# Patient Record
Sex: MALE | Race: WHITE | NOT HISPANIC OR LATINO | Employment: FULL TIME | URBAN - METROPOLITAN AREA
[De-identification: names, ages, dates, MRNs, and addresses within clinical notes are randomized per-mention and may not be internally consistent; named-entity substitution may affect disease eponyms.]

---

## 2017-01-06 ENCOUNTER — ONCOLOGY VISIT (OUTPATIENT)
Dept: ONCOLOGY | Facility: CLINIC | Age: 47
End: 2017-01-06
Attending: PHYSICIAN ASSISTANT
Payer: COMMERCIAL

## 2017-01-06 VITALS
OXYGEN SATURATION: 96 % | BODY MASS INDEX: 30.04 KG/M2 | RESPIRATION RATE: 16 BRPM | WEIGHT: 234.1 LBS | SYSTOLIC BLOOD PRESSURE: 136 MMHG | HEIGHT: 74 IN | DIASTOLIC BLOOD PRESSURE: 82 MMHG | HEART RATE: 95 BPM | TEMPERATURE: 98.4 F

## 2017-01-06 DIAGNOSIS — D47.02 MAST CELL DISEASE, SYSTEMIC: Primary | ICD-10-CM

## 2017-01-06 PROCEDURE — 99213 OFFICE O/P EST LOW 20 MIN: CPT | Mod: ZP | Performed by: PHYSICIAN ASSISTANT

## 2017-01-06 PROCEDURE — 99212 OFFICE O/P EST SF 10 MIN: CPT | Mod: ZF

## 2017-01-06 ASSESSMENT — PAIN SCALES - GENERAL: PAINLEVEL: NO PAIN (0)

## 2017-01-06 NOTE — PROGRESS NOTES
"[facility] CANCER CLINIC  FOLLOW-UP VISIT NOTE  Date of visit: 1-5-17          REASON FOR VISIT: MCAS follow up    HPI: Ricky is a 45 year old male who has been diagnosed by Dr Horta with MCAS due to his burning scalp syndrome and presence of mast cells in his scalp biopsy.  Please see his initial consult note on 4/15/15.  The following was cut and past from his last progress note defining the diagnosis:     \"Current Diagnosis: Mast cell activation syndrome (MCAS) would seem to be the diagnosis here as he meets all current diagnostic criteria for this (per Floyd et al., J Hematol Oncol 2011) and the increased mast cells found in his scalp biopsy do not meet criteria for cutaneous mastocytosis. See my 4/15/15 note for the full history of how his illness has developed.\"    Ricky's current therapy includes the following:   -oral Xyzal 5 mg QID  -oral Allegra 45 mg QID (breaks 1 tab into 4)  -2 tabs of oral ketotifen 4-5 x daily  -plaquenil once daily  -oral cromolyn QID (usually 200 mg in AM and then 100 mg subsequently)  -monthly steroid injections into his scalp  -ketotifen eye drops 2-3 times a day  -cayenne pepper caps  -theron ground in food    INTERVAL HISTORY: Ricky's last changes to his regimen have been the oral cayenne tabs and ground theron. With this addition and adjustments in the H1 blockers he reports his pain in his scalp is a 2-3/10.  He tells me that when he was initially diagnosed, he was having severe 7-8/10 scalp pain, so he is very grateful for this substantial improvement.  He even has a some notable hair re-growth that he shows me today.  He seems to be tolerating all his meds with no adverse effects.  He runs daily at the gym and works full time. He feels his energy and focus is better.    No other cutaneous symptoms (flushing/hives/itching), no GI or neuro symptoms.       EXAM:  /82 mmHg  Pulse 95  Temp(Src) 98.4  F (36.9  C) (Oral)  Resp 16  Ht 1.87 m " "(6' 1.62\")  Wt 106.187 kg (234 lb 1.6 oz)  BMI 30.37 kg/m2  SpO2 96%  Wt Readings from Last 4 Encounters:   01/06/17 106.187 kg (234 lb 1.6 oz)   08/24/16 104.327 kg (230 lb)   06/06/16 101.243 kg (223 lb 3.2 oz)   04/25/16 100.517 kg (221 lb 9.6 oz)     46 year old male appears younger than his stated age.  Has a full head of hair, with no discernable redness/rash.   Very talkative. No other hives/erythema/flushing on skin.    ASSESSMENT/PLAN: 46 year old man with history of burning scalp syndrome which biopsy showed increased amount of mast cells.  He has no other systemic symptoms or body systems that seem to have components of MCAS except his scalp.  Overall he has had a huge improvement in his symptoms from 8/10 -->2/10 but he really wants to get it to zero.    Ricky currently has had tremendous relief with spacing out his H1 blockers.  He thinks the addition of oral cromolyn and ketotifen has helped tremendously.  He has felt like high dose Vit C made it worse and that singulair didn't help (he has tried ALA, NAC, quercetin, vit c, vit d). The celebrex didn't help. He no longer takes Tylenol either, although I am not sure I got an answer why. He didn't care for any of the topical options I gave him at our last visit and the Zileuton didn't help either.  I feel he has pretty good control and he seems very happy right now. I would like to avoid further scripts with him and encouraged continuing with more natural oral anti inflammatory supplements (curcumin, tumeric, and a re-trial of ALA) since the theron and cayenne are working so well.           Jacquie Almanza PA-C    "

## 2017-01-06 NOTE — NURSING NOTE
"Ricky Epstein is a 46 year old male who presents for:  Chief Complaint   Patient presents with     Oncology Clinic Visit     Dermatitis, Dysethesia        Initial Vitals:  /82 mmHg  Pulse 95  Temp(Src) 98.4  F (36.9  C) (Oral)  Resp 16  Ht 1.87 m (6' 1.62\")  Wt 106.187 kg (234 lb 1.6 oz)  BMI 30.37 kg/m2  SpO2 96% Estimated body mass index is 30.37 kg/(m^2) as calculated from the following:    Height as of this encounter: 1.87 m (6' 1.62\").    Weight as of this encounter: 106.187 kg (234 lb 1.6 oz).. Body surface area is 2.35 meters squared. BP completed using cuff size: regular  No Pain (0) No LMP for male patient. Allergies and medications reviewed.     Medications: Medication refills not needed today.  Pharmacy name entered into EPIC:    [facility]  [facility] [address] [place] [address] [id number] [facility]    Comments:     7 minutes for nursing intake (face to face time)   Miriam Garcia LPN          "

## 2017-01-06 NOTE — MR AVS SNAPSHOT
After Visit Summary   1/6/2017    Ricky Epstein    MRN: [id number]           Patient Information     Date Of Birth          1970        Visit Information        Provider Department      1/6/2017 1:20 PM Liz Almanza PA-C [facility]         Follow-ups after your visit        Your next 10 appointments already scheduled     Apr 14, 2017 11:15 AM   (Arrive by 11:00 AM)   RETURN HAIRLOSS with Alicia Gan MD   [facility])    [address] [place] [address]  3rd [facility] [id number]   [phone]            Apr 14, 2017  1:30 PM   (Arrive by 1:15 PM)   Return Visit with Juma Horta MD   [facility] ([facility])    [address] [place] [id number] [phone]            Jul 24, 2017  4:50 PM   (Arrive by 4:35 PM)   Return Visit with Liz Almanza PA-C   [facility] ([facility])    [address] [place] [id number] [phone]              Who to contact     If you have questions or need follow up information about today's clinic visit or your schedule please contact [facility] directly at [phone].  Normal or non-critical lab and imaging results will be communicated to you by MyChart, letter or phone within 4 business days after the clinic has received the results. If you do not hear from us within 7 days, please contact the clinic through MyChart or phone. If you have a critical or abnormal lab result, we will notify you by phone as soon as possible.  Submit refill requests through [employer] or call your pharmacy and they will forward the refill request to us. Please allow 3 business days for your refill to be completed.          Additional Information About Your Visit        DJZhart Information     [employer] gives you secure access to your  "electronic health record. If you see a primary care provider, you can also send messages to your care team and make appointments. If you have questions, please call your primary care clinic.  If you do not have a primary care provider, please call [phone] and they will assist you.        Care EveryWhere ID     This is your Care EveryWhere ID. This could be used by other organizations to access your [place] medical records  [id number]        Your Vitals Were     Pulse Temperature Respirations Height BMI (Body Mass Index) Pulse Oximetry    95 98.4  F (36.9  C) (Oral) 16 1.87 m (6' 1.62\") 30.37 kg/m2 96%       Blood Pressure from Last 3 Encounters:   01/06/17 136/82   11/07/16 128/88   08/24/16 133/85    Weight from Last 3 Encounters:   01/06/17 106.187 kg (234 lb 1.6 oz)   08/24/16 104.327 kg (230 lb)   06/06/16 101.243 kg (223 lb 3.2 oz)              Today, you had the following     No orders found for display         Today's Medication Changes          These changes are accurate as of: 1/6/17  2:06 PM.  If you have any questions, ask your nurse or doctor.               These medicines have changed or have updated prescriptions.        Dose/Directions    * UNABLE TO FIND   This may have changed:  Another medication with the same name was removed. Continue taking this medication, and follow the directions you see here.   Changed by:  Juma Horta MD        Dose:  1 teaspoonful   1 teaspoonful daily MEDICATION NAME: Diatomaceous Earth   Refills:  0       * UNABLE TO FIND   This may have changed:  Another medication with the same name was removed. Continue taking this medication, and follow the directions you see here.   Changed by:  Liz Almanza PA-C        Dose:  1 capsule   1 capsule 8 times daily Cyan Pepper   Refills:  0       * UNABLE TO FIND   This may have changed:  Another medication with the same name was removed. Continue taking this medication, and follow the directions you see here. "   Changed by:  Liz Almanza PA-C        Dose:  1 teaspoonful   1 teaspoonful 6 times daily Organic Ground Shawanda   Refills:  0       * Notice:  This list has 3 medication(s) that are the same as other medications prescribed for you. Read the directions carefully, and ask your doctor or other care provider to review them with you.      Stop taking these medicines if you haven't already. Please contact your care team if you have questions.     ALOE CAPE Misc   Stopped by:  Liz Almanza PA-C           clobetasol propionate 0.05 % Sham   Stopped by:  Liz Almanza PA-C           cromolyn 100 MG/5ML (HIGH CONC) solution   Commonly known as:  GASTROCROM   Stopped by:  Liz Almanza PA-C           cromolyn sodium 5.2 MG/ACT Aers Inhaler   Stopped by:  Liz Almanza PA-C           econazole nitrate 1 % cream   Stopped by:  Liz Almanza PA-C           Efinaconazole 10 % Soln   Stopped by:  Liz Almanza PA-C           ketoconazole 2 % shampoo   Commonly known as:  NIZORAL   Stopped by:  Liz Almanza PA-C           NAPROXEN SODIUM PO   Stopped by:  Liz Almanza PA-C           nystatin [id number] UNIT/ML suspension   Commonly known as:  MYCOSTATIN   Stopped by:  Liz Almanza PA-C           promethazine 25 MG tablet   Commonly known as:  PHENERGAN   Stopped by:  Liz Almanza PA-C           QUERCETIN PO   Stopped by:  Liz Almanza PA-C           SODIUM BICARBONATE PO   Stopped by:  Liz Almanza PA-C           terbinafine 250 MG tablet   Commonly known as:  lamISIL   Stopped by:  Liz Almanza PA-C           ZANTAC PO   Stopped by:  Liz Almanza PA-C           zileuton 600 MG Tabs tablet   Commonly known as:  ZYFLO   Stopped by:  Liz Almanza PA-C                    Primary Care Provider Office Phone # Fax #    Chad Penaloza [phone]       [address] [address]         Thank you!     Thank you for choosing [facility] CANCER CLINIC  for your care. Our goal is always to provide you with excellent care. Hearing back from our patients is one way we can continue to improve our services. Please take a few minutes to complete the written survey that you may receive in the mail after your visit with us. Thank you!             Your Updated Medication List - Protect others around you: Learn how to safely use, store and throw away your medicines at www.disposemymeds.org.          This list is accurate as of: 1/6/17  2:06 PM.  Always use your most recent med list.                   Brand Name Dispense Instructions for use    cromolyn 4 % ophthalmic solution    OPTICROM    3 Bottle    Place 1 drop into both eyes 8 times daily       DHS ZINC 2 % Sham   Generic drug:  Pyrithione Zinc      Externally apply topically daily       fexofenadine 60 MG tablet    ALLEGRA    120 tablet    Take 3 tabs in am (180 mg) and 60 mg in mid afternoon       finasteride 5 MG tablet    PROSCAR    30 tablet    Take 1 tablet (5 mg) by mouth daily       hydroxychloroquine 200 MG tablet    PLAQUENIL    30 tablet    Take 1 tablet (200 mg) by mouth daily       iron 325 (65 FE) MG tablet      Take 1 tablet by mouth daily       levocetirizine 5 MG tablet    XYZAL    120 tablet    Take 1 tablet (5 mg) by mouth 2 times daily May increase to 10 mg bid if feeling that 5 mg bid is helping better than other H1 blockers.       NEW MED     240 capsule    Compounded oral ketotifen 1 mg caps, start 1 mg bid, escalate weekly as tolerated to maximum 4 mg bid-tid       * UNABLE TO FIND      1 teaspoonful daily MEDICATION NAME: Diatomaceous Earth       * UNABLE TO FIND      1 capsule 8 times daily Cyan Pepper       * UNABLE TO FIND      1 teaspoonful 6 times daily Organic Ground Shawanda       vitamin  B-12 250 MCG Tabs      Take by mouth daily       * Notice:  This list has 3 medication(s) that are the same as other medications  prescribed for you. Read the directions carefully, and ask your doctor or other care provider to review them with you.

## 2017-01-06 NOTE — Clinical Note
1/6/2017       RE: Ricky Epstein  [address] [place] [address]     Dear Colleague,    Thank you for referring your patient, Ricky Epstein, to the [facility]. Please see a copy of my visit note below.    No notes on file    Again, thank you for allowing me to participate in the care of your patient.      Sincerely,    Liz Almanza PA-C

## 2017-01-06 NOTE — Clinical Note
"1/6/2017      RE: Ricky Epstein  [address] [place] [address]       [facility] CANCER CLINIC  FOLLOW-UP VISIT NOTE  Date of visit: 1-5-17          REASON FOR VISIT: MCAS follow up    HPI: Ricky is a 45 year old male who has been diagnosed by Dr Horta with MCAS due to his burning scalp syndrome and presence of mast cells in his scalp biopsy.  Please see his initial consult note on 4/15/15.  The following was cut and past from his last progress note defining the diagnosis:     \"Current Diagnosis: Mast cell activation syndrome (MCAS) would seem to be the diagnosis here as he meets all current diagnostic criteria for this (per Floyd et al., J Hematol Oncol 2011) and the increased mast cells found in his scalp biopsy do not meet criteria for cutaneous mastocytosis. See my 4/15/15 note for the full history of how his illness has developed.\"    Ricky's current therapy includes the following:   -oral Xyzal 5 mg QID  -oral Allegra 45 mg QID (breaks 1 tab into 4)  -2 tabs of oral ketotifen 4-5 x daily  -plaquenil once daily  -oral cromolyn QID (usually 200 mg in AM and then 100 mg subsequently)  -monthly steroid injections into his scalp  -ketotifen eye drops 2-3 times a day  -cayenne pepper caps  -theron ground in food    INTERVAL HISTORY: Ricky's last changes to his regimen have been the oral cayenne tabs and ground theron. With this addition and adjustments in the H1 blockers he reports his pain in his scalp is a 2-3/10.  He tells me that when he was initially diagnosed, he was having severe 7-8/10 scalp pain, so he is very grateful for this substantial improvement.  He even has a some notable hair re-growth that he shows me today.  He seems to be tolerating all his meds with no adverse effects.  He runs daily at the gym and works full time. He feels his energy and focus is better.    No other cutaneous symptoms (flushing/hives/itching), no GI or neuro symptoms.       EXAM:  /82 " "mmHg  Pulse 95  Temp(Src) 98.4  F (36.9  C) (Oral)  Resp 16  Ht 1.87 m (6' 1.62\")  Wt 106.187 kg (234 lb 1.6 oz)  BMI 30.37 kg/m2  SpO2 96%  Wt Readings from Last 4 Encounters:   01/06/17 106.187 kg (234 lb 1.6 oz)   08/24/16 104.327 kg (230 lb)   06/06/16 101.243 kg (223 lb 3.2 oz)   04/25/16 100.517 kg (221 lb 9.6 oz)     46 year old male appears younger than his stated age.  Has a full head of hair, with no discernable redness/rash.   Very talkative. No other hives/erythema/flushing on skin.    ASSESSMENT/PLAN: 46 year old man with history of burning scalp syndrome which biopsy showed increased amount of mast cells.  He has no other systemic symptoms or body systems that seem to have components of MCAS except his scalp.  Overall he has had a huge improvement in his symptoms from 8/10 -->2/10 but he really wants to get it to zero.    Ricky currently has had tremendous relief with spacing out his H1 blockers.  He thinks the addition of oral cromolyn and ketotifen has helped tremendously.  He has felt like high dose Vit C made it worse and that singulair didn't help (he has tried ALA, NAC, quercetin, vit c, vit d). The celebrex didn't help. He no longer takes Tylenol either, although I am not sure I got an answer why. He didn't care for any of the topical options I gave him at our last visit and the Zileuton didn't help either.  I feel he has pretty good control and he seems very happy right now. I would like to avoid further scripts with him and encouraged continuing with more natural oral anti inflammatory supplements (curcumin, tumeric, and a re-trial of ALA) since the theron and cayenne are working so well.           Jacquie Almanza PA-C          "

## 2017-02-13 DIAGNOSIS — D47.02 MAST CELL DISEASE, SYSTEMIC: Chronic | ICD-10-CM

## 2017-02-13 RX ORDER — CROMOLYN SODIUM 40 MG/ML
1 SOLUTION/ DROPS OPHTHALMIC
Qty: 3 BOTTLE | Refills: 11 | Status: SHIPPED | OUTPATIENT
Start: 2017-02-13 | End: 2018-10-15

## 2017-03-30 DIAGNOSIS — D47.02 MAST CELL DISEASE, SYSTEMIC: ICD-10-CM

## 2017-03-30 NOTE — TELEPHONE ENCOUNTER
Medication Requested and Quantity:  Compounded KETOTIFEN 1mg  Last date written and prescriber:  3/2016  Last refill per fax:  7/15/16 for #[phone]  Last office visit: 1/6/17    Next office visit:  4/14/17  NOTES: Spoke with patient whom states he takes up to 10-12 mg per day.  He generally takes only 1-2mg at a time.  Currently paying $199 for 420 caps  Processing:  Fax Rx to [facility]  [phone]  APPROVED

## 2017-04-14 ENCOUNTER — ONCOLOGY VISIT (OUTPATIENT)
Dept: ONCOLOGY | Facility: CLINIC | Age: 47
End: 2017-04-14
Attending: INTERNAL MEDICINE
Payer: COMMERCIAL

## 2017-04-14 VITALS
WEIGHT: 242.3 LBS | HEIGHT: 74 IN | OXYGEN SATURATION: 97 % | BODY MASS INDEX: 31.1 KG/M2 | TEMPERATURE: 98.3 F | DIASTOLIC BLOOD PRESSURE: 94 MMHG | RESPIRATION RATE: 18 BRPM | SYSTOLIC BLOOD PRESSURE: 159 MMHG | HEART RATE: 89 BPM

## 2017-04-14 DIAGNOSIS — D89.42 IDIOPATHIC MAST CELL ACTIVATION SYNDROME (H): Primary | Chronic | ICD-10-CM

## 2017-04-14 PROCEDURE — 99214 OFFICE O/P EST MOD 30 MIN: CPT | Mod: ZP | Performed by: INTERNAL MEDICINE

## 2017-04-14 PROCEDURE — 99212 OFFICE O/P EST SF 10 MIN: CPT | Mod: ZF

## 2017-04-14 ASSESSMENT — PAIN SCALES - GENERAL: PAINLEVEL: NO PAIN (0)

## 2017-04-14 NOTE — MR AVS SNAPSHOT
After Visit Summary   4/14/2017    Ricky Epstein    MRN: [id number]           Patient Information     Date Of Birth          1970        Visit Information        Provider Department      4/14/2017 9:00 AM Juma Horta MD [facility]        Today's Diagnoses     Idiopathic mast cell activation syndrome    -  1       Follow-ups after your visit        Follow-up notes from your care team     Return in about 6 months (around 10/14/2017).      Your next 10 appointments already scheduled     May 05, 2017 10:00 AM CDT   (Arrive by 9:45 AM)   RETURN HAIRLOSS with Alicia Gan MD   [facility] ([facility])    [address] [place] [address]  3rd Floor  [facility] [id number] [phone]            Jul 24, 2017  4:50 PM CDT   (Arrive by 4:35 PM)   Return Visit with Liz Almanza PA-C   [facility] ([facility])    [address] [place] [address]  2nd [facility] [id number] [phone]            Nov 07, 2017  4:30 PM CST   (Arrive by 4:15 PM)   Return Visit with Juma Horta MD   [facility] ([facility])    [address] [place] [address]  2nd [facility] [id number] [phone]              Who to contact     If you have questions or need follow up information about today's clinic visit or your schedule please contact [facility] directly at [phone].  Normal or non-critical lab and imaging results will be communicated to you by MyChart, letter or phone within 4 business days after the clinic has received the results. If you do not hear from us within 7 days, please contact the clinic through Intelli[employer] or phone. If you have a critical or abnormal lab result, we will notify you by phone as soon as possible.  Submit refill requests through [employer]oah or call your pharmacy and they will forward the refill request to us. Please  "allow 3 business days for your refill to be completed.          Additional Information About Your Visit        JoyTuneshart Information     [employer] gives you secure access to your electronic health record. If you see a primary care provider, you can also send messages to your care team and make appointments. If you have questions, please call your primary care clinic.  If you do not have a primary care provider, please call [phone] and they will assist you.        Care EveryWhere ID     This is your Care EveryWhere ID. This could be used by other organizations to access your [place] medical records  [id number]        Your Vitals Were     Pulse Temperature Respirations Height Pulse Oximetry BMI (Body Mass Index)    89 98.3  F (36.8  C) (Oral) 18 1.87 m (6' 1.62\") 97% 31.43 kg/m2       Blood Pressure from Last 3 Encounters:   04/14/17 (!) 159/94   01/06/17 136/82   11/07/16 128/88    Weight from Last 3 Encounters:   04/14/17 109.9 kg (242 lb 4.8 oz)   01/06/17 106.2 kg (234 lb 1.6 oz)   08/24/16 104.3 kg (230 lb)              Today, you had the following     No orders found for display         Today's Medication Changes          These changes are accurate as of: 4/14/17 11:53 PM.  If you have any questions, ask your nurse or doctor.               These medicines have changed or have updated prescriptions.        Dose/Directions    * UNABLE TO FIND   This may have changed:  Another medication with the same name was changed. Make sure you understand how and when to take each.   Changed by:  Juma Horta MD        Dose:  1 teaspoonful   1 teaspoonful daily MEDICATION NAME: Diatomaceous Earth   Refills:  0       * UNABLE TO FIND   This may have changed:  Another medication with the same name was changed. Make sure you understand how and when to take each.   Changed by:  Liz Almanza PA-C        Dose:  1 teaspoonful   1 teaspoonful 6 times daily Organic Ground Shawanda   Refills:  0       * UNABLE TO FIND "   This may have changed:    - how to take this  - additional instructions   Changed by:  Juma Horta MD        Dose:  1 capsule   Take 1 capsule by mouth 8 times daily Cayenne Pepper   Refills:  0       * Notice:  This list has 3 medication(s) that are the same as other medications prescribed for you. Read the directions carefully, and ask your doctor or other care provider to review them with you.             Primary Care Provider Office Phone # Fax #    Chad Penaloza [phone]       [facility] [address] [facility] [address]        Thank you!     Thank you for choosing [facility]  for your care. Our goal is always to provide you with excellent care. Hearing back from our patients is one way we can continue to improve our services. Please take a few minutes to complete the written survey that you may receive in the mail after your visit with us. Thank you!             Your Updated Medication List - Protect others around you: Learn how to safely use, store and throw away your medicines at www.disposemymeds.org.          This list is accurate as of: 4/14/17 11:53 PM.  Always use your most recent med list.                   Brand Name Dispense Instructions for use    COMPOUND - PHARMACY TO MIX COMPOUNDED MEDICATION    CMPD RX    420 capsule    KETOTIFEN Compounded 1mg capsules:  Take 1-2mg as needed orally.  MAX dosing 12mg/day       cromolyn 4 % ophthalmic solution    OPTICROM    3 Bottle    Place 1 drop into both eyes 8 times daily       DHS ZINC 2 % Sham   Generic drug:  Pyrithione Zinc      Externally apply topically daily       fexofenadine 60 MG tablet    ALLEGRA    120 tablet    Take 3 tabs in am (180 mg) and 60 mg in mid afternoon       finasteride 5 MG tablet    PROSCAR    30 tablet    Take 1 tablet (5 mg) by mouth daily       hydroxychloroquine 200 MG tablet    PLAQUENIL    30 tablet    Take 1 tablet (200 mg) by mouth daily       iron 325  (65 FE) MG tablet      Take 1 tablet by mouth daily       levocetirizine 5 MG tablet    XYZAL    120 tablet    Take 1 tablet (5 mg) by mouth 2 times daily May increase to 10 mg bid if feeling that 5 mg bid is helping better than other H1 blockers.       * UNABLE TO FIND      1 teaspoonful daily MEDICATION NAME: Diatomaceous Earth       * UNABLE TO FIND      1 teaspoonful 6 times daily Organic Ground Shawanda       * UNABLE TO FIND      Take 1 capsule by mouth 8 times daily Cayenne Pepper       vitamin  B-12 250 MCG Tabs      Take by mouth daily       * Notice:  This list has 3 medication(s) that are the same as other medications prescribed for you. Read the directions carefully, and ask your doctor or other care provider to review them with you.

## 2017-04-14 NOTE — PROGRESS NOTES
"Patient: Ricky Epstein   (MRN [id number])   Encounter Date: Apr 14, 2017  Referring: Alicia Gan M.D. ([facility] Department of Dermatology), Chad Penaloza M.D. ([facility], [address] [place] [address], [phone], fax [phone])  Attending: Juma Horta M.D.     Current Diagnosis: Mast cell activation syndrome (MCAS) would seem to be the diagnosis here as he meets all current diagnostic criteria for this (per Floyd et al., J Hematol Oncol 2011) and the increased mast cells found in his scalp biopsy do not meet criteria for cutaneous mastocytosis.  See my 4/15/15 note for the full history of how his illness has developed.    Current Therapy: He's on a rather complex regimen, taking ophthalmic cromolyn (1 drop to each eye), compounded oral ketotifen 1 mg, and ketotifen eyedrops in rotation such that he's taking each of these four drugs about eight times daily.  Additionally, he takes levocetirizine 10 mg bid, Plaquenil 200 mg qd, fexofenadine 180 mg qam and 60 mg q mid-afternoon, finasteride 5 mg qd, B12 daily, iron sulfate 325 mg qd, cayenne pepper 40,000 unit (?) capsules 1-2 capsules q3h while awake, theron in some quantity several times daily, diatomaceous earth 1 tsp qd, B12 250 mcg po qd, and pyrithione zinc 2% shampoo qd.  He lists penicillin (rash) as his only known drug allergy, but he also reports in 4/17 having learned that ingestion of silica in any form is a trigger, promptly setting his scalp \"on fire.\"    Therapeutic History: We recall nasal cromolyn initially helped his sinonasal symptoms, but then efficacy was lost and he stopped it.  Cromolyn cream, Benadryl cream, Benadryl liquid added to his shampoo, capsaicin cream, and topical aloe vera applied to the scalp did not help the scalp pain and he stopped these trials.  He uses H1 blockers, but he has found no H2 blocker to be helpful.  He found a trial of zileuton intolerable.  Celecoxib was " "unhelpful and was stopped.  He no longer uses H2 blockers of any sort since he has never found any of them helpful.  He has not found oral cromolyn helpful, but ophthalmic cromolyn and ketotifen (and oral ketotifen) have been helpful, including the intriguing observation that ophthalmic cromolyn (applied ophthalmically) has been one of his most useful drugs for controlling his chief presenting complaint of \"screaming\" 7-8/10 scalp pain.    Interval History: This 47 year old single white male  and  returns in scheduled follow-up reporting \"everything's pretty good\" on his oral H1 blockers and compounded ketotifen, ophthalmic cromolyn and ketotifen (intriguingly, he feels ophthalmic cromolyn, applied ophthalmically, is one of his best drugs for controlling his scalp pain), cayenne pepper, theron, hydroxychloroquine, and pyrithione zinc shampoo.  Oral ketotifen is easier for him to tolerate when taken with milk rather than water.  Celecoxib was unhelpful and was stopped.  He remains concerned about progressing weight gain in spite of a good diet and, as always, an aggressive exercise regimen.  The weight gain and his ongoing residual scalp pain, a \"tolerable\" 2-3/10 each day compared to the \"screaming\" 7-8/10 with which he initially presented to me, are really his only complaints.  He remains interested in trying additional interventions in an effort to reduce his residual 2/10 pain to 0/10 (and hopefully to address his weight gain, too).  No other fundamentally new problems.  Complete ROS o/w neg.    Exam finds an unchanged, outwardly completely healthy appearing, middle-aged man (again with what appears to me to be a robust head of hair) in no acute distress, pleasant, cooperative, fully alert and oriented, easily independently ambulatory, presenting by himrself.  He is dressed to attend the  later today of an uncle who recently unexpectedly passed at 71.  Vitals per chart, notable " for BP worsened a bit at 159/94, pulse down from 99 to 89 (obviously still borderline tachycardic in this  at rest), and weight is up significantly yet again (another 12 pounds since 8/16) to another new high of 242 pounds.  Key findings are his stable (but for rising weight), healthy/comfortable general appearance, HEENT benign, still a fairly thick head of hair (I see in 1/17 he observed to AUTUMN Michael that it's actually been growing thicker as his scalp pain has come under better control), still no other plethora/pallor/diaphoresis/jaundice/rash/bleeding/bruising, neck supple, no JVD or thyromegaly or carotid bruits, no palpable adenopathy or tenderness at any of the usual node-bearing sites, clear lungs, regular heart with no adventitious sounds, abdomen benign (except for, again, no longer as scaphoid as I originally saw), a few unchanged (i.e., stable for many years) light brown nevi scattered about the trunk, no peripheral edema, neuro grossly intact.  Previously (at the initial visit here in 4/15), on a light scratch test on the upper back, moderately bright dermatographism (erythroderma only, no hives) immediately emerged and was fully sustained when last checked 10 minutes later, and I saw this again when I re-checked it in 11/15, but we did not re-check it today.    No new labs today.  Last labs in 11/16 showed a 100% normal CBCD improved from a very minimal/borderline anemia in 4/16, possibly due to iron supplementation or possibly due to other interventions better controlling his MCAS), CMP, and iron studies.    A/P: Underlying/unifying diagnosis (MCAS) as detailed above and previously, continues to have a nice response to the previously initiated mast-cell-targeted medications as detailed above.  Although oral cromolyn and a cromolyn cream to the scalp (obviously a messy affair in which one has to wonder how completely the medication could be topically applied to a head full of  "hair) were previously of no help, I am intrigued that ophthalmic cromolyn is one of his best drugs for controlling his \"burning scalp syndrome.\"  As such, I asked him to re-try topical cromolyn again, this time as a cromolyn shampoo, mixing (initially just a partial, later a full) bottle of Nasalcrom or Opticrom to his shampoo, trying to keep the shampoo in place for at least 5-10 minutes, at least once per day (though preferably twice a day).  This obviously will be a relatively low-intensity exposure, and it's not at all clear to me whether it will help, but it certainly ought to be safe and inexpensive.  Although it's likely that his MCAS is driving his weight gain, it's hard to say whether it's the dysfunctional mast cells in his scalp and/or dysfunctional mast cells in some other field which are primarily driving this, and I observed to him that if the scalp mast cells are the primary drivers of both the scalp pain and the weight gain, it likely will take considerably longer to see effects of the topical cromolyn on weight gain than on scalp pain, and it's even possible we might see effects on weight gain without necessarily seeing effects on scalp pain.  As such, this will need to be a relatively long term trial of the cromolyn shampoo, lasting at least 3 months and potentially even 6+ months.    He'll return to see Ms. Almanza in about 3 months and then return to see me in about 6-8 months from now.      Typed, reviewed, and electronically signed by: Juma Horta M.D.     DT: 04/14/2017 11:50 PM    cc: 1. Alicia Gan M.D. ([facility] Department of Dermatology)  2. Chad Penaloza M.D. ([facility], [facility], [place] [address], [phone], fax [phone])  "

## 2017-04-14 NOTE — LETTER
"4/14/2017       RE: Ricky Epstein  [address] [place] [address] [facility] [address]     Dear Colleague,    Thank you for referring your patient, Ricky Epstein, to the [facility]. Please see a copy of my visit note below.    Patient: Ricky Epstein   (MRN [id number])   Encounter Date: Apr 14, 2017  Referring: Alicia Gan M.D. ([facility] Department of Dermatology), Chad Penaloza M.D. ([address] [address], [phone], fax [phone])  Attending: Juma Horta M.D.     Current Diagnosis: Mast cell activation syndrome (MCAS) would seem to be the diagnosis here as he meets all current diagnostic criteria for this (per Floyd et al., J Hematol Oncol 2011) and the increased mast cells found in his scalp biopsy do not meet criteria for cutaneous mastocytosis.  See my 4/15/15 note for the full history of how his illness has developed.    Current Therapy: He's on a rather complex regimen, taking ophthalmic cromolyn (1 drop to each eye), compounded oral ketotifen 1 mg, and ketotifen eyedrops in rotation such that he's taking each of these four drugs about eight times daily.  Additionally, he takes levocetirizine 10 mg bid, Plaquenil 200 mg qd, fexofenadine 180 mg qam and 60 mg q mid-afternoon, finasteride 5 mg qd, B12 daily, iron sulfate 325 mg qd, cayenne pepper 40,000 unit (?) capsules 1-2 capsules q3h while awake, theron in some quantity several times daily, diatomaceous earth 1 tsp qd, B12 250 mcg po qd, and pyrithione zinc 2% shampoo qd.  He lists penicillin (rash) as his only known drug allergy, but he also reports in 4/17 having learned that ingestion of silica in any form is a trigger, promptly setting his scalp \"on fire.\"    Therapeutic History: We recall nasal cromolyn initially helped his sinonasal symptoms, but then efficacy was lost and he stopped it.  Cromolyn cream, Benadryl cream, Benadryl liquid added to his shampoo, capsaicin " "cream, and topical aloe vera applied to the scalp did not help the scalp pain and he stopped these trials.  He uses H1 blockers, but he has found no H2 blocker to be helpful.  He found a trial of zileuton intolerable.  Celecoxib was unhelpful and was stopped.  He no longer uses H2 blockers of any sort since he has never found any of them helpful.  He has not found oral cromolyn helpful, but ophthalmic cromolyn and ketotifen (and oral ketotifen) have been helpful, including the intriguing observation that ophthalmic cromolyn (applied ophthalmically) has been one of his most useful drugs for controlling his chief presenting complaint of \"screaming\" 7-8/10 scalp pain.    Interval History: This 47 year old single white male  and  returns in scheduled follow-up reporting \"everything's pretty good\" on his oral H1 blockers and compounded ketotifen, ophthalmic cromolyn and ketotifen (intriguingly, he feels ophthalmic cromolyn, applied ophthalmically, is one of his best drugs for controlling his scalp pain), cayenne pepper, theron, hydroxychloroquine, and pyrithione zinc shampoo.  Oral ketotifen is easier for him to tolerate when taken with milk rather than water.  Celecoxib was unhelpful and was stopped.  He remains concerned about progressing weight gain in spite of a good diet and, as always, an aggressive exercise regimen.  The weight gain and his ongoing residual scalp pain, a \"tolerable\" 2-3/10 each day compared to the \"screaming\" 7-8/10 with which he initially presented to me, are really his only complaints.  He remains interested in trying additional interventions in an effort to reduce his residual 2/10 pain to 0/10 (and hopefully to address his weight gain, too).  No other fundamentally new problems.  Complete ROS o/w neg.    Exam finds an unchanged, outwardly completely healthy appearing, middle-aged man (again with what appears to me to be a robust head of hair) in no acute distress, " pleasant, cooperative, fully alert and oriented, easily independently ambulatory, presenting by himrself.  He is dressed to attend the  later today of an uncle who recently unexpectedly passed at 71.  Vitals per chart, notable for BP worsened a bit at 159/94, pulse down from 99 to 89 (obviously still borderline tachycardic in this  at rest), and weight is up significantly yet again (another 12 pounds since ) to another new high of 242 pounds.  Key findings are his stable (but for rising weight), healthy/comfortable general appearance, HEENT benign, still a fairly thick head of hair (I see in  he observed to AUTUMN Michael that it's actually been growing thicker as his scalp pain has come under better control), still no other plethora/pallor/diaphoresis/jaundice/rash/bleeding/bruising, neck supple, no JVD or thyromegaly or carotid bruits, no palpable adenopathy or tenderness at any of the usual node-bearing sites, clear lungs, regular heart with no adventitious sounds, abdomen benign (except for, again, no longer as scaphoid as I originally saw), a few unchanged (i.e., stable for many years) light brown nevi scattered about the trunk, no peripheral edema, neuro grossly intact.  Previously (at the initial visit here in 4/15), on a light scratch test on the upper back, moderately bright dermatographism (erythroderma only, no hives) immediately emerged and was fully sustained when last checked 10 minutes later, and I saw this again when I re-checked it in 11/15, but we did not re-check it today.    No new labs today.  Last labs in  showed a 100% normal CBCD improved from a very minimal/borderline anemia in , possibly due to iron supplementation or possibly due to other interventions better controlling his MCAS), CMP, and iron studies.    A/P: Underlying/unifying diagnosis (MCAS) as detailed above and previously, continues to have a nice response to the previously initiated  "mast-cell-targeted medications as detailed above.  Although oral cromolyn and a cromolyn cream to the scalp (obviously a messy affair in which one has to wonder how completely the medication could be topically applied to a head full of hair) were previously of no help, I am intrigued that ophthalmic cromolyn is one of his best drugs for controlling his \"burning scalp syndrome.\"  As such, I asked him to re-try topical cromolyn again, this time as a cromolyn shampoo, mixing (initially just a partial, later a full) bottle of Nasalcrom or Opticrom to his shampoo, trying to keep the shampoo in place for at least 5-10 minutes, at least once per day (though preferably twice a day).  This obviously will be a relatively low-intensity exposure, and it's not at all clear to me whether it will help, but it certainly ought to be safe and inexpensive.  Although it's likely that his MCAS is driving his weight gain, it's hard to say whether it's the dysfunctional mast cells in his scalp and/or dysfunctional mast cells in some other field which are primarily driving this, and I observed to him that if the scalp mast cells are the primary drivers of both the scalp pain and the weight gain, it likely will take considerably longer to see effects of the topical cromolyn on weight gain than on scalp pain, and it's even possible we might see effects on weight gain without necessarily seeing effects on scalp pain.  As such, this will need to be a relatively long term trial of the cromolyn shampoo, lasting at least 3 months and potentially even 6+ months.    He'll return to see Ms. Almanza in about 3 months and then return to see me in about 6-8 months from now.      Typed, reviewed, and electronically signed by: Jmua Horta M.D.     DT: 04/14/2017 11:50 PM    cc: 1. Alicia Gan M.D. ([facility] Department of Dermatology)  2. Chad Penaloza M.D. ([facility], [address] [place] [address], " [phone], fax [phone])    Again, thank you for allowing me to participate in the care of your patient.      Sincerely,    Juma Horta MD

## 2017-04-14 NOTE — Clinical Note
Patient waiting.  Keep July 2017 appointment with AUTUMN Michael (no labs).  RTC to see me ~6-8 months (no labs).

## 2017-04-14 NOTE — NURSING NOTE
"Ricky Epstein is a 47 year old male who presents for:  Chief Complaint   Patient presents with     Oncology Clinic Visit     Mast cell disease, systemic-Dermatitis        Initial Vitals:  BP (!) 159/94 (BP Location: Left arm, Patient Position: Chair, Cuff Size: Adult Regular)  Pulse 89  Temp 98.3  F (36.8  C) (Oral)  Resp 18  Ht 1.87 m (6' 1.62\")  Wt 109.9 kg (242 lb 4.8 oz)  SpO2 97%  BMI 31.43 kg/m2 Estimated body mass index is 31.43 kg/(m^2) as calculated from the following:    Height as of this encounter: 1.87 m (6' 1.62\").    Weight as of this encounter: 109.9 kg (242 lb 4.8 oz).. Body surface area is 2.39 meters squared. BP completed using cuff size: regular  No Pain (0) No LMP for male patient. Allergies and medications reviewed.     Medications: Medication refills not needed today.  Pharmacy name entered into EPIC:    [facility]  [facility] [address] [place] [address] [address] [place] [address]    Comments:     7 minutes for nursing intake (face to face time)   Evon Simon MA        "

## 2017-04-20 DIAGNOSIS — D47.02 MAST CELL DISEASE, SYSTEMIC: ICD-10-CM

## 2017-04-24 NOTE — TELEPHONE ENCOUNTER
Rx not on current med list, discontinued 1/6/17 by mistake?    Cromolyn  100 mg/5 ml    Last Written Prescription Date: 11/4/2015  Last Fill Quantity: 1200ml,  # refills: 5   Last Office Visit with G, P or [facility] prescribing provider: 1/6/17 with Jacquie ALMEIDA

## 2017-04-25 ENCOUNTER — MYC MEDICAL ADVICE (OUTPATIENT)
Dept: ONCOLOGY | Facility: CLINIC | Age: 47
End: 2017-04-25

## 2017-04-25 RX ORDER — CROMOLYN SODIUM 100 MG/5ML
SOLUTION, CONCENTRATE ORAL
Qty: 1200 ML | Refills: 0 | Status: SHIPPED | OUTPATIENT
Start: 2017-04-25 | End: 2017-05-23

## 2017-05-05 ENCOUNTER — OFFICE VISIT (OUTPATIENT)
Dept: DERMATOLOGY | Facility: CLINIC | Age: 47
End: 2017-05-05

## 2017-05-05 VITALS — DIASTOLIC BLOOD PRESSURE: 89 MMHG | SYSTOLIC BLOOD PRESSURE: 133 MMHG

## 2017-05-05 DIAGNOSIS — Z79.899 ENCOUNTER FOR LONG-TERM (CURRENT) USE OF HIGH-RISK MEDICATION: Primary | ICD-10-CM

## 2017-05-05 DIAGNOSIS — L66.10 LICHEN PLANO-PILARIS: ICD-10-CM

## 2017-05-05 DIAGNOSIS — Z79.899 ENCOUNTER FOR LONG-TERM (CURRENT) USE OF HIGH-RISK MEDICATION: ICD-10-CM

## 2017-05-05 DIAGNOSIS — L66.10 LICHEN PLANOPILARIS: ICD-10-CM

## 2017-05-05 LAB
ALBUMIN SERPL-MCNC: 4.1 G/DL (ref 3.4–5)
ALP SERPL-CCNC: 114 U/L (ref 40–150)
ALT SERPL W P-5'-P-CCNC: 58 U/L (ref 0–70)
ANION GAP SERPL CALCULATED.3IONS-SCNC: 7 MMOL/L (ref 3–14)
AST SERPL W P-5'-P-CCNC: 37 U/L (ref 0–45)
BASOPHILS # BLD AUTO: 0.1 10E9/L (ref 0–0.2)
BASOPHILS NFR BLD AUTO: 1.1 %
BILIRUB SERPL-MCNC: 0.5 MG/DL (ref 0.2–1.3)
BUN SERPL-MCNC: 21 MG/DL (ref 7–30)
CALCIUM SERPL-MCNC: 9.1 MG/DL (ref 8.5–10.1)
CHLORIDE SERPL-SCNC: 106 MMOL/L (ref 94–109)
CO2 SERPL-SCNC: 26 MMOL/L (ref 20–32)
CREAT SERPL-MCNC: 1.14 MG/DL (ref 0.66–1.25)
DIFFERENTIAL METHOD BLD: NORMAL
EOSINOPHIL # BLD AUTO: 0 10E9/L (ref 0–0.7)
EOSINOPHIL NFR BLD AUTO: 0.5 %
ERYTHROCYTE [DISTWIDTH] IN BLOOD BY AUTOMATED COUNT: 12.5 % (ref 10–15)
GFR SERPL CREATININE-BSD FRML MDRD: 68 ML/MIN/1.7M2
GLUCOSE SERPL-MCNC: 94 MG/DL (ref 70–99)
HCT VFR BLD AUTO: 48.3 % (ref 40–53)
HGB BLD-MCNC: 16.2 G/DL (ref 13.3–17.7)
IMM GRANULOCYTES # BLD: 0 10E9/L (ref 0–0.4)
IMM GRANULOCYTES NFR BLD: 0.4 %
LYMPHOCYTES # BLD AUTO: 1.3 10E9/L (ref 0.8–5.3)
LYMPHOCYTES NFR BLD AUTO: 22.6 %
MCH RBC QN AUTO: 30.9 PG (ref 26.5–33)
MCHC RBC AUTO-ENTMCNC: 33.5 G/DL (ref 31.5–36.5)
MCV RBC AUTO: 92 FL (ref 78–100)
MONOCYTES # BLD AUTO: 0.4 10E9/L (ref 0–1.3)
MONOCYTES NFR BLD AUTO: 7.2 %
NEUTROPHILS # BLD AUTO: 3.9 10E9/L (ref 1.6–8.3)
NEUTROPHILS NFR BLD AUTO: 68.2 %
NRBC # BLD AUTO: 0 10*3/UL
NRBC BLD AUTO-RTO: 0 /100
PLATELET # BLD AUTO: 249 10E9/L (ref 150–450)
POTASSIUM SERPL-SCNC: 4.4 MMOL/L (ref 3.4–5.3)
PROT SERPL-MCNC: 7.4 G/DL (ref 6.8–8.8)
RBC # BLD AUTO: 5.25 10E12/L (ref 4.4–5.9)
SODIUM SERPL-SCNC: 138 MMOL/L (ref 133–144)
WBC # BLD AUTO: 5.7 10E9/L (ref 4–11)

## 2017-05-05 RX ORDER — HYDROXYCHLOROQUINE SULFATE 200 MG/1
200 TABLET, FILM COATED ORAL DAILY
Qty: 30 TABLET | Refills: 5 | Status: SHIPPED | OUTPATIENT
Start: 2017-05-05 | End: 2017-11-09

## 2017-05-05 ASSESSMENT — PAIN SCALES - GENERAL: PAINLEVEL: NO PAIN (0)

## 2017-05-05 NOTE — NURSING NOTE
"Dermatology Rooming Note    Ricky Epstein's goals for this visit include:   Chief Complaint   Patient presents with     Derm Problem     Ricky comes to clinic today for hairloss. States \"I think it's better.\"     Lashonda Cox, [facility]    "

## 2017-05-05 NOTE — MR AVS SNAPSHOT
After Visit Summary   5/5/2017    Ricky Epstein    MRN: [id number]           Patient Information     Date Of Birth          1970        Visit Information        Provider Department      5/5/2017 10:00 AM Alicia Gan MD [facility] Dermatology        Today's Diagnoses     Encounter for long-term (current) use of high-risk medication    -  1    Lichen plano-pilaris        Lichen planopilaris           Follow-ups after your visit        Your next 10 appointments already scheduled     Jul 24, 2017  4:50 PM CDT   (Arrive by 4:35 PM)   Return Visit with Liz Almanza PA-C   [facility] ([facility])    [address] [place] [id number] [phone]            Nov 07, 2017  4:30 PM CST   (Arrive by 4:15 PM)   Return Visit with Juma Horta MD   [facility] ([facility])    [address] [place] [address] [phone]              Who to contact     Please call your clinic at [phone] to:    Ask questions about your health    Make or cancel appointments    Discuss your medicines    Learn about your test results    Speak to your doctor   If you have compliments or concerns about an experience at your clinic, or if you wish to file a complaint, please contact [facility] Patient Relations at [phone] or email us at [email or web address]         Additional Information About Your Visit        AppCasthart Information     [employer] gives you secure access to your electronic health record. If you see a primary care provider, you can also send messages to your care team and make appointments. If you have questions, please call your primary care clinic.  If you do not have a primary care provider, please call [phone] and they will assist you.      A[employer] is an electronic gateway that provides easy, online  access to your medical records. With iHandle, you can request a clinic appointment, read your test results, renew a prescription or communicate with your care team.     To access your existing account, please contact your [facility] or call [phone] for assistance.        Care EveryWhere ID     This is your Care EveryWhere ID. This could be used by other organizations to access your [facility] medical records  [id number]         Blood Pressure from Last 3 Encounters:   05/05/17 133/89   04/14/17 (!) 159/94   01/06/17 136/82    Weight from Last 3 Encounters:   04/14/17 109.9 kg (242 lb 4.8 oz)   01/06/17 106.2 kg (234 lb 1.6 oz)   08/24/16 104.3 kg (230 lb)                 Where to get your medicines      These medications were sent to [facility] [address] [place] [id number]     Phone:  [phone]     hydroxychloroquine 200 MG tablet          Primary Care Provider Office Phone # Fax #    Chad Penaloza [phone]       [address] [address]        Thank you!     Thank you for choosing [facility] DERMATOLOG[facility]  for your care. Our goal is always to provide you with excellent care. Hearing back from our patients is one way we can continue to improve our services. Please take a few minutes to complete the written survey that you may receive in the mail after your visit with us. Thank you!             Your Updated Medication List - Protect others around you: Learn how to safely use, store and throw away your medicines at www.disposemymeds.org.          This list is accurate as of: 5/5/17 11:59 PM.  Always use your most recent med list.                   Brand Name Dispense Instructions for use    COMPOUND - PHARMACY TO MIX COMPOUNDED MEDICATION    CMPD RX    420 capsule    KETOTIFEN Compounded 1mg capsules:  Take 1-2mg as needed orally.  MAX dosing 12mg/day       cromolyn 4  % ophthalmic solution    OPTICROM    3 Bottle    Place 1 drop into both eyes 8 times daily       DHS ZINC 2 % Sham   Generic drug:  Pyrithione Zinc      Externally apply topically daily       fexofenadine 60 MG tablet    ALLEGRA    120 tablet    Take 3 tabs in am (180 mg) and 60 mg in mid afternoon       finasteride 5 MG tablet    PROSCAR    30 tablet    Take 1 tablet (5 mg) by mouth daily       hydroxychloroquine 200 MG tablet    PLAQUENIL    30 tablet    Take 1 tablet (200 mg) by mouth daily       iron 325 (65 FE) MG tablet      Take 1 tablet by mouth daily       levocetirizine 5 MG tablet    XYZAL    120 tablet    Take 1 tablet (5 mg) by mouth 2 times daily May increase to 10 mg bid if feeling that 5 mg bid is helping better than other H1 blockers.       * UNABLE TO FIND      1 teaspoonful daily MEDICATION NAME: Diatomaceous Earth       * UNABLE TO FIND      1 teaspoonful 6 times daily Organic Ground Shawanda       * UNABLE TO FIND      Take 1 capsule by mouth 8 times daily Cayenne Pepper       vitamin  B-12 250 MCG Tabs      Take by mouth daily       * Notice:  This list has 3 medication(s) that are the same as other medications prescribed for you. Read the directions carefully, and ask your doctor or other care provider to review them with you.

## 2017-05-05 NOTE — LETTER
"5/5/2017       RE: Ricky Epstein  [address] [place] [address] [address] [place] [address]     Dear Colleague,    Thank you for referring your patient, Ricky Epstein, to the [facility] DERMATOLOGY at [facility]. Please see a copy of my visit note below.    [facility] Dermatology Note      Dermatology Problem List:  1. 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  - Currently treated by ILK 10 injections PRN , hydroxychloroquine 200mg daily, finasteride 5mg daily, alternating ketoconazole 2% shampoo with Head and Shoulders with Zinc shampoo daily for his seborrheic dermatitis  - Last saw ophthalmology in Aug 2015, now on yearly examinations.  - Plaquenil monitoring labs due June/July 2016..     2. Mast cell activation syndrome, follows with Dr. Horta. Last seen 4/15/16.   - Negative for c-kit mutation by genetics.  - Currently on cromolyn (oral and ophthalmic), ketotifen (oral and ophthalmic), fexofenadine, levocetirizine, vitamin B12, and iron sulfate.    Encounter Date: May 5, 2017    CC:   Chief Complaint   Patient presents with     Derm Problem     Ricky comes to clinic today for hairloss. States \"I think it's better.\"         History of Present Illness:  This 47 year old male here for a recheck of his scalp. He was last seen Nov 2016 and asked to continue ketoconazole shampoo and hydroxychloroquine 200 mg daily.    He returns reporting stable hair density, less scalp redness, and ongoing scalp burning, from which he experiences 3-4 hours of near-complete relief with oral capsaicin/theron/milk. For the past three weeks, he has been using topical cromolyn ophthalmic solution, which he feels is contributing to his recent intended weight loss (-9lbs).  He regimen also includes finasteride 5mg daily, alternating ketoconazole 2% shampoo with Head and Shoulders with Zinc shampoo daily.   The patient is otherwise feeling well and has no additional concerns at this " time with regards to his skin.    Past Medical History:   - Dermatitis  - Onycholysis of toenail  - Dysesthesia  - Lichen plano pilaris  - Pruritus of scalp  - Mast cell disease, systemic (H)     Medications:  Current Outpatient Prescriptions   Medication Sig Dispense Refill     cromolyn (GASTROCROM) 100 MG/5ML (HIGH CONC) solution TAKE 10ML (200MG) BY MOUTH 4 TIMES DAILY, BEFORE MEALS AND NIGHTLY (Patient taking differently: topical) 1200 mL 0     UNABLE TO FIND Take 1 capsule by mouth 8 times daily Cayenne Pepper       COMPOUND (CMPD RX) - PHARMACY TO MIX COMPOUNDED MEDICATION KETOTIFEN Compounded 1mg capsules:  Take 1-2mg as needed orally.  MAX dosing 12mg/day 420 capsule 11     cromolyn (OPTICROM) 4 % ophthalmic solution Place 1 drop into both eyes 8 times daily 3 Bottle 11     UNABLE TO FIND 1 teaspoonful 6 times daily Organic Ground Ginger       hydroxychloroquine (PLAQUENIL) 200 MG tablet Take 1 tablet (200 mg) by mouth daily 30 tablet 5     levocetirizine (XYZAL) 5 MG tablet Take 1 tablet (5 mg) by mouth 2 times daily May increase to 10 mg bid if feeling that 5 mg bid is helping better than other H1 blockers. 120 tablet 11     finasteride (PROSCAR) 5 MG tablet Take 1 tablet (5 mg) by mouth daily 30 tablet 3     UNABLE TO FIND 1 teaspoonful daily MEDICATION NAME: Diatomaceous Earth       fexofenadine (ALLEGRA) 60 MG tablet Take 3 tabs in am (180 mg) and 60 mg in mid afternoon 120 tablet 5     Pyrithione Zinc (DHS ZINC) 2 % SHAM Externally apply topically daily        Cyanocobalamin (VITAMIN  B-12) 250 MCG TABS Take by mouth daily       Ferrous Sulfate (IRON) 325 (65 FE) MG tablet Take 1 tablet by mouth daily          Allergies   Allergen Reactions     Penicillins Rash     Other reaction(s): Edema         Review of Systems:  -Constitutional: The patient denies fatigue, fevers, chills, unintended weight loss, and night sweats.    Physical exam:  GEN: This is a well developed, well-nourished male in no acute  distress, in a pleasant mood.     SKIN: Focused examination of the scalp, face, hands and oral mucosa was performed.  -Generalized light pink macular erythema of the fronto/frontotemporal scalp. The mid parietal and occipital scalp are within normal lmits.  -Mild follicular accentuation, perifollicular scale on the parietal scalp  -Nails normal  -No other lesions of concern on areas examined.  -LPPAI score of 1.0 - (1.0 for erythema, scale, and burning)  -No other lesions of concern on areas examined.     Impression/Plan:  1. Lichenplanopilaris with component of androgenetic alopecia in setting of mast cell activation syndrome. Clinically patient's scalp disease is improved and stable, with milder light pink erythema overall. We will:    Draw CBC, CMP    Continue hydroxychloroquine 200 mg daily. Last ophtho exam 8/2015 (discussed repeating, but per patient optho was OK with 2-3 year screening).    Continue finasteride 5 mg daily    continue ketoconazole 2% shampoo and try alternating with the OTC ketoconazole 1% shampoo.       2.     Mast cell activation syndrome, follows with Dr. Horta. Last seen 4/14/17, seeing some benefit after 3 weeks of topical cromolyn.      Negative for c-kit mutation by genetics      Currently on cromolyn (PO and ophthalmic), ketotifen (PO and ophthalmic), fexofenadine, levocetirizine, Vit B12, and iron sulfate        CC Dr. Horta on close of this encounter.    Follow-up in 4 months for recheck.       Discussed and examined with [facility] dermatology chair Dr. Alicia Gan.    Aung Shaw MD, DENI  PGY-4, Dermatology    Patient was seen and examined with the dermatology resident. I agree with the history, review of systems, physical examination, assessments and plan.  Alicia Gan MD  Professor and  Chair  Department of Dermatology  [facility]

## 2017-05-05 NOTE — PROGRESS NOTES
"[facility] Dermatology Note      Dermatology Problem List:  1. 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  - Currently treated by ILK 10 injections PRN , hydroxychloroquine 200mg daily, finasteride 5mg daily, alternating ketoconazole 2% shampoo with Head and Shoulders with Zinc shampoo daily for his seborrheic dermatitis  - Last saw ophthalmology in Aug 2015, now on yearly examinations.  - Plaquenil monitoring labs due June/July 2016..     2. Mast cell activation syndrome, follows with Dr. Horta. Last seen 4/15/16.   - Negative for c-kit mutation by genetics.  - Currently on cromolyn (oral and ophthalmic), ketotifen (oral and ophthalmic), fexofenadine, levocetirizine, vitamin B12, and iron sulfate.    Encounter Date: May 5, 2017    CC:   Chief Complaint   Patient presents with     Derm Problem     Ricky comes to clinic today for hairloss. States \"I think it's better.\"         History of Present Illness:  This 47 year old male here for a recheck of his scalp. He was last seen Nov 2016 and asked to continue ketoconazole shampoo and hydroxychloroquine 200 mg daily.    He returns reporting stable hair density, less scalp redness, and ongoing scalp burning, from which he experiences 3-4 hours of near-complete relief with oral capsaicin/theron/milk. For the past three weeks, he has been using topical cromolyn ophthalmic solution, which he feels is contributing to his recent intended weight loss (-9lbs).  He regimen also includes finasteride 5mg daily, alternating ketoconazole 2% shampoo with Head and Shoulders with Zinc shampoo daily.   The patient is otherwise feeling well and has no additional concerns at this time with regards to his skin.    Past Medical History:   - Dermatitis  - Onycholysis of toenail  - Dysesthesia  - Lichen plano pilaris  - Pruritus of scalp  - Mast cell disease, systemic (H)     Medications:  Current Outpatient Prescriptions   Medication Sig Dispense " Refill     cromolyn (GASTROCROM) 100 MG/5ML (HIGH CONC) solution TAKE 10ML (200MG) BY MOUTH 4 TIMES DAILY, BEFORE MEALS AND NIGHTLY (Patient taking differently: topical) 1200 mL 0     UNABLE TO FIND Take 1 capsule by mouth 8 times daily Cayenne Pepper       COMPOUND (CMPD RX) - PHARMACY TO MIX COMPOUNDED MEDICATION KETOTIFEN Compounded 1mg capsules:  Take 1-2mg as needed orally.  MAX dosing 12mg/day 420 capsule 11     cromolyn (OPTICROM) 4 % ophthalmic solution Place 1 drop into both eyes 8 times daily 3 Bottle 11     UNABLE TO FIND 1 teaspoonful 6 times daily Organic Ground Ginger       hydroxychloroquine (PLAQUENIL) 200 MG tablet Take 1 tablet (200 mg) by mouth daily 30 tablet 5     levocetirizine (XYZAL) 5 MG tablet Take 1 tablet (5 mg) by mouth 2 times daily May increase to 10 mg bid if feeling that 5 mg bid is helping better than other H1 blockers. 120 tablet 11     finasteride (PROSCAR) 5 MG tablet Take 1 tablet (5 mg) by mouth daily 30 tablet 3     UNABLE TO FIND 1 teaspoonful daily MEDICATION NAME: Diatomaceous Earth       fexofenadine (ALLEGRA) 60 MG tablet Take 3 tabs in am (180 mg) and 60 mg in mid afternoon 120 tablet 5     Pyrithione Zinc (DHS ZINC) 2 % SHAM Externally apply topically daily        Cyanocobalamin (VITAMIN  B-12) 250 MCG TABS Take by mouth daily       Ferrous Sulfate (IRON) 325 (65 FE) MG tablet Take 1 tablet by mouth daily          Allergies   Allergen Reactions     Penicillins Rash     Other reaction(s): Edema         Review of Systems:  -Constitutional: The patient denies fatigue, fevers, chills, unintended weight loss, and night sweats.    Physical exam:  GEN: This is a well developed, well-nourished male in no acute distress, in a pleasant mood.     SKIN: Focused examination of the scalp, face, hands and oral mucosa was performed.  -Generalized light pink macular erythema of the fronto/frontotemporal scalp. The mid parietal and occipital scalp are within normal lmits.  -Mild  follicular accentuation, perifollicular scale on the parietal scalp  -Nails normal  -No other lesions of concern on areas examined.  -LPPAI score of 1.0 - (1.0 for erythema, scale, and burning)  -No other lesions of concern on areas examined.     Impression/Plan:  1. Lichenplanopilaris with component of androgenetic alopecia in setting of mast cell activation syndrome. Clinically patient's scalp disease is improved and stable, with milder light pink erythema overall. We will:    Draw CBC, CMP    Continue hydroxychloroquine 200 mg daily. Last ophtho exam 8/2015 (discussed repeating, but per patient optho was OK with 2-3 year screening).    Continue finasteride 5 mg daily    continue ketoconazole 2% shampoo and try alternating with the OTC ketoconazole 1% shampoo.       2.     Mast cell activation syndrome, follows with Dr. Horta. Last seen 4/14/17, seeing some benefit after 3 weeks of topical cromolyn.      Negative for c-kit mutation by genetics      Currently on cromolyn (PO and ophthalmic), ketotifen (PO and ophthalmic), fexofenadine, levocetirizine, Vit B12, and iron sulfate        CC Dr. Horta on close of this encounter.    Follow-up in 4 months for recheck.       Discussed and examined with [facility] dermatology chair Dr. Alicia Gan.    Aung Shaw MD, DENI  PGY-4, Dermatology    Patient was seen and examined with the dermatology resident. I agree with the history, review of systems, physical examination, assessments and plan.  Alicia Gan MD  Professor and  Chair  Department of Dermatology  [facility]

## 2017-05-23 DIAGNOSIS — D47.02 MAST CELL DISEASE, SYSTEMIC: ICD-10-CM

## 2017-05-23 RX ORDER — CROMOLYN SODIUM 100 MG/5ML
SOLUTION, CONCENTRATE ORAL
Qty: 1200 ML | Refills: 0 | Status: SHIPPED | OUTPATIENT
Start: 2017-05-23 | End: 2017-06-23

## 2017-05-23 NOTE — TELEPHONE ENCOUNTER
Cromolyn 100 mg/5 ml      Last Written Prescription Date: 4/25/17  Last Fill Quantity: 1200 ml,  # refills: 0   Last Office Visit with [facility] or [facility] prescribing provider: 4/14/17 with Dr. Horta  Next office visit: 7/24/17 with Jacquie ALMEIDA

## 2017-06-23 DIAGNOSIS — D47.02 MAST CELL DISEASE, SYSTEMIC: ICD-10-CM

## 2017-06-24 RX ORDER — CROMOLYN SODIUM 100 MG/5ML
SOLUTION, CONCENTRATE ORAL
Qty: 1200 ML | Refills: 5 | Status: SHIPPED | OUTPATIENT
Start: 2017-06-24 | End: 2017-10-17

## 2017-07-24 ENCOUNTER — ONCOLOGY VISIT (OUTPATIENT)
Dept: ONCOLOGY | Facility: CLINIC | Age: 47
End: 2017-07-24
Attending: PHYSICIAN ASSISTANT
Payer: COMMERCIAL

## 2017-07-24 VITALS
TEMPERATURE: 98.5 F | BODY MASS INDEX: 30.71 KG/M2 | SYSTOLIC BLOOD PRESSURE: 127 MMHG | HEART RATE: 84 BPM | DIASTOLIC BLOOD PRESSURE: 85 MMHG | HEIGHT: 74 IN | WEIGHT: 239.3 LBS

## 2017-07-24 DIAGNOSIS — D89.42 IDIOPATHIC MAST CELL ACTIVATION SYNDROME (H): Primary | ICD-10-CM

## 2017-07-24 PROCEDURE — 99213 OFFICE O/P EST LOW 20 MIN: CPT

## 2017-07-24 PROCEDURE — 99212 OFFICE O/P EST SF 10 MIN: CPT | Mod: ZF

## 2017-07-24 PROCEDURE — 99214 OFFICE O/P EST MOD 30 MIN: CPT | Mod: ZP | Performed by: PHYSICIAN ASSISTANT

## 2017-07-24 ASSESSMENT — PAIN SCALES - GENERAL: PAINLEVEL: NO PAIN (0)

## 2017-07-24 NOTE — NURSING NOTE
"Oncology Rooming Note    July 24, 2017 4:59 PM   Ricky Epstein is a 47 year old male who presents for:    Chief Complaint   Patient presents with     Oncology Clinic Visit     Dermatitis, dyesthesia 3 month follow up      Initial Vitals: /85 (BP Location: Left arm, Patient Position: Sitting, Cuff Size: Adult Regular)  Pulse 84  Temp 98.5  F (36.9  C) (Oral)  Ht 1.87 m (6' 1.62\")  Wt 108.5 kg (239 lb 4.8 oz)  BMI 31.04 kg/m2 Estimated body mass index is 31.04 kg/(m^2) as calculated from the following:    Height as of this encounter: 1.87 m (6' 1.62\").    Weight as of this encounter: 108.5 kg (239 lb 4.8 oz). Body surface area is 2.37 meters squared.  No Pain (0) Comment: Data Unavailable   No LMP for male patient.  Allergies reviewed: Yes  Medications reviewed: Yes    Medications: Medication refills not needed today.  Pharmacy name entered into EPIC:    [facility]  [facility] [address] [place] [address]    Clinical concerns: No new concerns at this time.    10 minutes for nursing intake (face to face time)     Katie Freire LPN            "

## 2017-07-24 NOTE — MR AVS SNAPSHOT
After Visit Summary   7/24/2017    Ricky Epstein    MRN: [id number]           Patient Information     Date Of Birth          1970        Visit Information        Provider Department      7/24/2017 4:50 PM Liz Almanza PA-C [facility]        Today's Diagnoses     Idiopathic mast cell activation syndrome (H)    -  1       Follow-ups after your visit        Your next 10 appointments already scheduled     Nov 07, 2017  4:30 PM CST   (Arrive by 4:15 PM)   Return Visit with Juma Horta MD   [facility] ([facility])    [address] [address] [phone]              Who to contact     If you have questions or need follow up information about today's clinic visit or your schedule please contact [facility] directly at [phone].  Normal or non-critical lab and imaging results will be communicated to you by MyChart, letter or phone within 4 business days after the clinic has received the results. If you do not hear from us within 7 days, please contact the clinic through [place] or phone. If you have a critical or abnormal lab result, we will notify you by phone as soon as possible.  Submit refill requests through [employer] or call your pharmacy and they will forward the refill request to us. Please allow 3 business days for your refill to be completed.          Additional Information About Your Visit        MyChart Information     [employer] gives you secure access to your electronic health record. If you see a primary care provider, you can also send messages to your care team and make appointments. If you have questions, please call your primary care clinic.  If you do not have a primary care provider, please call [phone] and they will assist you.        Care EveryWhere ID     This is your Care EveryWhere ID. This could be used by other organizations to access your  "[place] medical records  [id number]        Your Vitals Were     Pulse Temperature Height BMI (Body Mass Index)          84 98.5  F (36.9  C) (Oral) 1.87 m (6' 1.62\") 31.04 kg/m2         Blood Pressure from Last 3 Encounters:   07/24/17 127/85   05/05/17 133/89   04/14/17 (!) 159/94    Weight from Last 3 Encounters:   07/24/17 108.5 kg (239 lb 4.8 oz)   04/14/17 109.9 kg (242 lb 4.8 oz)   01/06/17 106.2 kg (234 lb 1.6 oz)              Today, you had the following     No orders found for display       Primary Care Provider Office Phone # Fax #    Chad Penaloza [phone]       [facility] [address] [facility] [address]        Equal Access to Services     [facility]: Hadii aad ku hadasho Soomaali, waaxda luqadaha, qaybta kaalmada adeegyada, waxay charlenein hayjaye johnston . So [facility] [phone].    ATENCIÓN: Si habla español, tiene a guerra disposición servicios gratuitos de asistencia lingüística. Bethany al [phone].    We comply with applicable federal civil rights laws and [place] laws. We do not discriminate on the basis of race, color, national origin, age, disability sex, sexual orientation or gender identity.            Thank you!     Thank you for choosing [facility]  for your care. Our goal is always to provide you with excellent care. Hearing back from our patients is one way we can continue to improve our services. Please take a few minutes to complete the written survey that you may receive in the mail after your visit with us. Thank you!             Your Updated Medication List - Protect others around you: Learn how to safely use, store and throw away your medicines at www.disposemymeds.org.          This list is accurate as of: 7/24/17 11:59 PM.  Always use your most recent med list.                   Brand Name Dispense Instructions for use Diagnosis    cromolyn 100 MG/5ML (HIGH CONC) solution    GASTROCROM    1200 mL    TAKE " 10ML (200MG) BY MOUTH 4 TIMES DAILY, BEFORE MEALS AND NIGHTLY    Mast cell disease, systemic (H)       cromolyn 4 % ophthalmic solution    OPTICROM    3 Bottle    Place 1 drop into both eyes 8 times daily    Mast cell disease, systemic (H)       DHS ZINC 2 % Sham   Generic drug:  Pyrithione Zinc      Externally apply topically daily        fexofenadine 60 MG tablet    ALLEGRA    120 tablet    Take 3 tabs in am (180 mg) and 60 mg in mid afternoon    H/O lichen planopilaris       finasteride 5 MG tablet    PROSCAR    30 tablet    Take 1 tablet (5 mg) by mouth daily    Lichen planopilaris       hydroxychloroquine 200 MG tablet    PLAQUENIL    30 tablet    Take 1 tablet (200 mg) by mouth daily    Lichen planopilaris       iron 325 (65 FE) MG tablet      Take 1 tablet by mouth daily        * UNABLE TO FIND      1 teaspoonful daily MEDICATION NAME: Diatomaceous Earth        * UNABLE TO FIND      1 teaspoonful 6 times daily Organic Ground [place]        vitamin  B-12 250 MCG Tabs      Take by mouth daily        * Notice:  This list has 2 medication(s) that are the same as other medications prescribed for you. Read the directions carefully, and ask your doctor or other care provider to review them with you.

## 2017-08-04 NOTE — PROGRESS NOTES
"[facility] CANCER CLINIC  FOLLOW-UP VISIT NOTE  Date of visit: 7-24-17          REASON FOR VISIT: MCAS follow up    HPI: Ricky is a 47 year old male who has been diagnosed by Dr Horta with MCAS due to his burning scalp syndrome and presence of mast cells in his scalp biopsy.  Please see his initial consult note on 4/15/15.  The following was cut and past from his last progress note defining the diagnosis:     \"Current Diagnosis: Mast cell activation syndrome (MCAS) would seem to be the diagnosis here as he meets all current diagnostic criteria for this (per Floyd et al., J Hematol Oncol 2011) and the increased mast cells found in his scalp biopsy do not meet criteria for cutaneous mastocytosis. See my 4/15/15 note for the full history of how his illness has developed.\"    Ricky's current therapy includes the following:   -oral Allegra 45 mg QID (breaks 1 tab into 4)  -plaquenil once daily  -oral cromolyn QID (usually 200 mg in AM and then 100 mg subsequently)  -monthly steroid injections into his scalp  -ketotifen eye drops 2-3 times a day  -cayenne pepper caps  -theron ground in food    INTERVAL HISTORY: Since I saw Ricky last, he has cut back on much of his medications- he stopped the Xyzal as he didn't feel it was actually helping.  He also stopped the ketotifen as he felt it was causing abdominal pain.  He continues on Plaquenil and oral Allegra.  He has been using the oral crom in his hair which is making a huge difference. He has been trying a low anti inflammatory diet with theron and tumeric.  He tells me that when he was initially diagnosed, he was having severe 7-8/10 scalp pain, so he is very grateful for this substantial improvement to currently a 2/10.  He even has a some notable hair re-growth that he shows me today and it seems to be straighter.  He seems to be tolerating all his meds with no adverse effects.  He runs daily at the gym and works full time. He feels his " "energy and focus is better.    No other cutaneous symptoms (flushing/hives/itching), no GI or neuro symptoms.       EXAM:  /85 (BP Location: Left arm, Patient Position: Sitting, Cuff Size: Adult Regular)  Pulse 84  Temp 98.5  F (36.9  C) (Oral)  Ht 1.87 m (6' 1.62\")  Wt 108.5 kg (239 lb 4.8 oz)  BMI 31.04 kg/m2  Wt Readings from Last 4 Encounters:   07/24/17 108.5 kg (239 lb 4.8 oz)   04/14/17 109.9 kg (242 lb 4.8 oz)   01/06/17 106.2 kg (234 lb 1.6 oz)   08/24/16 104.3 kg (230 lb)     47 year old male appears younger than his stated age.  Has a full head of hair, with no discernable redness/rash.   Very talkative. No other hives/erythema/flushing on skin.    ASSESSMENT/PLAN: 47 year old man with history of burning scalp syndrome which biopsy showed increased amount of mast cells.  He has no other systemic symptoms or body systems that seem to have components of MCAS except his scalp.  Overall he has had a huge improvement in his symptoms from 8/10 -->2/10 but he really wants to get it to zero.    Ricky currently has had tremendous relief with spacing out his H1 blockers- the allegra.  He has cut back on his medications- which I supported and has been using the oral cromolyn topically which has made a huge difference. Discussed using other H1's topically as well.   I feel he has pretty good control and he seems very happy right now. I would like to avoid further scripts with him and encouraged continuing with more natural oral anti inflammatory supplements (curcumin, tumeric, and a re-trial of ALA) since the theron and cayenne are working so well.   He doesn't have systemic MCAS symptoms- I encouraged him to see dermatology and or one of the local allergists as Dr Ferraro is leaving our clinic.         Jacquie Almanza PA-C    "

## 2017-10-13 ENCOUNTER — TRANSFERRED RECORDS (OUTPATIENT)
Dept: HEALTH INFORMATION MANAGEMENT | Facility: CLINIC | Age: 47
End: 2017-10-13

## 2017-10-16 ENCOUNTER — MYC MEDICAL ADVICE (OUTPATIENT)
Dept: ONCOLOGY | Facility: CLINIC | Age: 47
End: 2017-10-16

## 2017-10-16 DIAGNOSIS — D47.02 MAST CELL DISEASE, SYSTEMIC: ICD-10-CM

## 2017-10-17 ENCOUNTER — TELEPHONE (OUTPATIENT)
Dept: DERMATOLOGY | Facility: CLINIC | Age: 47
End: 2017-10-17

## 2017-10-17 RX ORDER — CROMOLYN SODIUM 100 MG/5ML
SOLUTION, CONCENTRATE ORAL
Qty: 1200 ML | Refills: 11 | Status: SHIPPED | OUTPATIENT
Start: 2017-10-17 | End: 2018-11-15

## 2017-10-18 DIAGNOSIS — L66.10 LICHEN PLANOPILARIS: ICD-10-CM

## 2017-10-18 RX ORDER — HYDROXYCHLOROQUINE SULFATE 200 MG/1
200 TABLET, FILM COATED ORAL DAILY
Qty: 30 TABLET | Refills: 5 | OUTPATIENT
Start: 2017-10-18

## 2017-10-18 NOTE — TELEPHONE ENCOUNTER
Per 5/5 chart note:  1. Lichenplanopilaris with component of androgenetic alopecia in setting of mast cell activation syndrome. Clinically patient's scalp disease is improved and stable, with milder light pink erythema overall. We will:    Draw CBC, CMP    Continue hydroxychloroquine 200 mg daily. Last ophtho exam 8/2015 (discussed repeating, but per patient optho was OK with 2-3 year screening).    Continue finasteride 5 mg daily    continue ketoconazole 2% shampoo and try alternating with the OTC ketoconazole 1% shampoo.       Pt is on the recall list.

## 2017-10-30 DIAGNOSIS — L66.10 LICHEN PLANOPILARIS: ICD-10-CM

## 2017-10-30 LAB
ALBUMIN SERPL-MCNC: 4.3 G/DL (ref 3.4–5)
ALP SERPL-CCNC: 96 U/L (ref 40–150)
ALT SERPL W P-5'-P-CCNC: 44 U/L (ref 0–70)
ANION GAP SERPL CALCULATED.3IONS-SCNC: 8 MMOL/L (ref 3–14)
AST SERPL W P-5'-P-CCNC: 32 U/L (ref 0–45)
BASOPHILS # BLD AUTO: 0 10E9/L (ref 0–0.2)
BASOPHILS NFR BLD AUTO: 0.5 %
BILIRUB SERPL-MCNC: 0.6 MG/DL (ref 0.2–1.3)
BUN SERPL-MCNC: 16 MG/DL (ref 7–30)
CALCIUM SERPL-MCNC: 9.1 MG/DL (ref 8.5–10.1)
CHLORIDE SERPL-SCNC: 104 MMOL/L (ref 94–109)
CO2 SERPL-SCNC: 26 MMOL/L (ref 20–32)
CREAT SERPL-MCNC: 1.36 MG/DL (ref 0.66–1.25)
DIFFERENTIAL METHOD BLD: NORMAL
EOSINOPHIL # BLD AUTO: 0 10E9/L (ref 0–0.7)
EOSINOPHIL NFR BLD AUTO: 0.5 %
ERYTHROCYTE [DISTWIDTH] IN BLOOD BY AUTOMATED COUNT: 13.2 % (ref 10–15)
GFR SERPL CREATININE-BSD FRML MDRD: 56 ML/MIN/1.7M2
GLUCOSE SERPL-MCNC: 95 MG/DL (ref 70–99)
HCT VFR BLD AUTO: 46 % (ref 40–53)
HGB BLD-MCNC: 15.5 G/DL (ref 13.3–17.7)
IMM GRANULOCYTES # BLD: 0 10E9/L (ref 0–0.4)
IMM GRANULOCYTES NFR BLD: 0.2 %
LYMPHOCYTES # BLD AUTO: 1 10E9/L (ref 0.8–5.3)
LYMPHOCYTES NFR BLD AUTO: 16.3 %
MCH RBC QN AUTO: 31.2 PG (ref 26.5–33)
MCHC RBC AUTO-ENTMCNC: 33.7 G/DL (ref 31.5–36.5)
MCV RBC AUTO: 93 FL (ref 78–100)
MONOCYTES # BLD AUTO: 0.5 10E9/L (ref 0–1.3)
MONOCYTES NFR BLD AUTO: 8.1 %
NEUTROPHILS # BLD AUTO: 4.7 10E9/L (ref 1.6–8.3)
NEUTROPHILS NFR BLD AUTO: 74.4 %
NRBC # BLD AUTO: 0 10*3/UL
NRBC BLD AUTO-RTO: 0 /100
PLATELET # BLD AUTO: 215 10E9/L (ref 150–450)
POTASSIUM SERPL-SCNC: 3.8 MMOL/L (ref 3.4–5.3)
PROT SERPL-MCNC: 7.3 G/DL (ref 6.8–8.8)
RBC # BLD AUTO: 4.97 10E12/L (ref 4.4–5.9)
SODIUM SERPL-SCNC: 139 MMOL/L (ref 133–144)
WBC # BLD AUTO: 6.3 10E9/L (ref 4–11)

## 2017-11-07 ENCOUNTER — TRANSFERRED RECORDS (OUTPATIENT)
Dept: HEALTH INFORMATION MANAGEMENT | Facility: CLINIC | Age: 47
End: 2017-11-07

## 2017-11-08 ENCOUNTER — TELEPHONE (OUTPATIENT)
Dept: DERMATOLOGY | Facility: CLINIC | Age: 47
End: 2017-11-08

## 2017-11-09 DIAGNOSIS — L66.10 LICHEN PLANOPILARIS: ICD-10-CM

## 2017-11-09 RX ORDER — HYDROXYCHLOROQUINE SULFATE 200 MG/1
200 TABLET, FILM COATED ORAL DAILY
Qty: 60 TABLET | Refills: 5 | Status: SHIPPED | OUTPATIENT
Start: 2017-11-09 | End: 2018-10-15

## 2017-11-09 NOTE — PROGRESS NOTES
Refill requested for plaquenil. Recent eye exam done November 7th was fine.    Will approve refill request.    Alicia Gan MD

## 2017-12-12 ENCOUNTER — OFFICE VISIT (OUTPATIENT)
Dept: DERMATOLOGY | Facility: CLINIC | Age: 47
End: 2017-12-12

## 2017-12-12 VITALS — DIASTOLIC BLOOD PRESSURE: 91 MMHG | SYSTOLIC BLOOD PRESSURE: 137 MMHG | HEART RATE: 73 BPM

## 2017-12-12 DIAGNOSIS — L66.10 LICHEN PLANO-PILARIS: ICD-10-CM

## 2017-12-12 DIAGNOSIS — L30.9 DERMATITIS: Primary | ICD-10-CM

## 2017-12-12 DIAGNOSIS — L29.9 PRURITUS OF SCALP: ICD-10-CM

## 2017-12-12 ASSESSMENT — PAIN SCALES - GENERAL: PAINLEVEL: NO PAIN (0)

## 2017-12-12 NOTE — PROGRESS NOTES
"[facility] Dermatology Note      Dermatology Problem List:  1. 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  - Past treatments: ILK 10  - Current treatments: hydroxychloroquine 200mg daily (last eye exam 10/2017, safety labs last checked 10/2017, due again 4/2018), finasteride 5mg daily, alternating ketoconazole 2% and 1% shampoos for seborrheic dermatitis    2. Mast cell activation syndrome, previously followed with Dr. Afrin  - Negative for c-kit mutation by genetics.  - Currently on cromolyn (oral and ophthalmic that he puts in shampoo - feels this was very helpful), ketotifen (oral and ophthalmic), fexofenadine, levocetirizine, vitamin B12, and iron sulfate.    Encounter Date: Dec 12, 2017    CC:   Chief Complaint   Patient presents with     Hair Loss     Ricky comes to clinic today for Lichenplanopilaris with component of androgenetic alopecia. States \"I think it's better.\"     History of Present Illness:  This 47 year old male here for a recheck of his scalp. He was last seen May 2017.  He feels that he continues to do better and better.  He mostly attributes this to treatment of his mast cell activation syndrome.  He feels that these treatments were more helpful for his hair loss and scalp symptoms than any other treatment.  He particularly thinks the mixing liquid cromolyn into his shampoo has been helpful for hair regrowth.  He recently started taking biotin and thinks this works as a mast cell deactivator.  Because it helps his scalp symptoms so much, he has been taking 5000mg in the morning then 2500mg every 3-4 hours.  He wonders if this is too much. He reports stable hair density without increased hair shedding.  He is very happy with the results he currently has.    Past Medical History:   - Dermatitis  - Onycholysis of toenail  - Dysesthesia  - Lichen planopilaris  - Pruritus of scalp  - Mast cell disease, systemic    Medications:  Current Outpatient " Prescriptions   Medication Sig Dispense Refill     Biotin 2500 MCG CAPS Take 15,000 mcg by mouth daily       hydroxychloroquine (PLAQUENIL) 200 MG tablet Take 1 tablet (200 mg) by mouth daily 60 tablet 5     cromolyn (GASTROCROM) 100 MG/5ML (HIGH CONC) solution TAKE 10ML (200MG) BY MOUTH 4 TIMES DAILY, BEFORE MEALS AND NIGHTLY 1200 mL 11     cromolyn (OPTICROM) 4 % ophthalmic solution Place 1 drop into both eyes 8 times daily 3 Bottle 11     UNABLE TO FIND 1 teaspoonful 6 times daily Organic Ground Shawanda       finasteride (PROSCAR) 5 MG tablet Take 1 tablet (5 mg) by mouth daily 30 tablet 3     UNABLE TO FIND 1 teaspoonful daily MEDICATION NAME: Diatomaceous Earth       fexofenadine (ALLEGRA) 60 MG tablet Take 3 tabs in am (180 mg) and 60 mg in mid afternoon 120 tablet 5     Pyrithione Zinc (DHS ZINC) 2 % SHAM Externally apply topically daily        Cyanocobalamin (VITAMIN  B-12) 250 MCG TABS Take by mouth daily       Ferrous Sulfate (IRON) 325 (65 FE) MG tablet Take 1 tablet by mouth daily          Allergies   Allergen Reactions     Penicillins Rash     Other reaction(s): Edema     Review of Systems:  -Constitutional:  Feeling well, in usual state of health. Notes intentional 40lb weight loss this year.  -Skin:  As per HPI, no additional concerns.    Physical exam:  GEN: This is a well developed, well-nourished male in no acute distress, in a pleasant mood.     SKIN: Focused examination of the scalp, face, hands and oral mucosa was performed.  -Generalized light pink macular erythema of the fronto/frontotemporal scalp, more on the left side than the right side today.  -Mild follicular accentuation and perifollicular scale on the left parietal scalp in particular.  -Layers of regrowth: 1cm for the first layer, 6-7cm for the second layer, then out to the cut layer.  -LPPAI score of 1.67.  -Nails normal with dorsal pterygium formation.  -No other lesions of concern on areas examined.     Impression/Plan:  1.  Lichenplanopilaris with component of androgenetic alopecia in setting of mast cell activation syndrome.  LPPAI score is 1.67 today due to current symptoms (last was 1.0), but clinically patient's scalp disease is improved.  His hair density is within normal limits and he has much milder erythema overall.  We will:    Continue hydroxychloroquine 200 mg daily. He will need safety labs in another four months (around 4/2018) and ophthalmology examination around 10/2018.    Continue finasteride 5 mg daily.    Continue ketoconazole 2% shampoo and try alternating with the OTC ketoconazole 1% shampoo.       We discussed that he is taking a very high dose biotin currently as he feels this helps with his mast cell activation syndrome.  As long as his kidney function remains normal (last creatinine was 1.36 in 10/2017), he should not develop toxic levels of this as it will be renally eliminated.  We discussed that biotin supplementation can lead to falsely elevated labs (including troponins according to a recent report).      Follow-up in 4-5 months for recheck.   Discussed and examined with [facility] dermatology chair Dr. Alicia Gan.    Leena Burkett MD  PGY-4, Dermatology      Patient was seen and examined with the dermatology resident. I agree with the history, review of systems, physical examination, assessments and plan.    Alicia Gan MD  Professor and  Chair  Department of Dermatology  [facility]

## 2017-12-12 NOTE — MR AVS SNAPSHOT
After Visit Summary   12/12/2017    Ricky Epstein    MRN: [id number]           Patient Information     Date Of Birth          1970        Visit Information        Provider Department      12/12/2017 1:30 PM Alicia Gan MD [facility] Dermatology        Today's Diagnoses     Dermatitis    -  1    Pruritus of scalp        Lichen plano-pilaris           Follow-ups after your visit        Who to contact     Please call your clinic at [phone] to:    Ask questions about your health    Make or cancel appointments    Discuss your medicines    Learn about your test results    Speak to your doctor   If you have compliments or concerns about an experience at your clinic, or if you wish to file a complaint, please contact [facility] Patient Relations at [phone] or email us at [email or web address]         Additional Information About Your Visit        MyChart Information     [employer] gives you secure access to your electronic health record. If you see a primary care provider, you can also send messages to your care team and make appointments. If you have questions, please call your primary care clinic.  If you do not have a primary care provider, please call [phone] and they will assist you.      RETAIL PRO is an electronic gateway that provides easy, online access to your medical records. With RETAIL PRO, you can request a clinic appointment, read your test results, renew a prescription or communicate with your care team.     To access your existing account, please contact your [facility] Clinic or call [phone] for assistance.        Care EveryWhere ID     This is your Care EveryWhere ID. This could be used by other organizations to access your [facility] medical records  [id number]        Your Vitals Were     Pulse                   73            Blood Pressure from Last 3 Encounters:   12/12/17 (!) 137/91   07/24/17  127/85   05/05/17 133/89    Weight from Last 3 Encounters:   07/24/17 108.5 kg (239 lb 4.8 oz)   04/14/17 109.9 kg (242 lb 4.8 oz)   01/06/17 106.2 kg (234 lb 1.6 oz)              Today, you had the following     No orders found for display       Primary Care Provider Office Phone # Fax #    Chad Penaloza [phone]       [facility] [address] [address]        Equal Access to Services     [facility]: Hadii aad ku hadasho Soomaali, waaxda luqadaha, qaybta kaalmada adeabdulkadiryashawanda, ashu johnston . So [facility] [phone].    ATENCIÓN: Si habla español, tiene a guerra disposición servicios gratuitos de asistencia lingüística. [facility] [phone].    We comply with applicable federal civil rights laws and [place] laws. We do not discriminate on the basis of race, color, national origin, age, disability, sex, sexual orientation, or gender identity.            Thank you!     Thank you for choosing [facility] DERMATOLOGY  for your care. Our goal is always to provide you with excellent care. Hearing back from our patients is one way we can continue to improve our services. Please take a few minutes to complete the written survey that you may receive in the mail after your visit with us. Thank you!             Your Updated Medication List - Protect others around you: Learn how to safely use, store and throw away your medicines at www.disposemymeds.org.          This list is accurate as of: 12/12/17 11:59 PM.  Always use your most recent med list.                   Brand Name Dispense Instructions for use Diagnosis    Biotin 2500 MCG Caps      Take 15,000 mcg by mouth daily        cromolyn 100 MG/5ML (HIGH CONC) solution    GASTROCROM    1200 mL    TAKE 10ML (200MG) BY MOUTH 4 TIMES DAILY, BEFORE MEALS AND NIGHTLY    Mast cell disease, systemic       cromolyn 4 % ophthalmic solution    OPTICROM    3 Bottle    Place 1 drop into both eyes 8 times daily     Mast cell disease, systemic       DHS ZINC 2 % Sham   Generic drug:  Pyrithione Zinc      Externally apply topically daily        fexofenadine 60 MG tablet    ALLEGRA    120 tablet    Take 3 tabs in am (180 mg) and 60 mg in mid afternoon    H/O lichen planopilaris       finasteride 5 MG tablet    PROSCAR    30 tablet    Take 1 tablet (5 mg) by mouth daily    Lichen planopilaris       hydroxychloroquine 200 MG tablet    PLAQUENIL    60 tablet    Take 1 tablet (200 mg) by mouth daily    Lichen planopilaris       iron 325 (65 FE) MG tablet      Take 1 tablet by mouth daily        * UNABLE TO FIND      1 teaspoonful daily MEDICATION NAME: Diatomaceous Earth        * UNABLE TO FIND      1 teaspoonful 6 times daily Organic Ground Shawanda        vitamin  B-12 250 MCG Tabs      Take by mouth daily        * Notice:  This list has 2 medication(s) that are the same as other medications prescribed for you. Read the directions carefully, and ask your doctor or other care provider to review them with you.

## 2017-12-12 NOTE — NURSING NOTE
"Dermatology Rooming Note    Ricky Epstein's goals for this visit include:   Chief Complaint   Patient presents with     Hair Loss     Ricky comes to clinic today for Lichenplanopilaris with component of androgenetic alopecia. States \"I think it's better.\"     Lashonda Cox, [facility]    "

## 2017-12-12 NOTE — LETTER
"12/12/2017       RE: Ricky Epstein  [address] [place] [address] [address] [place] [address]     Dear Colleague,    Thank you for referring your patient, Ricky Epstein, to the [facility] DERMATOLOGY at [facility]. Please see a copy of my visit note below.    [facility] Dermatology Note      Dermatology Problem List:  1. 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  - Past treatments: ILK 10  - Current treatments: hydroxychloroquine 200mg daily (last eye exam 10/2017, safety labs last checked 10/2017, due again 4/2018), finasteride 5mg daily, alternating ketoconazole 2% and 1% shampoos for seborrheic dermatitis    2. Mast cell activation syndrome, previously followed with Dr. Afrin  - Negative for c-kit mutation by genetics.  - Currently on cromolyn (oral and ophthalmic that he puts in shampoo - feels this was very helpful), ketotifen (oral and ophthalmic), fexofenadine, levocetirizine, vitamin B12, and iron sulfate.    Encounter Date: Dec 12, 2017    CC:   Chief Complaint   Patient presents with     Hair Loss     Ricky comes to clinic today for Lichenplanopilaris with component of androgenetic alopecia. States \"I think it's better.\"     History of Present Illness:  This 47 year old male here for a recheck of his scalp. He was last seen May 2017.  He feels that he continues to do better and better.  He mostly attributes this to treatment of his mast cell activation syndrome.  He feels that these treatments were more helpful for his hair loss and scalp symptoms than any other treatment.  He particularly thinks the mixing liquid cromolyn into his shampoo has been helpful for hair regrowth.  He recently started taking biotin and thinks this works as a mast cell deactivator.  Because it helps his scalp symptoms so much, he has been taking 5000mg in the morning then 2500mg every 3-4 hours.  He wonders if this is too much. He reports stable hair density without " increased hair shedding.  He is very happy with the results he currently has.    Past Medical History:   - Dermatitis  - Onycholysis of toenail  - Dysesthesia  - Lichen planopilaris  - Pruritus of scalp  - Mast cell disease, systemic    Medications:  Current Outpatient Prescriptions   Medication Sig Dispense Refill     Biotin 2500 MCG CAPS Take 15,000 mcg by mouth daily       hydroxychloroquine (PLAQUENIL) 200 MG tablet Take 1 tablet (200 mg) by mouth daily 60 tablet 5     cromolyn (GASTROCROM) 100 MG/5ML (HIGH CONC) solution TAKE 10ML (200MG) BY MOUTH 4 TIMES DAILY, BEFORE MEALS AND NIGHTLY 1200 mL 11     cromolyn (OPTICROM) 4 % ophthalmic solution Place 1 drop into both eyes 8 times daily 3 Bottle 11     UNABLE TO FIND 1 teaspoonful 6 times daily Organic Ground Shawanda       finasteride (PROSCAR) 5 MG tablet Take 1 tablet (5 mg) by mouth daily 30 tablet 3     UNABLE TO FIND 1 teaspoonful daily MEDICATION NAME: Diatomaceous Earth       fexofenadine (ALLEGRA) 60 MG tablet Take 3 tabs in am (180 mg) and 60 mg in mid afternoon 120 tablet 5     Pyrithione Zinc (DHS ZINC) 2 % SHAM Externally apply topically daily        Cyanocobalamin (VITAMIN  B-12) 250 MCG TABS Take by mouth daily       Ferrous Sulfate (IRON) 325 (65 FE) MG tablet Take 1 tablet by mouth daily          Allergies   Allergen Reactions     Penicillins Rash     Other reaction(s): Edema     Review of Systems:  -Constitutional:  Feeling well, in usual state of health. Notes intentional 40lb weight loss this year.  -Skin:  As per HPI, no additional concerns.    Physical exam:  GEN: This is a well developed, well-nourished male in no acute distress, in a pleasant mood.     SKIN: Focused examination of the scalp, face, hands and oral mucosa was performed.  -Generalized light pink macular erythema of the fronto/frontotemporal scalp, more on the left side than the right side today.  -Mild follicular accentuation and perifollicular scale on the left parietal scalp  in particular.  -Layers of regrowth: 1cm for the first layer, 6-7cm for the second layer, then out to the cut layer.  -LPPAI score of 1.67.  -Nails normal with dorsal pterygium formation.  -No other lesions of concern on areas examined.     Impression/Plan:  1. Lichenplanopilaris with component of androgenetic alopecia in setting of mast cell activation syndrome.  LPPAI score is 1.67 today due to current symptoms (last was 1.0), but clinically patient's scalp disease is improved.  His hair density is within normal limits and he has much milder erythema overall.  We will:    Continue hydroxychloroquine 200 mg daily. He will need safety labs in another four months (around 4/2018) and ophthalmology examination around 10/2018.    Continue finasteride 5 mg daily.    Continue ketoconazole 2% shampoo and try alternating with the OTC ketoconazole 1% shampoo.       We discussed that he is taking a very high dose biotin currently as he feels this helps with his mast cell activation syndrome.  As long as his kidney function remains normal (last creatinine was 1.36 in 10/2017), he should not develop toxic levels of this as it will be renally eliminated.  We discussed that biotin supplementation can lead to falsely elevated labs (including troponins according to a recent report).      Follow-up in 4-5 months for recheck.   Discussed and examined with [facility] dermatology chair Dr. Alicia Gna.    Leena Burkett MD  PGY-4, Dermatology      Patient was seen and examined with the dermatology resident. I agree with the history, review of systems, physical examination, assessments and plan.    Alicia Gan MD  Professor and  Chair  Department of Dermatology  [facility]                    Pictures taken of patient today to be placed in chart for future reference.      Again, thank you for allowing me to participate in the care of your patient.      Sincerely,    Alicia Gan MD

## 2018-10-15 ENCOUNTER — OFFICE VISIT (OUTPATIENT)
Dept: DERMATOLOGY | Facility: CLINIC | Age: 48
End: 2018-10-15
Payer: COMMERCIAL

## 2018-10-15 DIAGNOSIS — L66.10 LICHEN PLANOPILARIS: ICD-10-CM

## 2018-10-15 DIAGNOSIS — L66.10 LICHEN PLANOPILARIS: Primary | ICD-10-CM

## 2018-10-15 DIAGNOSIS — Z51.81 MEDICATION MONITORING ENCOUNTER: ICD-10-CM

## 2018-10-15 DIAGNOSIS — D89.40 MAST CELL ACTIVATION SYNDROME (H): ICD-10-CM

## 2018-10-15 DIAGNOSIS — D47.02 MAST CELL DISEASE, SYSTEMIC: Chronic | ICD-10-CM

## 2018-10-15 LAB
ALBUMIN SERPL-MCNC: 4.3 G/DL (ref 3.4–5)
ALP SERPL-CCNC: 69 U/L (ref 40–150)
ALT SERPL W P-5'-P-CCNC: 67 U/L (ref 0–70)
ANION GAP SERPL CALCULATED.3IONS-SCNC: 6 MMOL/L (ref 3–14)
AST SERPL W P-5'-P-CCNC: 58 U/L (ref 0–45)
BASOPHILS # BLD AUTO: 0 10E9/L (ref 0–0.2)
BASOPHILS NFR BLD AUTO: 0.7 %
BILIRUB SERPL-MCNC: 0.4 MG/DL (ref 0.2–1.3)
BUN SERPL-MCNC: 15 MG/DL (ref 7–30)
CALCIUM SERPL-MCNC: 8.9 MG/DL (ref 8.5–10.1)
CHLORIDE SERPL-SCNC: 106 MMOL/L (ref 94–109)
CO2 SERPL-SCNC: 26 MMOL/L (ref 20–32)
CREAT SERPL-MCNC: 1.22 MG/DL (ref 0.66–1.25)
DIFFERENTIAL METHOD BLD: NORMAL
EOSINOPHIL # BLD AUTO: 0 10E9/L (ref 0–0.7)
EOSINOPHIL NFR BLD AUTO: 0.4 %
ERYTHROCYTE [DISTWIDTH] IN BLOOD BY AUTOMATED COUNT: 12.6 % (ref 10–15)
GFR SERPL CREATININE-BSD FRML MDRD: 63 ML/MIN/1.7M2
GLUCOSE SERPL-MCNC: 85 MG/DL (ref 70–99)
HCT VFR BLD AUTO: 46.4 % (ref 40–53)
HGB BLD-MCNC: 15.6 G/DL (ref 13.3–17.7)
IMM GRANULOCYTES # BLD: 0 10E9/L (ref 0–0.4)
IMM GRANULOCYTES NFR BLD: 0.2 %
LYMPHOCYTES # BLD AUTO: 1.3 10E9/L (ref 0.8–5.3)
LYMPHOCYTES NFR BLD AUTO: 23.7 %
MCH RBC QN AUTO: 31.3 PG (ref 26.5–33)
MCHC RBC AUTO-ENTMCNC: 33.6 G/DL (ref 31.5–36.5)
MCV RBC AUTO: 93 FL (ref 78–100)
MONOCYTES # BLD AUTO: 0.4 10E9/L (ref 0–1.3)
MONOCYTES NFR BLD AUTO: 7.8 %
NEUTROPHILS # BLD AUTO: 3.7 10E9/L (ref 1.6–8.3)
NEUTROPHILS NFR BLD AUTO: 67.2 %
NRBC # BLD AUTO: 0 10*3/UL
NRBC BLD AUTO-RTO: 0 /100
PLATELET # BLD AUTO: 188 10E9/L (ref 150–450)
POTASSIUM SERPL-SCNC: 4 MMOL/L (ref 3.4–5.3)
PROT SERPL-MCNC: 7.3 G/DL (ref 6.8–8.8)
RBC # BLD AUTO: 4.99 10E12/L (ref 4.4–5.9)
SODIUM SERPL-SCNC: 138 MMOL/L (ref 133–144)
WBC # BLD AUTO: 5.5 10E9/L (ref 4–11)

## 2018-10-15 RX ORDER — HYDROXYCHLOROQUINE SULFATE 200 MG/1
200 TABLET, FILM COATED ORAL DAILY
Qty: 60 TABLET | Refills: 5 | Status: SHIPPED | OUTPATIENT
Start: 2018-10-15 | End: 2019-09-18

## 2018-10-15 RX ORDER — NIACINAMIDE 500 MG
TABLET, EXTENDED RELEASE ORAL
COMMUNITY
End: 2023-04-07

## 2018-10-15 RX ORDER — CROMOLYN SODIUM 40 MG/ML
1 SOLUTION/ DROPS OPHTHALMIC
Qty: 3 BOTTLE | Refills: 11 | Status: SHIPPED | OUTPATIENT
Start: 2018-10-15 | End: 2023-04-07

## 2018-10-15 RX ORDER — FINASTERIDE 5 MG/1
5 TABLET, FILM COATED ORAL DAILY
Qty: 30 TABLET | Refills: 5 | Status: SHIPPED | OUTPATIENT
Start: 2018-10-15 | End: 2019-09-18

## 2018-10-15 ASSESSMENT — PAIN SCALES - GENERAL: PAINLEVEL: NO PAIN (0)

## 2018-10-15 NOTE — LETTER
10/15/2018       RE: Ricky Epstein  [address] [place] [address] [address] [place] [address]     Dear Colleague,    Thank you for referring your patient, Ricky Epstein, to the [facility] DERMATOLOGY at [facility]. Please see a copy of my visit note below.    [facility] Dermatology Note      Dermatology Problem List:  1. 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  - Past treatments: ILK 10  - Current treatments: hydroxychloroquine 200mg daily (last eye exam 10/2017, safety labs last checked 10/2017, due again 4/2018), finasteride 5mg daily, alternating ketoconazole 2% and 1% shampoos for seborrheic dermatitis  -LPPAI: 1.67 (10/15/18)  -Needs ophtho exam in winter of 2019     2. Mast cell activation syndrome, previously followed with Dr. Afrin  - Negative for c-kit mutation by genetics.  - Currently on cromolyn (oral and ophthalmic that he puts in shampoo - feels this was very helpful), silica extract, nicotinamide, and biotin     Encounter Date: Oct 15, 2018     CC:   No chief complaint on file.     History of Present Illness:  Ricky Epstein is a  48 year old male history of lichen plano pilaris and androgenetic alopecia in the setting of mast cell activation syndrome who presents for follow up or LPP and hair loss. He was last seen on 12/12/2017, at which time we continued plaquenil 200 mg daily, ketoconazole shampoo for seb derm and finasteride 5 mg daily.     Since his last visit, the pt states he has intentionally lost 40 pounds, his scalp pain is no longer excruciating, and he has simplified his medications. He takes HCQ 200mg QDay, biotic, a silica supplement, and nicotinamide. The pt states he is not noticing new hair loss on his scalp, and he contineus to deny any hair loss anywhere else on his body. He states he is aware that biotin supplements can interact with blood tests, and he denies any impotence or sexual dysfunction with the finasteride. The pt is  requesting a refill of finasteride, HCQ, and cromylyn sulfate for mast cell activation syndrome. The cromyln was previous Rx'ed by Dr. Peralta, and he applies it to his scalp when he shampoos.     The pt denies any recurrent fevers, chills, weight loss, or hospitalizations. He notes that he saw ophtho a year ago, and he was told he need to see them every two years. Otherwise, the pt states he feels well, and he denies any other general or skin-specific complaints at this time.       Past Medical History:   Patient Active Problem List   Diagnosis     Dermatitis     Onycholysis of toenail     Dysesthesia     Lichen plano-pilaris     Pruritus of scalp     Idiopathic mast cell activation syndrome (H)     History reviewed. No pertinent past medical history.  History reviewed. No pertinent surgical history.    Social History:   reports that he has never smoked. He has never used smokeless tobacco.    Family History:  Family History   Problem Relation Age of Onset     Cancer No family hx of      no skin cancer     Skin Cancer No family hx of      Melanoma No family hx of        Medications:  Current Outpatient Prescriptions   Medication Sig Dispense Refill     Biotin 2500 MCG CAPS Take 15,000 mcg by mouth daily       cromolyn (GASTROCROM) 100 MG/5ML (HIGH CONC) solution TAKE 10ML (200MG) BY MOUTH 4 TIMES DAILY, BEFORE MEALS AND NIGHTLY 1200 mL 11     finasteride (PROSCAR) 5 MG tablet Take 1 tablet (5 mg) by mouth daily 30 tablet 3     hydroxychloroquine (PLAQUENIL) 200 MG tablet Take 1 tablet (200 mg) by mouth daily 60 tablet 5     Niacinamide 500 MG TBCR        Pyrithione Zinc (DHS ZINC) 2 % SHAM Externally apply topically daily        UNABLE TO FIND 1 teaspoonful daily MEDICATION NAME: Diatomaceous Earth       cromolyn (OPTICROM) 4 % ophthalmic solution Place 1 drop into both eyes 8 times daily (Patient not taking: Reported on 10/15/2018) 3 Bottle 11     Cyanocobalamin (VITAMIN  B-12) 250 MCG TABS Take by mouth daily        Ferrous Sulfate (IRON) 325 (65 FE) MG tablet Take 1 tablet by mouth daily       fexofenadine (ALLEGRA) 60 MG tablet Take 3 tabs in am (180 mg) and 60 mg in mid afternoon (Patient not taking: Reported on 10/15/2018) 120 tablet 5     UNABLE TO FIND 1 teaspoonful 6 times daily Organic Ground Shawanda          Allergies   Allergen Reactions     Penicillins Rash     Other reaction(s): Edema         Review of Systems:  -Constitutional: The patient denies fatigue, fevers, chills, unintended weight loss, and night sweats.  -Skin: As above in HPI. No additional skin concerns.    Physical exam:  Vitals: There were no vitals taken for this visit.  GEN: This is a well developed, well-nourished male in no acute distress, in a pleasant mood.    SKIN: Focused examination of the scalp, face, neck, right and left arms, hands,and finer nails was performed.  -No pitting of finger nails  -Pink inflammation of frontal and vertex scalp with mild perifollicular inflammation, particular around the temples. LPPAI score of 1.67 today  -There is some decreased hair density in the frontal scalp, but there is normal density on the occipital and temporal scalp  -No loss of arm hair, eyelashes, or eyebrows  -No atrophy of the forehead  -No other lesions of concern on areas examined.     Impression/Plan:  1. Alopecia--LPP with AGA    The pt states he is doing well, and clinically, his scalp is significantly less red compared to previous visits. Compared to pictures from the last visit, he appears to have hair regrowth at the temples, part line, and decreased redness    Will obtain HCQ CBC and CMP safety labs today. Pt will need to see ophtho next year    Will provide the pt with a 6 month supply of HCQ and finasteride 5mg. Reviewed risks of sexual dysfunction with finasteride. Reviewed that biotin may interfere with blood tests    We discussed stopping the HCQ today, but the pt does not want to given that his scalp pain is finally under  control    2. Mast cell activation syndrome    The pt believes the cromolyn is helping. He has not followed with Dr. Peralta to [place]. Discussed that we can refill today, however, he will need to get ongoing refills through this PCP      Follow-up in 6 months, earlier for new or changing lesions.     Dr. Gan staffed the patient.    Staff Involved:  Resident(Al Sebastian)/Staff(as above)      Patient was seen and examined with the dermatology resident. I agree with the history, review of systems, physical examination, assessments and plan.    Alicia Gan MD  Professor and  Chair  Department of Dermatology  [facility]

## 2018-10-15 NOTE — MR AVS SNAPSHOT
After Visit Summary   10/15/2018    Ricky Epstein    MRN: [id number]           Patient Information     Date Of Birth          1970        Visit Information        Provider Department      10/15/2018 12:50 PM Alicia Gan MD [facility] Dermatology        Today's Diagnoses     Lichen planopilaris    -  1    Mast cell activation syndrome (H)        Mast cell disease, systemic        Lichen planopilaris        Medication monitoring encounter           Follow-ups after your visit        Follow-up notes from your care team     Return in about 6 months (around 4/15/2019).      Who to contact     Please call your clinic at [phone] to:    Ask questions about your health    Make or cancel appointments    Discuss your medicines    Learn about your test results    Speak to your doctor            Additional Information About Your Visit        12Bishart Information     [employer] gives you secure access to your electronic health record. If you see a primary care provider, you can also send messages to your care team and make appointments. If you have questions, please call your primary care clinic.  If you do not have a primary care provider, please call [phone] and they will assist you.      [employer] is an electronic gateway that provides easy, online access to your medical records. With [employer], you can request a clinic appointment, read your test results, renew a prescription or communicate with your care team.     To access your existing account, please contact your [facility] or call [phone] for assistance.        Care EveryWhere ID     This is your Care EveryWhere ID. This could be used by other organizations to access your [facility] medical records  [id number]         Blood Pressure from Last 3 Encounters:   12/12/17 (!) 137/91   07/24/17 127/85   05/05/17 133/89    Weight from Last 3 Encounters:   07/24/17 108.5 kg (239 lb 4.8 oz)   04/14/17 109.9 kg  (242 lb 4.8 oz)   01/06/17 106.2 kg (234 lb 1.6 oz)              We Performed the Following     Comprehensive metabolic panel          Where to get your medicines      These medications were sent to [facility] [address] [place] [address] [address] [facility]  [address] [facility] [address]     Phone:  [phone]     cromolyn 4 % ophthalmic solution    finasteride 5 MG tablet    hydroxychloroquine 200 MG tablet          Primary Care Provider    Chad Penaloza MD       No address on file        Equal Access to Services     [facility] CenterGUALBERTO : Hadii aad ku hadasho Soomaali, waaxda luqadaha, qaybta kaalmada adeegyada, waxay charlenein hayjaye johnston . So [facility] [phone].    ATENCIÓN: Si habla español, tiene a guerra disposición servicios gratuitos de asistencia lingüística. LlBl[facility] System [phone].    We comply with applicable federal civil rights laws and [place] laws. We do not discriminate on the basis of race, color, national origin, age, disability, sex, sexual orientation, or gender identity.            Thank you!     Thank you for choosing [facility] DERMATOLOGY  for your care. Our goal is always to provide you with excellent care. Hearing back from our patients is one way we can continue to improve our services. Please take a few minutes to complete the written survey that you may receive in the mail after your visit with us. Thank you!             Your Updated Medication List - Protect others around you: Learn how to safely use, store and throw away your medicines at www.disposemymeds.org.          This list is accurate as of 10/15/18 11:59 PM.  Always use your most recent med list.                   Brand Name Dispense Instructions for use Diagnosis    Biotin 2500 MCG Caps      Take 15,000 mcg by mouth daily        cromolyn 100 MG/5ML (HIGH CONC) solution    GASTROCROM    1200 mL    TAKE 10ML (200MG) BY MOUTH 4 TIMES DAILY, BEFORE MEALS AND NIGHTLY    Mast cell disease, systemic       cromolyn 4  % ophthalmic solution    OPTICROM    3 Bottle    Place 1 drop into both eyes 8 times daily    Mast cell disease, systemic       cyanocobalamin 250 MCG tablet    vitamin  B-12     Take by mouth daily        DHS ZINC 2 % Sham   Generic drug:  Pyrithione Zinc      Externally apply topically daily        fexofenadine 60 MG tablet    ALLEGRA    120 tablet    Take 3 tabs in am (180 mg) and 60 mg in mid afternoon    H/O lichen planopilaris       finasteride 5 MG tablet    PROSCAR    30 tablet    Take 1 tablet (5 mg) by mouth daily    Lichen planopilaris       hydroxychloroquine 200 MG tablet    PLAQUENIL    60 tablet    Take 1 tablet (200 mg) by mouth daily    Lichen planopilaris       iron 325 (65 Fe) MG tablet      Take 1 tablet by mouth daily        Niacinamide 500 MG Tbcr           UNABLE TO FIND      1 teaspoonful daily MEDICATION NAME: Diatomaceous Earth        UNABLE TO FIND      1 teaspoonful 6 times daily Organic Ground [place]

## 2018-10-15 NOTE — PROGRESS NOTES
[facility] Dermatology Note      Dermatology Problem List:  1. 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  - Past treatments: ILK 10  - Current treatments: hydroxychloroquine 200mg daily (last eye exam 10/2017, safety labs last checked 10/2017, due again 4/2018), finasteride 5mg daily, alternating ketoconazole 2% and 1% shampoos for seborrheic dermatitis    2. Mast cell activation syndrome, previously followed with Dr. Afrin  - Negative for c-kit mutation by genetics.  - Currently on cromolyn (oral and ophthalmic that he puts in shampoo - feels this was very helpful), ketotifen (oral and ophthalmic), fexofenadine, levocetirizine, vitamin B12, and iron sulfate.    Encounter Date: Oct 15, 2018    CC:   No chief complaint on file.    History of Present Illness:  Ricky Epstein is a  48 year old male history of lichen plano pilaris and androgenetic alopecia in the setting of mast cell activation syndrome who presents for follow up or LPP and hair loss. He was last seen on 12/12/2017, at which time we continued plaquenil 200 mg daily, ketoconazole shampoo for seb derm and finasteride 5 mg daily. He is certainly due for safety labs, as these were previously discussed to be done around six months ago. In addition, he will need an opathalmology exam     LABS     EYE EXAM     Overall, he feels ***     Needs refills on:     Biotin use?       Past Medical History:   - Dermatitis  - Onycholysis of toenail  - Dysesthesia  - Lichen planopilaris  - Pruritus of scalp  - Mast cell disease, systemic    Medications:  Current Outpatient Prescriptions   Medication Sig Dispense Refill     Biotin 2500 MCG CAPS Take 15,000 mcg by mouth daily       cromolyn (GASTROCROM) 100 MG/5ML (HIGH CONC) solution TAKE 10ML (200MG) BY MOUTH 4 TIMES DAILY, BEFORE MEALS AND NIGHTLY 1200 mL 11     cromolyn (OPTICROM) 4 % ophthalmic solution Place 1 drop into both eyes 8 times daily 3 Bottle 11     Cyanocobalamin  (VITAMIN  B-12) 250 MCG TABS Take by mouth daily       Ferrous Sulfate (IRON) 325 (65 FE) MG tablet Take 1 tablet by mouth daily       fexofenadine (ALLEGRA) 60 MG tablet Take 3 tabs in am (180 mg) and 60 mg in mid afternoon 120 tablet 5     finasteride (PROSCAR) 5 MG tablet Take 1 tablet (5 mg) by mouth daily 30 tablet 3     hydroxychloroquine (PLAQUENIL) 200 MG tablet Take 1 tablet (200 mg) by mouth daily 60 tablet 5     Pyrithione Zinc (DHS ZINC) 2 % SHAM Externally apply topically daily        UNABLE TO FIND 1 teaspoonful 6 times daily Organic Ground Shawanda       UNABLE TO FIND 1 teaspoonful daily MEDICATION NAME: Diatomaceous Earth          Allergies   Allergen Reactions     Penicillins Rash     Other reaction(s): Edema     Review of Systems:  -Constitutional:  Feeling well, in usual state of health. Notes intentional 40lb weight loss this year.  -Skin:  As per HPI, no additional concerns.    Physical exam:  GEN: This is a well developed, well-nourished male in no acute distress, in a pleasant mood.     SKIN: Focused examination of the scalp, face, hands and oral mucosa was performed.          Orders Only on 10/30/2017   Component Date Value Ref Range Status     WBC 10/30/2017 6.3  4.0 - 11.0 10e9/L Final     RBC Count 10/30/2017 4.97  4.4 - 5.9 10e12/L Final     Hemoglobin 10/30/2017 15.5  13.3 - 17.7 g/dL Final     Hematocrit 10/30/2017 46.0  40.0 - 53.0 % Final     MCV 10/30/2017 93  78 - 100 fl Final     MCH 10/30/2017 31.2  26.5 - 33.0 pg Final     MCHC 10/30/2017 33.7  31.5 - 36.5 g/dL Final     RDW 10/30/2017 13.2  10.0 - 15.0 % Final     Platelet Count 10/30/2017 215  150 - 450 10e9/L Final     Diff Method 10/30/2017 Automated Method   Final     % Neutrophils 10/30/2017 74.4  % Final     % Lymphocytes 10/30/2017 16.3  % Final     % Monocytes 10/30/2017 8.1  % Final     % Eosinophils 10/30/2017 0.5  % Final     % Basophils 10/30/2017 0.5  % Final     % Immature Granulocytes 10/30/2017 0.2  % Final      Nucleated RBCs 10/30/2017 0  0 /100 Final     Absolute Neutrophil 10/30/2017 4.7  1.6 - 8.3 10e9/L Final     Absolute Lymphocytes 10/30/2017 1.0  0.8 - 5.3 10e9/L Final     Absolute Monocytes 10/30/2017 0.5  0.0 - 1.3 10e9/L Final     Absolute Eosinophils 10/30/2017 0.0  0.0 - 0.7 10e9/L Final     Absolute Basophils 10/30/2017 0.0  0.0 - 0.2 10e9/L Final     Abs Immature Granulocytes 10/30/2017 0.0  0 - 0.4 10e9/L Final     Absolute Nucleated RBC 10/30/2017 0.0   Final     Sodium 10/30/2017 139  133 - 144 mmol/L Final     Potassium 10/30/2017 3.8  3.4 - 5.3 mmol/L Final     Chloride 10/30/2017 104  94 - 109 mmol/L Final     Carbon Dioxide 10/30/2017 26  20 - 32 mmol/L Final     Anion Gap 10/30/2017 8  3 - 14 mmol/L Final     Glucose 10/30/2017 95  70 - 99 mg/dL Final     Urea Nitrogen 10/30/2017 16  7 - 30 mg/dL Final     Creatinine 10/30/2017 1.36* 0.66 - 1.25 mg/dL Final     GFR Estimate 10/30/2017 56* >60 mL/min/1.7m2 Final    Non  GFR Calc     GFR Estimate If Black 10/30/2017 67  >60 mL/min/1.7m2 Final    African American GFR Calc     Calcium 10/30/2017 9.1  8.5 - 10.1 mg/dL Final     Bilirubin Total 10/30/2017 0.6  0.2 - 1.3 mg/dL Final     Albumin 10/30/2017 4.3  3.4 - 5.0 g/dL Final     Protein Total 10/30/2017 7.3  6.8 - 8.8 g/dL Final     Alkaline Phosphatase 10/30/2017 96  40 - 150 U/L Final     ALT 10/30/2017 44  0 - 70 U/L Final     AST 10/30/2017 32  0 - 45 U/L Final         Impression/Plan:              Follow-up in *** months for recheck.   Discussed and examined with [facility] dermatology chair Dr. Alicia Gan.

## 2018-10-15 NOTE — PROGRESS NOTES
[facility] Dermatology Note      Dermatology Problem List:  1. 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  - Past treatments: ILK 10  - Current treatments: hydroxychloroquine 200mg daily (last eye exam 10/2017, safety labs last checked 10/2017, due again 4/2018), finasteride 5mg daily, alternating ketoconazole 2% and 1% shampoos for seborrheic dermatitis  -LPPAI: 1.67 (10/15/18)  -Needs ophtho exam in winter of 2019     2. Mast cell activation syndrome, previously followed with Dr. Afrin  - Negative for c-kit mutation by genetics.  - Currently on cromolyn (oral and ophthalmic that he puts in shampoo - feels this was very helpful), silica extract, nicotinamide, and biotin     Encounter Date: Oct 15, 2018     CC:   No chief complaint on file.     History of Present Illness:  Ricky Epstein is a  48 year old male history of lichen plano pilaris and androgenetic alopecia in the setting of mast cell activation syndrome who presents for follow up or LPP and hair loss. He was last seen on 12/12/2017, at which time we continued plaquenil 200 mg daily, ketoconazole shampoo for seb derm and finasteride 5 mg daily.     Since his last visit, the pt states he has intentionally lost 40 pounds, his scalp pain is no longer excruciating, and he has simplified his medications. He takes HCQ 200mg QDay, biotic, a silica supplement, and nicotinamide. The pt states he is not noticing new hair loss on his scalp, and he contineus to deny any hair loss anywhere else on his body. He states he is aware that biotin supplements can interact with blood tests, and he denies any impotence or sexual dysfunction with the finasteride. The pt is requesting a refill of finasteride, HCQ, and cromylyn sulfate for mast cell activation syndrome. The cromyln was previous Rx'ed by Dr. Peralta, and he applies it to his scalp when he shampoos.     The pt denies any recurrent fevers, chills, weight loss, or hospitalizations.  He notes that he saw ophtho a year ago, and he was told he need to see them every two years. Otherwise, the pt states he feels well, and he denies any other general or skin-specific complaints at this time.       Past Medical History:   Patient Active Problem List   Diagnosis     Dermatitis     Onycholysis of toenail     Dysesthesia     Lichen plano-pilaris     Pruritus of scalp     Idiopathic mast cell activation syndrome (H)     History reviewed. No pertinent past medical history.  History reviewed. No pertinent surgical history.    Social History:   reports that he has never smoked. He has never used smokeless tobacco.    Family History:  Family History   Problem Relation Age of Onset     Cancer No family hx of      no skin cancer     Skin Cancer No family hx of      Melanoma No family hx of        Medications:  Current Outpatient Prescriptions   Medication Sig Dispense Refill     Biotin 2500 MCG CAPS Take 15,000 mcg by mouth daily       cromolyn (GASTROCROM) 100 MG/5ML (HIGH CONC) solution TAKE 10ML (200MG) BY MOUTH 4 TIMES DAILY, BEFORE MEALS AND NIGHTLY 1200 mL 11     finasteride (PROSCAR) 5 MG tablet Take 1 tablet (5 mg) by mouth daily 30 tablet 3     hydroxychloroquine (PLAQUENIL) 200 MG tablet Take 1 tablet (200 mg) by mouth daily 60 tablet 5     Niacinamide 500 MG TBCR        Pyrithione Zinc (DHS ZINC) 2 % SHAM Externally apply topically daily        UNABLE TO FIND 1 teaspoonful daily MEDICATION NAME: Diatomaceous Earth       cromolyn (OPTICROM) 4 % ophthalmic solution Place 1 drop into both eyes 8 times daily (Patient not taking: Reported on 10/15/2018) 3 Bottle 11     Cyanocobalamin (VITAMIN  B-12) 250 MCG TABS Take by mouth daily       Ferrous Sulfate (IRON) 325 (65 FE) MG tablet Take 1 tablet by mouth daily       fexofenadine (ALLEGRA) 60 MG tablet Take 3 tabs in am (180 mg) and 60 mg in mid afternoon (Patient not taking: Reported on 10/15/2018) 120 tablet 5     UNABLE TO FIND 1 teaspoonful 6 times  daily Organic Ground Shawanda          Allergies   Allergen Reactions     Penicillins Rash     Other reaction(s): Edema         Review of Systems:  -Constitutional: The patient denies fatigue, fevers, chills, unintended weight loss, and night sweats.  -Skin: As above in HPI. No additional skin concerns.    Physical exam:  Vitals: There were no vitals taken for this visit.  GEN: This is a well developed, well-nourished male in no acute distress, in a pleasant mood.    SKIN: Focused examination of the scalp, face, neck, right and left arms, hands,and finer nails was performed.  -No pitting of finger nails  -Pink inflammation of frontal and vertex scalp with mild perifollicular inflammation, particular around the temples. LPPAI score of 1.67 today  -There is some decreased hair density in the frontal scalp, but there is normal density on the occipital and temporal scalp  -No loss of arm hair, eyelashes, or eyebrows  -No atrophy of the forehead  -No other lesions of concern on areas examined.     Impression/Plan:  1. Alopecia--LPP with AGA    The pt states he is doing well, and clinically, his scalp is significantly less red compared to previous visits. Compared to pictures from the last visit, he appears to have hair regrowth at the temples, part line, and decreased redness    Will obtain HCQ CBC and CMP safety labs today. Pt will need to see ophtho next year    Will provide the pt with a 6 month supply of HCQ and finasteride 5mg. Reviewed risks of sexual dysfunction with finasteride. Reviewed that biotin may interfere with blood tests    We discussed stopping the HCQ today, but the pt does not want to given that his scalp pain is finally under control    2. Mast cell activation syndrome    The pt believes the cromolyn is helping. He has not followed with Dr. Peralta to [place]. Discussed that we can refill today, however, he will need to get ongoing refills through this PCP      Follow-up in 6 months, earlier for new or  changing lesions.     Dr. Gan staffed the patient.    Staff Involved:  Resident(Al Sebastian)/Staff(as above)      Patient was seen and examined with the dermatology resident. I agree with the history, review of systems, physical examination, assessments and plan.    Alicia Gan MD  Professor and  Chair  Department of Dermatology  [facility]

## 2018-10-15 NOTE — NURSING NOTE
Dermatology Rooming Note    Ricky Epstein's goals for this visit include:   Chief Complaint   Patient presents with     BRITTANY García is returning to discuss scalp inflamation.     Elida West LPN

## 2018-11-14 ENCOUNTER — MYC MEDICAL ADVICE (OUTPATIENT)
Dept: DERMATOLOGY | Facility: CLINIC | Age: 48
End: 2018-11-14

## 2018-11-14 DIAGNOSIS — D47.02 MAST CELL DISEASE, SYSTEMIC: ICD-10-CM

## 2018-11-14 RX ORDER — CROMOLYN SODIUM 100 MG/5ML
SOLUTION, CONCENTRATE ORAL
Qty: 1200 ML | Refills: 11 | Status: CANCELLED | OUTPATIENT
Start: 2018-11-14

## 2018-11-15 RX ORDER — CROMOLYN SODIUM 100 MG/5ML
SOLUTION, CONCENTRATE ORAL
Qty: 1200 ML | Refills: 0 | Status: SHIPPED | OUTPATIENT
Start: 2018-11-15 | End: 2023-04-07

## 2018-11-15 NOTE — TELEPHONE ENCOUNTER
cromolyn (GASTROCROM) 100 MG/5ML (HIGH CONC) solution   Last Written Prescription Date:  10/17/17  Last Fill Quantity: 1200ml,   # refills: 11  Last Office Visit 10/15/18  Future Office visit: none    Routing refill request to provider for review/approval because: see clinic note   10/15/18. ophth drops filled by  Derm, requests oral now.

## 2018-11-15 NOTE — TELEPHONE ENCOUNTER
Received refill request for cromolyn as the resident on call. Reviewed patient's chart and attached communication. Patient last seen 10/15/18 for alopecia and mast cell activation syndrome. RTC 6 months. Of note, in this visit, plan was to refill the cromolyn but advised that further refills should come from PCP. Cromolyn eye drops were sent but not oral cromolyn as planned. After reviewing the medication list and assessment and plan from last visit, the refill request was accepted for 1 month's supply until patient can discuss with PCP.    Routed as FYSANJANA.    Lindy Tam MD  Medicine-Dermatology PGY-5  [phone]

## 2018-12-06 DIAGNOSIS — D47.02 MAST CELL DISEASE, SYSTEMIC: ICD-10-CM

## 2018-12-10 RX ORDER — CROMOLYN SODIUM 100 MG/5ML
SOLUTION, CONCENTRATE ORAL
Refills: 0 | OUTPATIENT
Start: 2018-12-10

## 2019-02-04 ENCOUNTER — RECORDS - HEALTHEAST (OUTPATIENT)
Dept: LAB | Facility: CLINIC | Age: 49
End: 2019-02-04

## 2019-02-04 LAB
CHOLEST SERPL-MCNC: 176 MG/DL
FASTING STATUS PATIENT QL REPORTED: NO
HDLC SERPL-MCNC: 69 MG/DL
LDLC SERPL CALC-MCNC: 99 MG/DL
PSA SERPL-MCNC: 1.2 NG/ML (ref 0–3.5)
TRIGL SERPL-MCNC: 39 MG/DL

## 2019-05-06 ENCOUNTER — TRANSFERRED RECORDS (OUTPATIENT)
Dept: HEALTH INFORMATION MANAGEMENT | Facility: CLINIC | Age: 49
End: 2019-05-06

## 2019-09-16 ENCOUNTER — OFFICE VISIT (OUTPATIENT)
Dept: DERMATOLOGY | Facility: CLINIC | Age: 49
End: 2019-09-16
Payer: COMMERCIAL

## 2019-09-16 VITALS — DIASTOLIC BLOOD PRESSURE: 89 MMHG | SYSTOLIC BLOOD PRESSURE: 128 MMHG

## 2019-09-16 DIAGNOSIS — Z51.81 MEDICATION MONITORING ENCOUNTER: ICD-10-CM

## 2019-09-16 DIAGNOSIS — L72.9 CYST OF SKIN: Primary | ICD-10-CM

## 2019-09-16 DIAGNOSIS — L66.10 LICHEN PLANOPILARIS: ICD-10-CM

## 2019-09-16 LAB
ALBUMIN SERPL-MCNC: 4.1 G/DL (ref 3.4–5)
ALP SERPL-CCNC: 94 U/L (ref 40–150)
ALT SERPL W P-5'-P-CCNC: 42 U/L (ref 0–70)
ANION GAP SERPL CALCULATED.3IONS-SCNC: 3 MMOL/L (ref 3–14)
AST SERPL W P-5'-P-CCNC: 29 U/L (ref 0–45)
BASOPHILS # BLD AUTO: 0 10E9/L (ref 0–0.2)
BASOPHILS NFR BLD AUTO: 0.3 %
BILIRUB SERPL-MCNC: 0.4 MG/DL (ref 0.2–1.3)
BUN SERPL-MCNC: 13 MG/DL (ref 7–30)
CALCIUM SERPL-MCNC: 9 MG/DL (ref 8.5–10.1)
CHLORIDE SERPL-SCNC: 104 MMOL/L (ref 94–109)
CO2 SERPL-SCNC: 29 MMOL/L (ref 20–32)
CREAT SERPL-MCNC: 1.11 MG/DL (ref 0.66–1.25)
DIFFERENTIAL METHOD BLD: NORMAL
EOSINOPHIL # BLD AUTO: 0 10E9/L (ref 0–0.7)
EOSINOPHIL NFR BLD AUTO: 0.7 %
ERYTHROCYTE [DISTWIDTH] IN BLOOD BY AUTOMATED COUNT: 12.3 % (ref 10–15)
GFR SERPL CREATININE-BSD FRML MDRD: 76 ML/MIN/{1.73_M2}
GLUCOSE SERPL-MCNC: 84 MG/DL (ref 70–99)
HCT VFR BLD AUTO: 46.5 % (ref 40–53)
HGB BLD-MCNC: 15.5 G/DL (ref 13.3–17.7)
IMM GRANULOCYTES # BLD: 0 10E9/L (ref 0–0.4)
IMM GRANULOCYTES NFR BLD: 0 %
LYMPHOCYTES # BLD AUTO: 1.3 10E9/L (ref 0.8–5.3)
LYMPHOCYTES NFR BLD AUTO: 22 %
MCH RBC QN AUTO: 31.4 PG (ref 26.5–33)
MCHC RBC AUTO-ENTMCNC: 33.3 G/DL (ref 31.5–36.5)
MCV RBC AUTO: 94 FL (ref 78–100)
MONOCYTES # BLD AUTO: 0.5 10E9/L (ref 0–1.3)
MONOCYTES NFR BLD AUTO: 7.7 %
NEUTROPHILS # BLD AUTO: 4.2 10E9/L (ref 1.6–8.3)
NEUTROPHILS NFR BLD AUTO: 69.3 %
NRBC # BLD AUTO: 0 10*3/UL
NRBC BLD AUTO-RTO: 0 /100
PLATELET # BLD AUTO: 224 10E9/L (ref 150–450)
POTASSIUM SERPL-SCNC: 3.8 MMOL/L (ref 3.4–5.3)
PROT SERPL-MCNC: 7.1 G/DL (ref 6.8–8.8)
RBC # BLD AUTO: 4.94 10E12/L (ref 4.4–5.9)
SODIUM SERPL-SCNC: 136 MMOL/L (ref 133–144)
WBC # BLD AUTO: 6.1 10E9/L (ref 4–11)

## 2019-09-16 ASSESSMENT — PAIN SCALES - GENERAL: PAINLEVEL: NO PAIN (0)

## 2019-09-16 NOTE — LETTER
9/16/2019       RE: Ricky Epstein  [address] [place] [address] Dr RAMA [place] [address]     Dear Colleague,    Thank you for referring your patient, Ricky Epstein, to the [facility] DERMATOLOGY at [facility]. Please see a copy of my visit note below.    [facility] Dermatology Note      Dermatology Problem List:  1. 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 Mast cell activation syndrome, previously followed with Dr Peralta:  - Negative for c-kit mutation on genetic testing  - Current tx: silica extract and biotin supplements  - Past tx: topical cromolyn, stopped taking early August    Encounter Date: Sep 16, 2019    CC:  Chief Complaint   Patient presents with     Hair/Scalp Problem     Ricky is here for a follow up appt for LPP. Ricky notes overall decrease in hair loss since last visit.         History of Present Illness:  Mr. Ricky Epstein is a 49 year old male who presents as a follow-up for lichen planopilaris and mast cell activation syndrome. The patient was last seen 10/15/2018 when he was continued on plaquenil and finasteride and plaquenil CBC and CMP safety labs were drawn. Since last visit, he reports significant improvement in his scalp symptoms. He reports some mild pain in his scalp on examination today, but denies pruritus or burning. He has stopped applying topical cromolyn to his scalp because he believes this was causing scalp pain and tingling. He is currently still using plaquenil 200 mg daily and  5 mg finasteride daily. He washes his hair daily with Head and Shoulders shampoo. He denies any side effects from these medications, and states that his last eye exam in 11/2017 was normal.     He also reports an area on his left calf that he has had for  many years but would like evaluated. He denies any pain or change in size in this area. It does not change with prolonged standing. He does not recall any trauma that occurred in the area that led to this lesion.     Past Medical History:   Patient Active Problem List   Diagnosis     Dermatitis     Onycholysis of toenail     Dysesthesia     Lichen plano-pilaris     Pruritus of scalp     Idiopathic mast cell activation syndrome (H)     No past medical history on file.  No past surgical history on file.    Social History:  Patient reports that he has never smoked. He has never used smokeless tobacco.    Family History:  Family History   Problem Relation Age of Onset     Cancer No family hx of         no skin cancer     Skin Cancer No family hx of      Melanoma No family hx of        Medications:  Current Outpatient Medications   Medication Sig Dispense Refill     Biotin 2500 MCG CAPS Take 15,000 mcg by mouth daily       finasteride (PROSCAR) 5 MG tablet Take 1 tablet (5 mg) by mouth daily 30 tablet 5     hydroxychloroquine (PLAQUENIL) 200 MG tablet Take 1 tablet (200 mg) by mouth daily 60 tablet 5     Pyrithione Zinc (DHS ZINC) 2 % SHAM Externally apply topically daily        UNABLE TO FIND 1 teaspoonful daily MEDICATION NAME: Diatomaceous Earth       cromolyn (GASTROCROM) 100 MG/5ML (HIGH CONC) solution TAKE 10ML (200MG) BY MOUTH 4 TIMES DAILY, BEFORE MEALS AND NIGHTLY (Patient not taking: Reported on 9/16/2019) 1200 mL 0     cromolyn (OPTICROM) 4 % ophthalmic solution Place 1 drop into both eyes 8 times daily (Patient not taking: Reported on 9/16/2019) 3 Bottle 11     Cyanocobalamin (VITAMIN  B-12) 250 MCG TABS Take by mouth daily       Ferrous Sulfate (IRON) 325 (65 FE) MG tablet Take 1 tablet by mouth daily       fexofenadine (ALLEGRA) 60 MG tablet Take 3 tabs in am (180 mg) and 60 mg in mid afternoon (Patient not taking: Reported on 10/15/2018) 120 tablet 5     Niacinamide 500 MG TBCR        UNABLE TO FIND 1  teaspoonful 6 times daily Organic Ground Shawanda       Allergies   Allergen Reactions     Penicillins Rash     Other reaction(s): Edema         Review of Systems:  -Constitutional: Otherwise feeling well today, in usual state of health.  -HEENT: Patient denies nonhealing oral sores.  -Skin: As above in HPI. No additional skin concerns.    Physical exam:  Vitals: /89   GEN: This is a well developed, well-nourished male in no acute distress, in a pleasant mood.    SKIN: Focused examination of the scalp, face, and leg was performed.  -Irwin skin type: II  -Mild perifollicular scalp noted throughout scalp. LPPAI score of 0.67 today  -Normal density in frontal, occipital, and temporal scalp  -No loss of arm hair, eyelashes, or eyebrows  -1cm well-defined mobile subcutaneous nodule in left anterior lower leg  -No other lesions of concern on areas examined.     Impression/Plan:  1. Lichen planopilaris with androgenetic alopecia - improved symptomatically. Patient subjectively believes his hair loss has improved. Hair compared to photos from last visit demonstrates hair regrowth at temples, part line, and decreased scalp erythema    Plaquenil safety labs of CBC and CMP ordered today. Patient will need to see ophthalmology this winter (last visit 11/2017).    Re-filled plaquenil and finasteride today. Continue with 200 mg daily of plaquenil and 5 mg of finasteride daily.     Continue with Head and Shoulders shampoo for scalp erythema and scale    Patient continues to use silica supplement and biotin for scalp symptoms. Ok to continue for treatment of scalp symptoms. Discussed with patient that proper dosing can be difficult with supplements.    2. Mast cell activation syndrome:    Patient has not followed Dr. Peralta to [place], currently only using biotin and silica supplements for scalp symptoms    3. Subcutaneous nodule:    Discussed with patient potential differential diagnosis of this lesion, including a  vascular varicosity, cyst, or lipoma.     Will refer patient to derm surgery for excision of mass    Follow-up in 6 months, earlier for new or changing lesions.     Staff Involved:  I, Angelina Virgen, MS4, saw and examined the patient in the presence of Dr. Gan.    Staff Physician:  I was present with the medical student who participated in the service and in the documentation of the note. I have verified the history and personally performed the physical exam and medical decision making. I agree with the assessment and plan of care as documented in the note.       Alicia Gan MD  Professor and Chair  Department of Dermatology  [facility]: Phone: [phone], Fax:[phone]  [facility]: Phone: [phone], Fax: [phone]

## 2019-09-16 NOTE — NURSING NOTE
Chief Complaint   Patient presents with     Hair/Scalp Problem     Ricky is here for a follow up appt for LPP. Ricky notes overall decrease in hair loss since last visit.     Candace Solis LPN

## 2019-09-16 NOTE — PROGRESS NOTES
[facility] Dermatology Note      Dermatology Problem List:  1. 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 Mast cell activation syndrome, previously followed with Dr Peralta:  - Negative for c-kit mutation on genetic testing  - Current tx: silica extract and biotin supplements  - Past tx: topical cromolyn, stopped taking early August    Encounter Date: Sep 16, 2019    CC:  Chief Complaint   Patient presents with     Hair/Scalp Problem     Ricky is here for a follow up appt for LPP. Ricky notes overall decrease in hair loss since last visit.         History of Present Illness:  Mr. Ricky Epstein is a 49 year old male who presents as a follow-up for lichen planopilaris and mast cell activation syndrome. The patient was last seen 10/15/2018 when he was continued on plaquenil and finasteride and plaquenil CBC and CMP safety labs were drawn. Since last visit, he reports significant improvement in his scalp symptoms. He reports some mild pain in his scalp on examination today, but denies pruritus or burning. He has stopped applying topical cromolyn to his scalp because he believes this was causing scalp pain and tingling. He is currently still using plaquenil 200 mg daily and  5 mg finasteride daily. He washes his hair daily with Head and Shoulders shampoo. He denies any side effects from these medications, and states that his last eye exam in 11/2017 was normal.     He also reports an area on his left calf that he has had for many years but would like evaluated. He denies any pain or change in size in this area. It does not change with prolonged standing. He does not recall any trauma that occurred in the area that led to this lesion.     Past Medical History:   Patient Active Problem List    Diagnosis     Dermatitis     Onycholysis of toenail     Dysesthesia     Lichen plano-pilaris     Pruritus of scalp     Idiopathic mast cell activation syndrome (H)     No past medical history on file.  No past surgical history on file.    Social History:  Patient reports that he has never smoked. He has never used smokeless tobacco.    Family History:  Family History   Problem Relation Age of Onset     Cancer No family hx of         no skin cancer     Skin Cancer No family hx of      Melanoma No family hx of        Medications:  Current Outpatient Medications   Medication Sig Dispense Refill     Biotin 2500 MCG CAPS Take 15,000 mcg by mouth daily       finasteride (PROSCAR) 5 MG tablet Take 1 tablet (5 mg) by mouth daily 30 tablet 5     hydroxychloroquine (PLAQUENIL) 200 MG tablet Take 1 tablet (200 mg) by mouth daily 60 tablet 5     Pyrithione Zinc (DHS ZINC) 2 % SHAM Externally apply topically daily        UNABLE TO FIND 1 teaspoonful daily MEDICATION NAME: Diatomaceous Earth       cromolyn (GASTROCROM) 100 MG/5ML (HIGH CONC) solution TAKE 10ML (200MG) BY MOUTH 4 TIMES DAILY, BEFORE MEALS AND NIGHTLY (Patient not taking: Reported on 9/16/2019) 1200 mL 0     cromolyn (OPTICROM) 4 % ophthalmic solution Place 1 drop into both eyes 8 times daily (Patient not taking: Reported on 9/16/2019) 3 Bottle 11     Cyanocobalamin (VITAMIN  B-12) 250 MCG TABS Take by mouth daily       Ferrous Sulfate (IRON) 325 (65 FE) MG tablet Take 1 tablet by mouth daily       fexofenadine (ALLEGRA) 60 MG tablet Take 3 tabs in am (180 mg) and 60 mg in mid afternoon (Patient not taking: Reported on 10/15/2018) 120 tablet 5     Niacinamide 500 MG TBCR        UNABLE TO FIND 1 teaspoonful 6 times daily Organic Ground Shawanda       Allergies   Allergen Reactions     Penicillins Rash     Other reaction(s): Edema         Review of Systems:  -Constitutional: Otherwise feeling well today, in usual state of health.  -HEENT: Patient denies nonhealing  oral sores.  -Skin: As above in HPI. No additional skin concerns.    Physical exam:  Vitals: /89   GEN: This is a well developed, well-nourished male in no acute distress, in a pleasant mood.    SKIN: Focused examination of the scalp, face, and leg was performed.  -Irwin skin type: II  -Mild perifollicular scalp noted throughout scalp. LPPAI score of 0.67 today  -Normal density in frontal, occipital, and temporal scalp  -No loss of arm hair, eyelashes, or eyebrows  -1cm well-defined mobile subcutaneous nodule in left anterior lower leg  -No other lesions of concern on areas examined.     Impression/Plan:  1. Lichen planopilaris with androgenetic alopecia - improved symptomatically. Patient subjectively believes his hair loss has improved. Hair compared to photos from last visit demonstrates hair regrowth at temples, part line, and decreased scalp erythema    Plaquenil safety labs of CBC and CMP ordered today. Patient will need to see ophthalmology this winter (last visit 11/2017).    Re-filled plaquenil and finasteride today. Continue with 200 mg daily of plaquenil and 5 mg of finasteride daily.     Continue with Head and Shoulders shampoo for scalp erythema and scale    Patient continues to use silica supplement and biotin for scalp symptoms. Ok to continue for treatment of scalp symptoms. Discussed with patient that proper dosing can be difficult with supplements.    2. Mast cell activation syndrome:    Patient has not followed Dr. Peralta to [place], currently only using biotin and silica supplements for scalp symptoms    3. Subcutaneous nodule:    Discussed with patient potential differential diagnosis of this lesion, including a vascular varicosity, cyst, or lipoma.     Will refer patient to derm surgery for excision of mass    Follow-up in 6 months, earlier for new or changing lesions.     Staff Involved:  Angelina ALLAN, MS4, saw and examined the patient in the presence of   Malik.    Staff Physician:  I was present with the medical student who participated in the service and in the documentation of the note. I have verified the history and personally performed the physical exam and medical decision making. I agree with the assessment and plan of care as documented in the note.       Alicia Gan MD  Professor and Chair  Department of Dermatology  [facility]: Phone: [phone], Fax:[phone]  [facility]: Phone: [phone], Fax: [phone]

## 2019-11-11 ENCOUNTER — OFFICE VISIT (OUTPATIENT)
Dept: DERMATOLOGY | Facility: CLINIC | Age: 49
End: 2019-11-11
Payer: COMMERCIAL

## 2019-11-11 VITALS — HEART RATE: 85 BPM | SYSTOLIC BLOOD PRESSURE: 136 MMHG | DIASTOLIC BLOOD PRESSURE: 80 MMHG

## 2019-11-11 DIAGNOSIS — L72.9 CYST OF SKIN: Primary | ICD-10-CM

## 2019-11-11 ASSESSMENT — PAIN SCALES - GENERAL
PAINLEVEL: NO PAIN (0)
PAINLEVEL: NO PAIN (0)

## 2019-11-11 NOTE — LETTER
11/11/2019       RE: Ricky Epstein  [address] [place] [address] Dr RAMA [place] [address]     Dear Colleague,    Thank you for referring your patient, Ricky Epstein, to the [facility] DERMATOLOGIC SURGERY at [facility]. Please see a copy of my visit note below.    DERMATOLOGY EXCISION PROCEDURE NOTE    Dermatology Surgery Clinic  [facility]  [address] [place] [id number]    NAME OF PROCEDURE: Excision intermediate layered linear closure  Surgeon: Solitario Stephens MD  Fellow: Eric Mendoza MD  PRE-OPERATIVE DIAGNOSIS:  Cyst  POST-OPERATIVE DIAGNOSIS: same   FINAL EXCISION SIZE(DEFECT SIZE): 1.0 cm, with 0 mm margin   FINAL REPAIR LENGTH: 1.5 cm     INDICATIONS: This patient presented with a 1.0 cm cyst of the L anterior lower extremity. Excision was indicated. We discussed the principles of treatment and most likely complications including scarring, bleeding, infection, incomplete excision, wound dehiscence, pain, nerve damage, and recurrence. Informed consent was obtained and the patient underwent the procedure as follows:    PROCEDURE: The patient was taken to the operative suite. Time-out was performed.  The treatment area was anesthetized with 1% lidocaine and epinephrine (1:100,000). The area was prepped with Chlorhexidine and rinsed with sterile saline and draped with sterile towels. The lesion was delineated and excised down to subcutaneous fat in a fusiform manner. Hemostasis was obtained by electrofulguration.     REPAIR: An intermediate layered linear closure was selected as the procedure which would maximally preserve both function and cosmesis.    After the excision of the tumor, the area was carefully undermined. Hemostasis was obtained with electrocoagulation.  Closure was oriented so that the wound was in the patient's natural skin tension lines. The subcutaneous and dermal layers were then closed with 4-0 Monocryl buried  vertical mattress sutures. The epidermis was then carefully approximated along the length of the wound using simple running 5-0 fast absorbing gut sutures.     The final wound length was 1.5 cm. A total of 6.0 ml of anesthesia was administered for all surgical sites. Estimated blood loss was less than 10 ml for all surgical sites. A sterile pressure dressing was applied and wound care instructions, with a written handout, were given. The patient was discharged from the Dermatologic Surgery Center alert and ambulatory.    Follow-up as needed.      Staff Involved:    Scribe Disclosure  I, Barby Moncada, am serving as a scribe to document services personally performed by Dr. Solitario Stephens, based on data collection and the provider's statements to me.       Attending attestation:  I was present for key elements of the procedure and immediately available for all other portions of the procedure.  I have reviewed the note and edited it as necessary.    Solitario Stephens M.D.  Professor  Director of Dermatologic Surgery  Department of Dermatology  [facility]    Dermatology Surgery Clinic  [facility]  [address] [place] [id number]

## 2019-11-11 NOTE — PATIENT INSTRUCTIONS

## 2019-11-11 NOTE — NURSING NOTE
Chief Complaint   Patient presents with     Derm Problem     cyst L lower leg      Miriam Fortune, EMT

## 2019-11-11 NOTE — PROGRESS NOTES
DERMATOLOGY EXCISION PROCEDURE NOTE    Dermatology Surgery Clinic  [facility]  [address] [place] [address]    NAME OF PROCEDURE: Excision intermediate layered linear closure  Surgeon: Solitario Stephens MD  Fellow: Eric Mendoza MD  PRE-OPERATIVE DIAGNOSIS:  Cyst  POST-OPERATIVE DIAGNOSIS: same   FINAL EXCISION SIZE(DEFECT SIZE): 1.0 cm, with 0 mm margin   FINAL REPAIR LENGTH: 1.5 cm     INDICATIONS: This patient presented with a 1.0 cm cyst of the L anterior lower extremity. Excision was indicated. We discussed the principles of treatment and most likely complications including scarring, bleeding, infection, incomplete excision, wound dehiscence, pain, nerve damage, and recurrence. Informed consent was obtained and the patient underwent the procedure as follows:    PROCEDURE: The patient was taken to the operative suite. Time-out was performed.  The treatment area was anesthetized with 1% lidocaine and epinephrine (1:100,000). The area was prepped with Chlorhexidine and rinsed with sterile saline and draped with sterile towels. The lesion was delineated and excised down to subcutaneous fat in a fusiform manner. Hemostasis was obtained by electrofulguration.     REPAIR: An intermediate layered linear closure was selected as the procedure which would maximally preserve both function and cosmesis.    After the excision of the tumor, the area was carefully undermined. Hemostasis was obtained with electrocoagulation.  Closure was oriented so that the wound was in the patient's natural skin tension lines. The subcutaneous and dermal layers were then closed with 4-0 Monocryl buried vertical mattress sutures. The epidermis was then carefully approximated along the length of the wound using simple running 5-0 fast absorbing gut sutures.     The final wound length was 1.5 cm. A total of 6.0 ml of anesthesia was administered for all surgical sites. Estimated blood  loss was less than 10 ml for all surgical sites. A sterile pressure dressing was applied and wound care instructions, with a written handout, were given. The patient was discharged from the Dermatologic Surgery Center alert and ambulatory.    Follow-up as needed.      Staff Involved:    Scribe Disclosure  I, Barby Antwan, am serving as a scribe to document services personally performed by Dr. Solitario Stephens, based on data collection and the provider's statements to me.       Attending attestation:  I was present for key elements of the procedure and immediately available for all other portions of the procedure.  I have reviewed the note and edited it as necessary.    Solitario Stephens M.D.  Professor  Director of Dermatologic Surgery  Department of Dermatology  [facility]    Dermatology Surgery Clinic  [facility]  [address] [place], [place] [id number]

## 2019-11-13 ENCOUNTER — TELEPHONE (OUTPATIENT)
Dept: DERMATOLOGY | Facility: CLINIC | Age: 49
End: 2019-11-13

## 2019-11-13 LAB — COPATH REPORT: NORMAL

## 2019-11-13 NOTE — TELEPHONE ENCOUNTER
Follow up call attempted following procedure with Dr. Stephens, patient did not answer. I left a message stating that I was calling to see how they are doing following the procedure and to call us at [phone] if they have any concerns or questions.     Chiara De La Garza CMA            
never

## 2020-02-25 ENCOUNTER — TELEPHONE (OUTPATIENT)
Dept: DERMATOLOGY | Facility: CLINIC | Age: 50
End: 2020-02-25

## 2020-02-25 NOTE — TELEPHONE ENCOUNTER
I called and left a VM asking Ricky to give us a call back to get scheduled for a follow up. Pt was contacted back in January for a recall but asked to be removed from the list. Nothing is open now for March so h would need to be scheduled at the next available.    Yu Mcgill, JERICHO

## 2020-03-02 ENCOUNTER — HEALTH MAINTENANCE LETTER (OUTPATIENT)
Age: 50
End: 2020-03-02

## 2020-05-12 ENCOUNTER — RECORDS - HEALTHEAST (OUTPATIENT)
Dept: LAB | Facility: CLINIC | Age: 50
End: 2020-05-12

## 2020-05-14 LAB — BACTERIA SPEC CULT: NORMAL

## 2020-10-02 DIAGNOSIS — L66.10 LICHEN PLANOPILARIS: ICD-10-CM

## 2020-10-07 RX ORDER — FINASTERIDE 5 MG/1
TABLET, FILM COATED ORAL
Qty: 30 TABLET | Refills: 0 | OUTPATIENT
Start: 2020-10-07

## 2020-11-23 ENCOUNTER — OFFICE VISIT (OUTPATIENT)
Dept: DERMATOLOGY | Facility: CLINIC | Age: 50
End: 2020-11-23
Payer: COMMERCIAL

## 2020-11-23 DIAGNOSIS — L64.9 ANDROGENETIC ALOPECIA: ICD-10-CM

## 2020-11-23 DIAGNOSIS — L66.10 LICHEN PLANOPILARIS: Primary | ICD-10-CM

## 2020-11-23 DIAGNOSIS — Z51.81 MEDICATION MONITORING ENCOUNTER: ICD-10-CM

## 2020-11-23 DIAGNOSIS — L66.10 LICHEN PLANOPILARIS: ICD-10-CM

## 2020-11-23 PROCEDURE — 84403 ASSAY OF TOTAL TESTOSTERONE: CPT | Mod: 90 | Performed by: PATHOLOGY

## 2020-11-23 PROCEDURE — 36415 COLL VENOUS BLD VENIPUNCTURE: CPT | Performed by: PATHOLOGY

## 2020-11-23 PROCEDURE — 99214 OFFICE O/P EST MOD 30 MIN: CPT | Mod: GC | Performed by: DERMATOLOGY

## 2020-11-23 PROCEDURE — 84270 ASSAY OF SEX HORMONE GLOBUL: CPT | Mod: 90 | Performed by: PATHOLOGY

## 2020-11-23 PROCEDURE — 82627 DEHYDROEPIANDROSTERONE: CPT | Mod: 90 | Performed by: PATHOLOGY

## 2020-11-23 PROCEDURE — 99000 SPECIMEN HANDLING OFFICE-LAB: CPT | Performed by: PATHOLOGY

## 2020-11-23 RX ORDER — FINASTERIDE 5 MG/1
5 TABLET, FILM COATED ORAL DAILY
Qty: 90 TABLET | Refills: 1 | Status: SHIPPED | OUTPATIENT
Start: 2020-11-23 | End: 2021-04-23

## 2020-11-23 RX ORDER — FINASTERIDE 5 MG/1
5 TABLET, FILM COATED ORAL DAILY
Qty: 45 TABLET | Refills: 0 | Status: SHIPPED | OUTPATIENT
Start: 2020-11-23 | End: 2020-11-23

## 2020-11-23 RX ORDER — CLOBETASOL PROPIONATE 0.05 G/100ML
SHAMPOO TOPICAL
Qty: 118 ML | Refills: 11 | Status: SHIPPED | OUTPATIENT
Start: 2020-11-23

## 2020-11-23 ASSESSMENT — PAIN SCALES - GENERAL: PAINLEVEL: NO PAIN (0)

## 2020-11-23 NOTE — PROGRESS NOTES
"[facility] Dermatology Note    Dermatology Problem List:  1. 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 Mast cell activation syndrome, previously followed with Dr Horta:  - Negative for c-kit mutation on genetic testing  - Current tx: silica extract and biotin supplements  - Past tx: topical cromolyn, stopped taking early August    3. L anterior lower extremity cyst, s/p excision 11/23/19    Encounter Date: Nov 23, 2020    CC:   Chief Complaint   Patient presents with     Hair Loss     Ricky is here today for a hair loss follow up      History of Present Illness:  Mr. Ricky Epstein is a 50 year old male who presents as a follow-up patient for hair loss, diagnosed as lichen planopilaris with a component of androgenetic alopecia.     - Last seen in clinic on 9/16/19  - Current tx: hydroxychloroquine 200 mg daily (denies GI distress or vision changes), finasteride 5 mg daily, Head and Shoulder's shampoo daily, biotin supplements 15-20k mcg/day, silica extract  - Would like to cut down or either stop taking plaquenil as he has been on it since 2014, feels \"better\" when he is off of it with reduced scalp irritation  - Would like to trial dutasteride if possible, has heard great things about this from friends  - Last eye exam: Fall 2019, does not have follow-up scheduled   - Last safety labs: CBC, CMP reassuring 9/16/19  - Last colonoscopy 5/2019 reassuring, next one in 10 years   - Scalp pain: no  - Scalp burning: very minor, best it has ever been, resolved with silica extract and biotin   - Scalp itching: no  - Eyebrow or eyelash changes: no  - Nail changes: no  - " Overall course: doing very well  - Last LPPAI score: 0.67    No other concerns today and in general state of health.     Past Medical History:   Patient Active Problem List   Diagnosis     Dermatitis     Onycholysis of toenail     Dysesthesia     Lichen plano-pilaris     Pruritus of scalp     Idiopathic mast cell activation syndrome (H)     No past medical history on file.  No past surgical history on file.    Social History:   reports that he has never smoked. He has never used smokeless tobacco.    Family History:  Family History   Problem Relation Age of Onset     Cancer No family hx of         no skin cancer     Skin Cancer No family hx of      Melanoma No family hx of      Medications:  Current Outpatient Medications   Medication Sig Dispense Refill     Biotin 2500 MCG CAPS Take 15,000 mcg by mouth daily       Cyanocobalamin (VITAMIN  B-12) 250 MCG TABS Take by mouth daily       Ferrous Sulfate (IRON) 325 (65 FE) MG tablet Take 1 tablet by mouth daily       finasteride (PROSCAR) 5 MG tablet Take 1 tablet (5 mg) by mouth daily Please keep 6-29-20 clinic appt for further refills 45 tablet 0     hydroxychloroquine (PLAQUENIL) 200 MG tablet Take 1 tablet (200 mg) by mouth daily 60 tablet 5     Niacinamide 500 MG TBCR        Pyrithione Zinc (DHS ZINC) 2 % SHAM Externally apply topically daily        UNABLE TO FIND 1 teaspoonful 6 times daily Organic Ground Shawanda       UNABLE TO FIND 1 teaspoonful daily MEDICATION NAME: Diatomaceous Earth       cromolyn (GASTROCROM) 100 MG/5ML (HIGH CONC) solution TAKE 10ML (200MG) BY MOUTH 4 TIMES DAILY, BEFORE MEALS AND NIGHTLY (Patient not taking: Reported on 9/16/2019) 1200 mL 0     cromolyn (OPTICROM) 4 % ophthalmic solution Place 1 drop into both eyes 8 times daily (Patient not taking: Reported on 9/16/2019) 3 Bottle 11     fexofenadine (ALLEGRA) 60 MG tablet Take 3 tabs in am (180 mg) and 60 mg in mid afternoon (Patient not taking: Reported on 10/15/2018) 120 tablet 5       Allergies   Allergen Reactions     Penicillins Rash     Other reaction(s): Edema     Review of Systems:  -Constitutional: Otherwise doing well. Denies fevers, chills, unintended weight loss.   -HEENT: Patient denies nonhealing oral sores.  -Skin: As above in HPI. No additional skin concerns.    Physical exam:  Vitals: There were no vitals taken for this visit.  GEN: Well developed, well-nourished, in no acute distress, in a pleasant mood.    SKIN: Examination of the scalp, face, hands showed  Irwin type: II  - Frontal hairline and frontal scalp with perifollicular scale and accentuation   - Mild perifollicular erythema  - Mild scaling of the scalp   - Hair pull test negative  - Eyelashes and eyebrows normal density  - Nails no pitting or dystrophy    Impression/Plan:  1. 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 Hope that he will continue to do well off of it, as has been observed with several other patients. Discussed with patient that we are amenable to transitioning to dutasteride, however would like to have urology clear this prior to making the switch.  Given perifollicular accentuation and erythema, also recommend addition of clobetasol shampoo.  All questions answered and patient was amenable with this plan. Refills provided today.   - LPPAI score today: 1 today, 0.67 one year ago   - start clobex shampoo every other day alternating with head and shoulders shampoo  - stop plaquenil 200 mg daily, trial off to see how patient does and also per patient preference   - continue biotin and silica extracts, patient reports excellent response of his mast cell activation syndrome and scalp symptoms with these   - advised patient to hold biotin 1 week prior to any blood work   - labs: CBC, CMP reassuring 9/16/19, will not repeat today as stopping plaquenil; DHEAS and testosterone today   - recommend meeting with urology to  make sure okay to change from finasteride to dutasteride, referral placed today     Follow-up 4-5 months.     Dr. Gan staffed the patient.    Staff Involved:  Resident(Kesha Lee MD)/Staff(as above)    Patient was seen and examined with the dermatology resident. I agree with the history, review of systems, physical examination, assessments and plan.    Alicia Gan MD  Professor and  Chair  Department of Dermatology  [facility]

## 2020-11-23 NOTE — NURSING NOTE
Dermatology Rooming Note    Ricky Epstein's goals for this visit include:   Chief Complaint   Patient presents with     Hair Loss     Ricky is here today for a hair loss follow up      JERICHO Power

## 2020-11-23 NOTE — LETTER
"11/23/2020       RE: Ricky Epstein  [address] [place] [address] [address] [place] [address]     Dear Colleague,    Thank you for referring your patient, Ricky Epstein, to the [facility] [place] [facility]. Please see a copy of my visit note below.    [facility] Dermatology Note    Dermatology Problem List:  1. 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 Mast cell activation syndrome, previously followed with Dr Horta:  - Negative for c-kit mutation on genetic testing  - Current tx: silica extract and biotin supplements  - Past tx: topical cromolyn, stopped taking early August    3. L anterior lower extremity cyst, s/p excision 11/23/19    Encounter Date: Nov 23, 2020    CC:   Chief Complaint   Patient presents with     Hair Loss     Ricky is here today for a hair loss follow up      History of Present Illness:  Mr. Ricky Epstein is a 50 year old male who presents as a follow-up patient for hair loss, diagnosed as lichen planopilaris with a component of androgenetic alopecia.     - Last seen in clinic on 9/16/19  - Current tx: hydroxychloroquine 200 mg daily (denies GI distress or vision changes), finasteride 5 mg daily, Head and Shoulder's shampoo daily, biotin supplements 15-20k mcg/day, silica extract  - Would like to cut down or either stop taking plaquenil as he has been on it since 2014, feels \"better\" when he is off of it with reduced scalp irritation  - Would like to trial dutasteride if possible, has heard great things about this from friends  - Last eye exam: Fall 2019, does not have follow-up scheduled "   - Last safety labs: CBC, CMP reassuring 9/16/19  - Last colonoscopy 5/2019 reassuring, next one in 10 years   - Scalp pain: no  - Scalp burning: very minor, best it has ever been, resolved with silica extract and biotin   - Scalp itching: no  - Eyebrow or eyelash changes: no  - Nail changes: no  - Overall course: doing very well  - Last LPPAI score: 0.67    No other concerns today and in general state of health.     Past Medical History:   Patient Active Problem List   Diagnosis     Dermatitis     Onycholysis of toenail     Dysesthesia     Lichen plano-pilaris     Pruritus of scalp     Idiopathic mast cell activation syndrome (H)     No past medical history on file.  No past surgical history on file.    Social History:   reports that he has never smoked. He has never used smokeless tobacco.    Family History:  Family History   Problem Relation Age of Onset     Cancer No family hx of         no skin cancer     Skin Cancer No family hx of      Melanoma No family hx of      Medications:  Current Outpatient Medications   Medication Sig Dispense Refill     Biotin 2500 MCG CAPS Take 15,000 mcg by mouth daily       Cyanocobalamin (VITAMIN  B-12) 250 MCG TABS Take by mouth daily       Ferrous Sulfate (IRON) 325 (65 FE) MG tablet Take 1 tablet by mouth daily       finasteride (PROSCAR) 5 MG tablet Take 1 tablet (5 mg) by mouth daily Please keep 6-29-20 clinic appt for further refills 45 tablet 0     hydroxychloroquine (PLAQUENIL) 200 MG tablet Take 1 tablet (200 mg) by mouth daily 60 tablet 5     Niacinamide 500 MG TBCR        Pyrithione Zinc (DHS ZINC) 2 % SHAM Externally apply topically daily        UNABLE TO FIND 1 teaspoonful 6 times daily Organic Ground Shawanda       UNABLE TO FIND 1 teaspoonful daily MEDICATION NAME: Diatomaceous Earth       cromolyn (GASTROCROM) 100 MG/5ML (HIGH CONC) solution TAKE 10ML (200MG) BY MOUTH 4 TIMES DAILY, BEFORE MEALS AND NIGHTLY (Patient not taking: Reported on 9/16/2019) 1200 mL 0      cromolyn (OPTICROM) 4 % ophthalmic solution Place 1 drop into both eyes 8 times daily (Patient not taking: Reported on 9/16/2019) 3 Bottle 11     fexofenadine (ALLEGRA) 60 MG tablet Take 3 tabs in am (180 mg) and 60 mg in mid afternoon (Patient not taking: Reported on 10/15/2018) 120 tablet 5      Allergies   Allergen Reactions     Penicillins Rash     Other reaction(s): Edema     Review of Systems:  -Constitutional: Otherwise doing well. Denies fevers, chills, unintended weight loss.   -HEENT: Patient denies nonhealing oral sores.  -Skin: As above in HPI. No additional skin concerns.    Physical exam:  Vitals: There were no vitals taken for this visit.  GEN: Well developed, well-nourished, in no acute distress, in a pleasant mood.    SKIN: Examination of the scalp, face, hands showed  Irwin type: II  - Frontal hairline and frontal scalp with perifollicular scale and accentuation   - Mild perifollicular erythema  - Mild scaling of the scalp   - Hair pull test negative  - Eyelashes and eyebrows normal density  - Nails no pitting or dystrophy    Impression/Plan:  1. 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 Hope that he will continue to do well off of it, as has been observed with several other patients. Discussed with patient that we are amenable to transitioning to dutasteride, however would like to have urology clear this prior to making the switch.  Given perifollicular accentuation and erythema, also recommend addition of clobetasol shampoo.  All questions answered and patient was amenable with this plan. Refills provided today.   - LPPAI score today: 1 today, 0.67 one year ago   - start clobex shampoo every other day alternating with head and shoulders shampoo  - stop plaquenil 200 mg daily, trial off to see how patient does and also per patient preference   - continue biotin and silica extracts, patient reports  excellent response of his mast cell activation syndrome and scalp symptoms with these   - advised patient to hold biotin 1 week prior to any blood work   - labs: CBC, CMP reassuring 9/16/19, will not repeat today as stopping plaquenil; DHEAS and testosterone today   - recommend meeting with urology to make sure okay to change from finasteride to dutasteride, referral placed today     Follow-up 4-5 months.     Dr. Gan staffed the patient.    Staff Involved:  Resident(Kesha Lee MD)/Staff(as above)    Patient was seen and examined with the dermatology resident. I agree with the history, review of systems, physical examination, assessments and plan.    Alicia Gan MD  Professor and  Chair  Department of Dermatology  [facility]    [facility] Summary (11/23/2020)    Frontal anterior    Midscalp    Vertex    Occipital    Right temporal    Left temporal      Again, thank you for allowing me to participate in the care of your patient.      Sincerely,    Alicia Gna MD

## 2020-11-23 NOTE — PATIENT INSTRUCTIONS
Stop taking biotin 1 week before any blood work.     Alternate clobex shampoo with head & shoulders shampoo. Follow instructions on bottle.     Stop plaquenil (hydroxychloroquine).     Referral to urology placed, would like to have their permission to switch from finasteride to dutasteride.

## 2020-11-24 LAB — DHEA-S SERPL-MCNC: 167 UG/DL (ref 80–560)

## 2020-11-25 LAB
SHBG SERPL-SCNC: 28 NMOL/L (ref 11–80)
TESTOST FREE SERPL-MCNC: 8.54 NG/DL (ref 4.7–24.4)
TESTOST SERPL-MCNC: 385 NG/DL (ref 240–950)

## 2020-12-08 NOTE — PROGRESS NOTES
[facility] Summary (11/23/2020)    Frontal anterior    Midscalp    Vertex    Occipital    Right temporal    Left temporal

## 2020-12-20 ENCOUNTER — HEALTH MAINTENANCE LETTER (OUTPATIENT)
Age: 50
End: 2020-12-20

## 2021-04-23 ENCOUNTER — OFFICE VISIT (OUTPATIENT)
Dept: DERMATOLOGY | Facility: CLINIC | Age: 51
End: 2021-04-23
Payer: COMMERCIAL

## 2021-04-23 DIAGNOSIS — L66.10 LICHEN PLANOPILARIS: ICD-10-CM

## 2021-04-23 DIAGNOSIS — L64.9 ANDROGENETIC ALOPECIA: ICD-10-CM

## 2021-04-23 PROCEDURE — 99214 OFFICE O/P EST MOD 30 MIN: CPT | Performed by: DERMATOLOGY

## 2021-04-23 RX ORDER — FINASTERIDE 5 MG/1
5 TABLET, FILM COATED ORAL DAILY
Qty: 90 TABLET | Refills: 1 | Status: SHIPPED | OUTPATIENT
Start: 2021-04-23 | End: 2023-02-13

## 2021-04-23 ASSESSMENT — PAIN SCALES - GENERAL: PAINLEVEL: NO PAIN (0)

## 2021-04-23 NOTE — PROGRESS NOTES
[facility] Dermatology Note    Dermatology Problem List:  1. 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 Mast cell activation syndrome, previously followed with Dr Horta:  - Negative for c-kit mutation on genetic testing  - Current tx: silica extract and biotin supplements  - Past tx: topical cromolyn, stopped taking early August     3. L anterior lower extremity cyst, s/p excision 11/23/19    Encounter Date: Apr 23, 2021    CC:   Chief Complaint   Patient presents with     RECHECK     pt states he is here for a follow up on hairloss,pt states no changes. pt need a refill on finasteride (PROSCAR) 5 MG tablet       History of Present Illness:  Mr. Ricky Epstein is a 51 year old male who presents as a follow-up patient for hair loss. He feels that his hair loss has been stable since his last visit, when he stopped plaquenil 200 mg daily. He notes no new symptoms, and no new areas of hair loss. He feels that he has had no side effects from his current medications, including finasteride. At his last visit, a switch from finasteride to dutasteride had been discussed. He has not yet talked to PCP or urology to discuss this switch, though he states that he would still be interested in making this medication change in the future. He feels that his past diagnosis of Mast cell activation syndrome is steady with current treatment with silica and biotin supplements. No longer taking an iron supplement. He is interested in a refill for his finasteride prescription while he waits to talk to PCP or urology about dutasteride. He is  "again amenable to [employer] photography today for baseline photos on finasteride but off of hydroxychloroquine.     - Last seen in clinic on 11/23/20  - Current tx: Finasteride 5 mg PO daily, Clobex shampoo occasionally, H&S shampoo daily on non-clobex days, biotin and silica supplements  - Scalp pain: No  - Scalp burning: Mild  - Scalp itching: Mild  - Eyebrow or eyelash changes: No  - Nail changes: No  - Overall course: He states \"about the same\" since his last visit.    No other concerns today and in general state of health.     Past Medical History:   Patient Active Problem List   Diagnosis     Dermatitis     Onycholysis of toenail     Dysesthesia     Lichen plano-pilaris     Pruritus of scalp     Idiopathic mast cell activation syndrome (H)     No past medical history on file.  No past surgical history on file.    Social History:   reports that he has never smoked. He has never used smokeless tobacco.    Family History:  Family History   Problem Relation Age of Onset     Cancer No family hx of         no skin cancer     Skin Cancer No family hx of      Melanoma No family hx of        Medications:  Current Outpatient Medications   Medication Sig Dispense Refill     Biotin 2500 MCG CAPS Take 15,000 mcg by mouth daily       clobetasol propionate (CLOBEX) 0.05 % external shampoo Apply to dry scalp, let sit for 15-20 min, then lather and wash off. Alternate with head & shoulders. 118 mL 11     Cyanocobalamin (VITAMIN  B-12) 250 MCG TABS Take by mouth daily       Ferrous Sulfate (IRON) 325 (65 FE) MG tablet Take 1 tablet by mouth daily       finasteride (PROSCAR) 5 MG tablet Take 1 tablet (5 mg) by mouth daily 90 tablet 1     Niacinamide 500 MG TBCR        Pyrithione Zinc (DHS ZINC) 2 % SHAM Externally apply topically daily        UNABLE TO FIND 1 teaspoonful 6 times daily Organic Ground Shawanda       UNABLE TO FIND 1 teaspoonful daily MEDICATION NAME: Diatomaceous Earth       cromolyn (GASTROCROM) 100 MG/5ML (HIGH " CONC) solution TAKE 10ML (200MG) BY MOUTH 4 TIMES DAILY, BEFORE MEALS AND NIGHTLY (Patient not taking: Reported on 9/16/2019) 1200 mL 0     cromolyn (OPTICROM) 4 % ophthalmic solution Place 1 drop into both eyes 8 times daily (Patient not taking: Reported on 9/16/2019) 3 Bottle 11     fexofenadine (ALLEGRA) 60 MG tablet Take 3 tabs in am (180 mg) and 60 mg in mid afternoon (Patient not taking: Reported on 10/15/2018) 120 tablet 5      Allergies   Allergen Reactions     Penicillins Rash     Other reaction(s): Edema       Review of Systems:  -Constitutional: Otherwise doing well.   -HEENT: Patient denies nonhealing oral sores.  -Skin: As above in HPI. No additional skin concerns.    Physical exam:  Vitals: There were no vitals taken for this visit.  GEN: Well developed, well-nourished, in no acute distress, in a pleasant mood.    SKIN: Focused examination of the hair, scalp, and nails was performed.  Irwin type: II  - Perifollicular erythema  - Perifollicular scaling of the scalp   - No diffuse erythema  - Hair pull test negative  - Eyelashes and eyebrows normal density  - Nails no pitting or dystrophy                    Impression/Plan:  1. 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  - Resume clobetasol 0.05% shampoo to scalp twice weekly, with Head and Shoulders shampoo daily on other days  - Continue finasteride 5 mg PO daily   - Can discuss with PCP or urology for any concerns regarding switch from finasteride to dutasteride  - [employer] photos today, 4/23/21, for baseline on finasteride 5 mg, off hydroxychloroquine.  - Continue biotin and silica extracts, patient reports response of his mast cell activation syndrome and scalp symptoms with these      Follow-up in 4 months, earlier for new or changing lesions.     Dr. Gan staffed the patient.    Staff Involved:  Scribe/Fellow (Jc  Shyla)/Staff(as above)    Staff Physician:  I was present with the medical student who participated in the service and in the documentation of the note. I have verified the history and personally performed the physical exam and medical decision making. I agree with the assessment and plan of care as documented in the note.       Alicia Gan MD  Professor and Chair  Department of Dermatology  [facility]: Phone: [phone], Fax:[phone]  [facility]: Phone: [phone], Fax: [phone]

## 2021-04-23 NOTE — LETTER
4/23/2021      RE: Ricky Epstein  [address] [place] [facility] [address]       [facility] Dermatology Note    Dermatology Problem List:  1. 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 Mast cell activation syndrome, previously followed with Dr Horta:  - Negative for c-kit mutation on genetic testing  - Current tx: silica extract and biotin supplements  - Past tx: topical cromolyn, stopped taking early August     3. L anterior lower extremity cyst, s/p excision 11/23/19    Encounter Date: Apr 23, 2021    CC:   Chief Complaint   Patient presents with     RECHECK     pt states he is here for a follow up on hairloss,pt states no changes. pt need a refill on finasteride (PROSCAR) 5 MG tablet       History of Present Illness:  Mr. Ricky Epstein is a 51 year old male who presents as a follow-up patient for hair loss. He feels that his hair loss has been stable since his last visit, when he stopped plaquenil 200 mg daily. He notes no new symptoms, and no new areas of hair loss. He feels that he has had no side effects from his current medications, including finasteride. At his last visit, a switch from finasteride to dutasteride had been discussed. He has not yet talked to PCP or urology to discuss this switch, though he states that he would still be interested in making this medication change in the future. He feels that his past diagnosis of Mast cell activation syndrome is steady with current treatment with silica and biotin supplements. No longer taking an iron supplement. He is interested in a refill for his finasteride  "prescription while he waits to talk to PCP or urology about dutasteride. He is again amenable to Hair[employer]rix photography today for baseline photos on finasteride but off of hydroxychloroquine.     - Last seen in clinic on 11/23/20  - Current tx: Finasteride 5 mg PO daily, Clobex shampoo occasionally, H&S shampoo daily on non-clobex days, biotin and silica supplements  - Scalp pain: No  - Scalp burning: Mild  - Scalp itching: Mild  - Eyebrow or eyelash changes: No  - Nail changes: No  - Overall course: He states \"about the same\" since his last visit.    No other concerns today and in general state of health.     Past Medical History:   Patient Active Problem List   Diagnosis     Dermatitis     Onycholysis of toenail     Dysesthesia     Lichen plano-pilaris     Pruritus of scalp     Idiopathic mast cell activation syndrome (H)     No past medical history on file.  No past surgical history on file.    Social History:   reports that he has never smoked. He has never used smokeless tobacco.    Family History:  Family History   Problem Relation Age of Onset     Cancer No family hx of         no skin cancer     Skin Cancer No family hx of      Melanoma No family hx of        Medications:  Current Outpatient Medications   Medication Sig Dispense Refill     Biotin 2500 MCG CAPS Take 15,000 mcg by mouth daily       clobetasol propionate (CLOBEX) 0.05 % external shampoo Apply to dry scalp, let sit for 15-20 min, then lather and wash off. Alternate with head & shoulders. 118 mL 11     Cyanocobalamin (VITAMIN  B-12) 250 MCG TABS Take by mouth daily       Ferrous Sulfate (IRON) 325 (65 FE) MG tablet Take 1 tablet by mouth daily       finasteride (PROSCAR) 5 MG tablet Take 1 tablet (5 mg) by mouth daily 90 tablet 1     Niacinamide 500 MG TBCR        Pyrithione Zinc (DHS ZINC) 2 % SHAM Externally apply topically daily        UNABLE TO FIND 1 teaspoonful 6 times daily Organic Ground Shawanda       UNABLE TO FIND 1 teaspoonful daily " MEDICATION NAME: Diatomaceous Earth       cromolyn (GASTROCROM) 100 MG/5ML (HIGH CONC) solution TAKE 10ML (200MG) BY MOUTH 4 TIMES DAILY, BEFORE MEALS AND NIGHTLY (Patient not taking: Reported on 9/16/2019) 1200 mL 0     cromolyn (OPTICROM) 4 % ophthalmic solution Place 1 drop into both eyes 8 times daily (Patient not taking: Reported on 9/16/2019) 3 Bottle 11     fexofenadine (ALLEGRA) 60 MG tablet Take 3 tabs in am (180 mg) and 60 mg in mid afternoon (Patient not taking: Reported on 10/15/2018) 120 tablet 5      Allergies   Allergen Reactions     Penicillins Rash     Other reaction(s): Edema       Review of Systems:  -Constitutional: Otherwise doing well.   -HEENT: Patient denies nonhealing oral sores.  -Skin: As above in HPI. No additional skin concerns.    Physical exam:  Vitals: There were no vitals taken for this visit.  GEN: Well developed, well-nourished, in no acute distress, in a pleasant mood.    SKIN: Focused examination of the hair, scalp, and nails was performed.  Irwin type: II  - Perifollicular erythema  - Perifollicular scaling of the scalp   - No diffuse erythema  - Hair pull test negative  - Eyelashes and eyebrows normal density  - Nails no pitting or dystrophy                    Impression/Plan:  1. 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  - Resume clobetasol 0.05% shampoo to scalp twice weekly, with Head and Shoulders shampoo daily on other days  - Continue finasteride 5 mg PO daily   - Can discuss with PCP or urology for any concerns regarding switch from finasteride to dutasteride  - HairMetrix photos today, 4/23/21, for baseline on finasteride 5 mg, off hydroxychloroquine.  - Continue biotin and silica extracts, patient reports response of his mast cell activation syndrome and scalp symptoms with these      Follow-up in 4 months, earlier for new or changing lesions.       Malik staffed the patient.    Staff Involved:  Scribe/Fellow (Jc Mansfield)/Staff(as above)    Staff Physician:  I was present with the medical student who participated in the service and in the documentation of the note. I have verified the history and personally performed the physical exam and medical decision making. I agree with the assessment and plan of care as documented in the note.       Alicia Gan MD  Professor and Chair  Department of Dermatology  [facility]: Phone: [phone], Fax:[phone]  [facility]: Phone: [phone], Fax: [phone]

## 2021-04-23 NOTE — LETTER
4/23/2021       RE: Ricky Epstein  [address] [place] [address] [place] [facility] [facility] [address]     Dear Colleague,    Thank you for referring your patient, Ricky Epstein, to the [facility] [place] [facility]. Please see a copy of my visit note below.    [facility] Dermatology Note    Dermatology Problem List:  1. 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 Mast cell activation syndrome, previously followed with Dr Horta:  - Negative for c-kit mutation on genetic testing  - Current tx: silica extract and biotin supplements  - Past tx: topical cromolyn, stopped taking early August     3. L anterior lower extremity cyst, s/p excision 11/23/19    Encounter Date: Apr 23, 2021    CC:   Chief Complaint   Patient presents with     RECHECK     pt states he is here for a follow up on hairloss,pt states no changes. pt need a refill on finasteride (PROSCAR) 5 MG tablet       History of Present Illness:  Mr. Ricky Epstein is a 51 year old male who presents as a follow-up patient for hair loss. He feels that his hair loss has been stable since his last visit, when he stopped plaquenil 200 mg daily. He notes no new symptoms, and no new areas of hair loss. He feels that he has had no side effects from his current medications, including finasteride. At his last visit, a switch from finasteride to dutasteride had been discussed. He has not yet talked to PCP or urology to discuss this switch, though he states that he would still be interested in making this medication change in the  "future. He feels that his past diagnosis of Mast cell activation syndrome is steady with current treatment with silica and biotin supplements. No longer taking an iron supplement. He is interested in a refill for his finasteride prescription while he waits to talk to PCP or urology about dutasteride. He is again amenable to [employer] photography today for baseline photos on finasteride but off of hydroxychloroquine.     - Last seen in clinic on 11/23/20  - Current tx: Finasteride 5 mg PO daily, Clobex shampoo occasionally, H&S shampoo daily on non-clobex days, biotin and silica supplements  - Scalp pain: No  - Scalp burning: Mild  - Scalp itching: Mild  - Eyebrow or eyelash changes: No  - Nail changes: No  - Overall course: He states \"about the same\" since his last visit.    No other concerns today and in general state of health.     Past Medical History:   Patient Active Problem List   Diagnosis     Dermatitis     Onycholysis of toenail     Dysesthesia     Lichen plano-pilaris     Pruritus of scalp     Idiopathic mast cell activation syndrome (H)     No past medical history on file.  No past surgical history on file.    Social History:   reports that he has never smoked. He has never used smokeless tobacco.    Family History:  Family History   Problem Relation Age of Onset     Cancer No family hx of         no skin cancer     Skin Cancer No family hx of      Melanoma No family hx of        Medications:  Current Outpatient Medications   Medication Sig Dispense Refill     Biotin 2500 MCG CAPS Take 15,000 mcg by mouth daily       clobetasol propionate (CLOBEX) 0.05 % external shampoo Apply to dry scalp, let sit for 15-20 min, then lather and wash off. Alternate with head & shoulders. 118 mL 11     Cyanocobalamin (VITAMIN  B-12) 250 MCG TABS Take by mouth daily       Ferrous Sulfate (IRON) 325 (65 FE) MG tablet Take 1 tablet by mouth daily       finasteride (PROSCAR) 5 MG tablet Take 1 tablet (5 mg) by mouth daily 90 " tablet 1     Niacinamide 500 MG TBCR        Pyrithione Zinc (DHS ZINC) 2 % SHAM Externally apply topically daily        UNABLE TO FIND 1 teaspoonful 6 times daily Organic Ground Shawanda       UNABLE TO FIND 1 teaspoonful daily MEDICATION NAME: Diatomaceous Earth       cromolyn (GASTROCROM) 100 MG/5ML (HIGH CONC) solution TAKE 10ML (200MG) BY MOUTH 4 TIMES DAILY, BEFORE MEALS AND NIGHTLY (Patient not taking: Reported on 9/16/2019) 1200 mL 0     cromolyn (OPTICROM) 4 % ophthalmic solution Place 1 drop into both eyes 8 times daily (Patient not taking: Reported on 9/16/2019) 3 Bottle 11     fexofenadine (ALLEGRA) 60 MG tablet Take 3 tabs in am (180 mg) and 60 mg in mid afternoon (Patient not taking: Reported on 10/15/2018) 120 tablet 5      Allergies   Allergen Reactions     Penicillins Rash     Other reaction(s): Edema       Review of Systems:  -Constitutional: Otherwise doing well.   -HEENT: Patient denies nonhealing oral sores.  -Skin: As above in HPI. No additional skin concerns.    Physical exam:  Vitals: There were no vitals taken for this visit.  GEN: Well developed, well-nourished, in no acute distress, in a pleasant mood.    SKIN: Focused examination of the hair, scalp, and nails was performed.  Irwin type: II  - Perifollicular erythema  - Perifollicular scaling of the scalp   - No diffuse erythema  - Hair pull test negative  - Eyelashes and eyebrows normal density  - Nails no pitting or dystrophy                    Impression/Plan:  1. 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  - Resume clobetasol 0.05% shampoo to scalp twice weekly, with Head and Shoulders shampoo daily on other days  - Continue finasteride 5 mg PO daily   - Can discuss with PCP or urology for any concerns regarding switch from finasteride to dutasteride  - Hair[facility]rix photos today, 4/23/21, for baseline on finasteride 5 mg,  off hydroxychloroquine.  - Continue biotin and silica extracts, patient reports response of his mast cell activation syndrome and scalp symptoms with these      Follow-up in 4 months, earlier for new or changing lesions.     Dr. Gan staffed the patient.    Staff Involved:  Scribe/Fellow (Jc Mansfield)/Staff(as above)    Staff Physician:  I was present with the medical student who participated in the service and in the documentation of the note. I have verified the history and personally performed the physical exam and medical decision making. I agree with the assessment and plan of care as documented in the note.       Alicia Gan MD  Professor and Chair  Department of Dermatology  [facility]: Phone: [phone], Fax:[phone]  [facility] [place]: Phone: [phone], Fax: [phone]              Again, thank you for allowing me to participate in the care of your patient.      Sincerely,    Alicia Gan MD

## 2021-04-24 ENCOUNTER — HEALTH MAINTENANCE LETTER (OUTPATIENT)
Age: 51
End: 2021-04-24

## 2021-09-20 ENCOUNTER — OFFICE VISIT (OUTPATIENT)
Dept: DERMATOLOGY | Facility: CLINIC | Age: 51
End: 2021-09-20
Payer: COMMERCIAL

## 2021-09-20 DIAGNOSIS — L64.9 ANDROGENETIC ALOPECIA: ICD-10-CM

## 2021-09-20 DIAGNOSIS — L30.9 DERMATITIS: ICD-10-CM

## 2021-09-20 DIAGNOSIS — L66.10 LICHEN PLANOPILARIS: Primary | ICD-10-CM

## 2021-09-20 PROCEDURE — 99214 OFFICE O/P EST MOD 30 MIN: CPT | Mod: GC | Performed by: DERMATOLOGY

## 2021-09-20 RX ORDER — DUTASTERIDE 0.5 MG/1
CAPSULE, LIQUID FILLED ORAL
COMMUNITY
Start: 2021-07-05 | End: 2022-01-22

## 2021-09-20 ASSESSMENT — PAIN SCALES - GENERAL: PAINLEVEL: NO PAIN (0)

## 2021-09-20 NOTE — PROGRESS NOTES
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  - Current tx: dutasteride 0.5 mg daily, Clobex shampoo 2 times weekly, Head&Shoulders shampoo daily on other days, biotin supplements, silica extract  - Past treatment: ILK 10, finasteride, hydroxychloroquine 200 mg daily (stopped 11/23/20)  - DHEAS and testosterone studies 11/23/20, wnl  - Hair[facility]rix 4/23/21, 9/20/2021   # Mast cell activation syndrome, previously followed with Dr Horta:  - Negative for c-kit mutation on genetic testing  - Current tx: silica extract and biotin supplements  - Past tx: topical cromolyn, stopped taking early August  # L anterior lower extremity cyst, s/p excision 11/23/19  ____________________________________________    Assessment & Plan:     # 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 LPPAI 0.67 (last visit was 1.33)  - Continue clobetasol 0.05% shampoo to scalp 2x/week alternating with H&S on other days.  - Continue dutasteride 0.5 mg daily.  - Continue biotin and silica extracts, patient reports response of his mast cell activation syndrome and scalp symptoms with these.  - HairMetrix performed today.    Procedures Performed:   HairMetrix performed:  - Frontal anterior: erythema significantly improved  - Overall: improvement of hair counts    Follow-up: 6 months    Staff and Resident:     Arcelia Diaz MD  Dermatology Resident  [facility]      Scribe Disclosure:  SANJANA, Barby Awad, am serving as a scribe to document services personally performed by Alicia Gan MD based on data collection and the provider's statements to me.     Provider Disclosure:   The documentation recorded by the scribe accurately reflects the services I personally performed and the decisions made by me.    Patient was seen and examined with the dermatology  resident. I agree with the history, review of systems, physical examination, assessments and plan.    Alicia Gan MD  Professor and  Chair  Department of Dermatology  [facility]    ____________________________________________    HPI:  Mr. Ricky Epstein is a 51 year old male who presents as a return patient for follow-up of hair loss, diagnosed as LPP with component of androgenetic alopecia.   - Last seen in-clinic on 4/23/21  - Shedding or thinning, or both: hair loss is improved  - Current tx: clobetasol 0.05% shampoo 2x/week alternating with H&S, dutasteride 0.5 mg, biotin and silica extracts  - If using Rogaine, 1 cannister lasts how long: N/a   No Any new medications, supplements, or products? (please list below)     No Scalp pain   Yes Scalp burning   No Scalp itching    No Eyebrow changes    No Eyelash changes   No Beard changes    No Other body hair changes    No Nail changes    No Additional symptoms? (please list below)     - Overall course: Doing very well. Started dutasteride May 4th and reports significant improvement in hair loss. Denies any associated side effects. Colored hair 3 weeks ago.    Patient is otherwise feeling well, in usual state of health, and has no additional skin concerns today.     ROS: As per HPI    Labs:  None reviewed.    Physical Exam:  Vitals: There were no vitals taken for this visit.  GEN: Well developed, well-nourished, in no acute distress, in a pleasant mood.    SKIN: Focused examination of hair and scalp was performed.  Irwin type: II  - Mild perifollicular erythema  - Mild scaling of the scalp   - Hair pull test negative  - Eyelashes and eyebrows normal density  - Improved from prior photos.   Exam otherwise normal.     Medications:  Current Outpatient Medications   Medication     Biotin 2500 MCG CAPS     clobetasol propionate (CLOBEX) 0.05 % external shampoo     cromolyn (GASTROCROM) 100 MG/5ML (HIGH CONC) solution     cromolyn (OPTICROM) 4 %  ophthalmic solution     Cyanocobalamin (VITAMIN  B-12) 250 MCG TABS     fexofenadine (ALLEGRA) 60 MG tablet     finasteride (PROSCAR) 5 MG tablet     Niacinamide 500 MG TBCR     Pyrithione Zinc (DHS ZINC) 2 % SHAM     UNABLE TO FIND     UNABLE TO FIND     No current facility-administered medications for this visit.      Past Medical History:   Patient Active Problem List   Diagnosis     Dermatitis     Onycholysis of toenail     Dysesthesia     Lichen plano-pilaris     Pruritus of scalp     Idiopathic mast cell activation syndrome (H)     No past medical history on file.    CC Chad Penaloza MD  [address] [address] on close of this encounter.

## 2021-09-20 NOTE — LETTER
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  - Current tx: dutasteride 0.5 mg daily, Clobex shampoo 2 times weekly, Head&Shoulders shampoo daily on other days, biotin supplements, silica extract  - Past treatment: ILK 10, finasteride, hydroxychloroquine 200 mg daily (stopped 11/23/20)  - DHEAS and testosterone studies 11/23/20, wnl  - HairMetrix 4/23/21, 9/20/2021   # Mast cell activation syndrome, previously followed with Dr Horta:  - Negative for c-kit mutation on genetic testing  - Current tx: silica extract and biotin supplements  - Past tx: topical cromolyn, stopped taking early August  # L anterior lower extremity cyst, s/p excision 11/23/19  ____________________________________________    Assessment & Plan:     # 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 LPPAI 0.67 (last visit was 1.33)  - Continue clobetasol 0.05% shampoo to scalp 2x/week alternating with H&S on other days.  - Continue dutasteride 0.5 mg daily.  - Continue biotin and silica extracts, patient reports response of his mast cell activation syndrome and scalp symptoms with these.  - HairMetrix performed today.    Procedures Performed:   HairMetrix performed:  - Frontal anterior: erythema significantly improved  - Overall: improvement of hair counts    Follow-up: 6 months    Staff and Resident:     Arcelia Diaz MD  Dermatology Resident  [facility]      Scribe Disclosure:  I, Barby Awad, am serving as a scribe to document services personally performed by Alicia Gan MD based on data collection and the provider's statements to me.   ____________________________________________    HPI:  Mr. Ricky Epstein is a 51 year old male who presents as a return patient for  follow-up of hair loss, diagnosed as LPP with component of androgenetic alopecia.   - Last seen in-clinic on 4/23/21  - Shedding or thinning, or both: hair loss is improved  - Current tx: clobetasol 0.05% shampoo 2x/week alternating with H&S, dutasteride 0.5 mg, biotin and silica extracts  - If using Rogaine, 1 cannister lasts how long: N/a   No Any new medications, supplements, or products? (please list below)     No Scalp pain   Yes Scalp burning   No Scalp itching    No Eyebrow changes    No Eyelash changes   No Beard changes    No Other body hair changes    No Nail changes    No Additional symptoms? (please list below)     - Overall course: Doing very well. Started dutasteride May 4th and reports significant improvement in hair loss. Denies any associated side effects. Colored hair 3 weeks ago.    Patient is otherwise feeling well, in usual state of health, and has no additional skin concerns today.     ROS: As per HPI    Labs:  None reviewed.    Physical Exam:  Vitals: There were no vitals taken for this visit.  GEN: Well developed, well-nourished, in no acute distress, in a pleasant mood.    SKIN: Focused examination of hair and scalp was performed.  Irwin type: II  - Mild perifollicular erythema  - Mild scaling of the scalp   - Hair pull test negative  - Eyelashes and eyebrows normal density  - Improved from prior photos.   Exam otherwise normal.     Medications:  Current Outpatient Medications   Medication     Biotin 2500 MCG CAPS     clobetasol propionate (CLOBEX) 0.05 % external shampoo     cromolyn (GASTROCROM) 100 MG/5ML (HIGH CONC) solution     cromolyn (OPTICROM) 4 % ophthalmic solution     Cyanocobalamin (VITAMIN  B-12) 250 MCG TABS     fexofenadine (ALLEGRA) 60 MG tablet     finasteride (PROSCAR) 5 MG tablet     Niacinamide 500 MG TBCR     Pyrithione Zinc (DHS ZINC) 2 % SHAM     UNABLE TO FIND     UNABLE TO FIND     No current facility-administered medications for this visit.      Past Medical  History:   Patient Active Problem List   Diagnosis     Dermatitis     Onycholysis of toenail     Dysesthesia     Lichen plano-pilaris     Pruritus of scalp     Idiopathic mast cell activation syndrome (H)     No past medical history on file.    CC Chad Penaloza MD  [address] [address] on close of this encounter.    Hair[facility]rix Summary (9/20/2021)    Frontal anterior    Mid scalp    Vertex    Occipital    Right temporal    Left temporal    Summary:          Alicia Gan MD

## 2021-09-20 NOTE — PROGRESS NOTES
HairMetrix Summary (9/20/2021)    Frontal anterior    Mid scalp    Vertex    Occipital    Right temporal    Left temporal    Summary:

## 2021-09-20 NOTE — LETTER
9/20/2021       RE: Ricky Epstein  [address] [place] [address] [address] [place] [address]     Dear Colleague,    Thank you for referring your patient, Ricky Epstein, to the [facility] DERMATOLOGY CLINIC [place] at [facility]. Please see a copy of my visit note below.    [facility] Dermatology Note  Encounter Date: Sep 20, 2021  Office Visit     Dermatology Problem List:  1. 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 Mast cell activation syndrome, previously followed with Dr Horta:  - Negative for c-kit mutation on genetic testing  - Current tx: silica extract and biotin supplements  - Past tx: topical cromolyn, stopped taking early August     3. L anterior lower extremity cyst, s/p excision 11/23/19  ____________________________________________    Assessment & Plan:     # Lichen planopilaris with a component of androgenetic alopecia- continued perifollicular erythema and scale noted today, LPPAI 1.33 today vs 1 on 11/23/20.  - Resume clobetasol 0.05% shampoo to scalp twice weekly, with Head and Shoulders shampoo daily on other days  - Continue finasteride 5 mg PO daily              - Can discuss with PCP or urology for any concerns regarding switch from finasteride to dutasteride  - HairMetrix photos today, 4/23/21, for baseline on finasteride 5 mg, off hydroxychloroquine.  - Continue biotin and silica extracts, patient reports response of his mast cell activation syndrome and scalp symptoms with these    Procedures Performed:   {kkprocedurenotes:658168}    Follow-up:  {kkfollowup:959584}    {kkstaffinvolved:689006}    ***  ____________________________________________    CC: No chief complaint on file.    HPI:  Mr. Ricky Epstein is a 51 year old male who presents as a return patient for follow-up of hair loss, diagnosed as LPP with component of androgenetic alopecia.   - Last seen in-clinic on 4/23/21  - Shedding or thinning, or both: ***  - Current tx: clobetasol 0.05% shmpoo 2x/week alternating with H&S, finasteride 5 mg PO daily, biotin and silica extracts  - If using Rogaine, 1 cannister lasts how long: ***   - Scalp or hair care habits/products: ***  ***Yes/No Any new medications, supplements, or products? (please list below)     ***Yes/No Scalp pain   ***Yes/No Scalp burning   ***Yes/No Scalp itching    ***Yes/No Eyebrow changes    ***Yes/No Eyelash changes   ***Yes/No Beard changes    ***Yes/No Other body hair changes    ***Yes/No Nail changes    ***Yes/No Additional symptoms? (please list below)     - Overall course: ***  - COVID status: ***yes/no/unknown, ***date(if known)    Patient is otherwise feeling well, in usual state of health, and has no additional skin concerns today.     {kkROSoptional:395990}    Labs:  {kkreviewedlabs:439379} reviewed.    Physical Exam:  Vitals: There were no vitals taken for this visit.  GEN: Well developed, well-nourished, in no acute distress, in a pleasant mood.    SKIN: {kkSkinExam:007739}  - Irwin type: ***  - Jurgen part width of ***  - The layers of hair regrowth layers were noted to be  - *** cm for the first  - *** cm at the second  - *** cm at the third   - *** cm at the fourth   - *** diffuse erythema   - *** perifollicular erythema  - *** perifollicular scale   - *** scaling of the scalp   - *** negative hair pull test   - *** normal eyelash density  - *** normal eyebrow density  - *** no nail pitting or dystrophy   - *** scalp folliculitis/pustules   - In comparison to prior photographs, ***  - {Skin Exam Derm:487691}.   -  No other lesions of concern on areas examined.     ***If FFA:  - R lateral forehead vein: elevated***/neutral***/depressed***  - Midline forehead vein: elevated***/neutral***/depressed***  - L ateral forehead vein: elevated***/neutral***/depressed***  - *** facial papules   - *** measurement of lateral canthus to lateral hairline   - *** measurement nasal bridge to anterior hairline    Medications:  Current Outpatient Medications   Medication     Biotin 2500 MCG CAPS     clobetasol propionate (CLOBEX) 0.05 % external shampoo     cromolyn (GASTROCROM) 100 MG/5ML (HIGH CONC) solution     cromolyn (OPTICROM) 4 % ophthalmic solution     Cyanocobalamin (VITAMIN  B-12) 250 MCG TABS     fexofenadine (ALLEGRA) 60 MG tablet     finasteride (PROSCAR) 5 MG tablet     Niacinamide 500 MG TBCR     Pyrithione Zinc (DHS ZINC) 2 % SHAM     UNABLE TO FIND     UNABLE TO FIND     No current facility-administered medications for this visit.      Past Medical History:   Patient Active Problem List   Diagnosis     Dermatitis     Onycholysis of toenail     Dysesthesia     Lichen plano-pilaris     Pruritus of scalp     Idiopathic mast cell activation syndrome (H)     No past medical history on file.    CC Chad Penaloza MD  [address] [address] on close of this encounter.      Again, thank you for allowing me to participate in the care of your patient.      Sincerely,    Alicia Gan MD

## 2021-09-20 NOTE — NURSING NOTE
Dermatology Rooming Note    Ricky Epstein's goals for this visit include:   Chief Complaint   Patient presents with     Hair Loss     here for hair loss follow up and patient indicates he has some re-growth.     Ashley Allison RN

## 2021-10-03 ENCOUNTER — HEALTH MAINTENANCE LETTER (OUTPATIENT)
Age: 51
End: 2021-10-03

## 2021-12-22 ENCOUNTER — LAB REQUISITION (OUTPATIENT)
Dept: LAB | Facility: CLINIC | Age: 51
End: 2021-12-22
Payer: COMMERCIAL

## 2021-12-22 DIAGNOSIS — Z03.818 ENCOUNTER FOR OBSERVATION FOR SUSPECTED EXPOSURE TO OTHER BIOLOGICAL AGENTS RULED OUT: ICD-10-CM

## 2021-12-22 PROCEDURE — U0003 INFECTIOUS AGENT DETECTION BY NUCLEIC ACID (DNA OR RNA); SEVERE ACUTE RESPIRATORY SYNDROME CORONAVIRUS 2 (SARS-COV-2) (CORONAVIRUS DISEASE [COVID-19]), AMPLIFIED PROBE TECHNIQUE, MAKING USE OF HIGH THROUGHPUT TECHNOLOGIES AS DESCRIBED BY CMS-2020-01-R: HCPCS | Mod: ORL | Performed by: NURSE PRACTITIONER

## 2021-12-23 LAB — SARS-COV-2 RNA RESP QL NAA+PROBE: NEGATIVE

## 2022-01-22 DIAGNOSIS — L66.10 LICHEN PLANOPILARIS: Primary | ICD-10-CM

## 2022-01-22 RX ORDER — DUTASTERIDE 0.5 MG/1
CAPSULE, LIQUID FILLED ORAL
Qty: 90 CAPSULE | Refills: 1 | Status: SHIPPED | OUTPATIENT
Start: 2022-01-22 | End: 2022-04-19

## 2022-01-23 NOTE — TELEPHONE ENCOUNTER
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 LPPAI 0.67 (last visit was 1.33)  - Continue clobetasol 0.05% shampoo to scalp 2x/week alternating with H&S on other days.  - Continue dutasteride 0.5 mg daily.  - Continue biotin and silica extracts, patient reports response of his mast cell activation syndrome and scalp symptoms with these.  - HairMetrix performed today.     Procedures Performed:   HairMetrix performed:  - Frontal anterior: erythema significantly improved  - Overall: improvement of hair counts     Follow-up: 6 months

## 2022-01-23 NOTE — TELEPHONE ENCOUNTER
Refill for dutasteride approved. Last note reviewed and states to continue this medication. Follow up scheduled on 5/27/22.    CC'ing Dr. Gan as RAJ Auguste DO PGY-3  Department of Dermatology  [facility]

## 2022-04-15 DIAGNOSIS — L66.10 LICHEN PLANOPILARIS: ICD-10-CM

## 2022-04-19 RX ORDER — DUTASTERIDE 0.5 MG/1
CAPSULE, LIQUID FILLED ORAL
Qty: 90 CAPSULE | Refills: 0 | Status: SHIPPED | OUTPATIENT
Start: 2022-04-19 | End: 2024-03-29

## 2022-04-19 NOTE — TELEPHONE ENCOUNTER
Dutasteride Oral Capsule 0.5 MG     Last Written Prescription Date:  1/22/2022  Last Fill Quantity: 90,   # refills: 1  Last Office Visit : 9/20/2021  Future Office visit:  5/27/22        Dutasteride Oral Capsule 0.5 MG not on the  [facility] refill protocol or controlled substance    3 month with 1 Rf sent 1/22/22 [facility] [address] [place], [place] - [address] [facility]  New pharmacy requested, remaining refill sent as fulfillment to  to   [id number] - [place], [place] - [address] [facility]

## 2022-05-15 ENCOUNTER — HEALTH MAINTENANCE LETTER (OUTPATIENT)
Age: 52
End: 2022-05-15

## 2022-05-27 ENCOUNTER — OFFICE VISIT (OUTPATIENT)
Dept: DERMATOLOGY | Facility: CLINIC | Age: 52
End: 2022-05-27
Payer: COMMERCIAL

## 2022-05-27 DIAGNOSIS — L30.9 DERMATITIS: Primary | ICD-10-CM

## 2022-05-27 DIAGNOSIS — L64.9 ANDROGENETIC ALOPECIA: ICD-10-CM

## 2022-05-27 PROCEDURE — 99213 OFFICE O/P EST LOW 20 MIN: CPT | Performed by: DERMATOLOGY

## 2022-05-27 RX ORDER — KETOCONAZOLE 20 MG/ML
SHAMPOO TOPICAL
Qty: 120 ML | Refills: 5 | Status: SHIPPED | OUTPATIENT
Start: 2022-05-27

## 2022-05-27 ASSESSMENT — PAIN SCALES - GENERAL: PAINLEVEL: NO PAIN (0)

## 2022-05-27 NOTE — NURSING NOTE
Dermatology Rooming Note    Ricky Epstein's goals for this visit include:   Chief Complaint   Patient presents with     Hair Loss     Ricky is here today for a hair loss follow up - doing good      Yu Mcgill, JERICHO

## 2022-05-27 NOTE — PROGRESS NOTES
[facility] Dermatology Note  Encounter Date: May 27, 2022  Office Visit     Dermatology Problem List:  1. 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 Mast cell activation syndrome, previously followed with Dr Horta:  - Negative for c-kit mutation on genetic testing  - Current tx: silica extract   - Past tx: topical cromolyn, stopped taking early August, collagen peptides, multivitamins, antihistamines, and biotin supplements  3. 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 Scale present on exam today. Doing well overall.  - Continue dutasteride 0.5 mg daily.  - Start ketoconazole shampoo   - Continue silica extracts, patient reports response of his mast cell activation syndrome and scalp symptoms with these.    Procedures Performed:   None    Follow-up: 6 month(s) in-person, or earlier for new or changing lesions    Staff and Scribe:     Scribe Disclosure:  I, Popeye Hough, am serving as a scribe to document services personally performed by Alicia Gan MD based on data collection and the provider's statements to me.     Provider Disclosure:   The documentation recorded by the scribe accurately reflects the services I personally performed and the decisions made by me.    Alicia Gan MD  Professor and Chair  Department of Dermatology  [facility]: Phone: [phone], Fax:[phone]  [facility]  "[facility]: Phone: [phone], Fax: [phone]      ____________________________________________    CC: Hair Loss (Ricky is here today for a hair loss follow up - doing good )    HPI:  Mr. Ricky Epstein is a 52 year old male who presents as a return patient for follow-up of hair loss, diagnosed as Lichen planopilaris with a component of androgenetic alopecia.   - Last seen in-clinic on 9/20/2021  - Shedding or thinning, or both: N/A  - Current tx: dutasteride 0.5 mg daily, Clobex shampoo 2 times weekly, Head&Shoulders shampoo daily on other days, biotin supplements, silica extract  - If using Rogaine, 1 cannister lasts how long: N/A   - Scalp or hair care habits/products: none  No Any new medications, supplements, or products? (please list below)     Yes Scalp pain   Yes Scalp burning   No Scalp itching    No Eyebrow changes    No Eyelash changes   No Beard changes    No Other body hair changes    No Nail changes    No Additional symptoms? (please list below)     - Overall course: The patient reports that his scalp is better however had a \"fallout\" a couple months ago. He was in [place] in February and believes this may have played a part. The patient notes that he discontinued topical steroids and the hematology medications except silica. He also discontinued caffeine use. He is feeling clearer and overall better. Patient colored his hair 4-5 weeks ago.  - COVID status: unknown    Patient is otherwise feeling well, in usual state of health, and has no additional skin concerns today.       ROS: As per HPI    Labs:  None reviewed.    Physical Exam:  Vitals: There were no vitals taken for this visit.  GEN: Well developed, well-nourished, in no acute distress, in a pleasant mood.    SKIN: A focused exam of the scalp, face and hands was examined today.  - Irwin type: ii  - Jurgen part width of 1.1, 1.2, 1.2  - Erickson 2  - The layers of hair regrowth layers were noted to be  - 1-2 robust for the " first  - 3-4 cm at the second  - 6-7 cm at the third  - Mild diffuse erythema   - Mild focal perifollicular erythema  - Mild perifollicular scale   - No scaling of the scalp   - Negative hair pull test   - Normal eyelash density  - Normal eyebrow density  - No nail pitting or dystrophy   - No scalp folliculitis/pustules   - In comparison to prior photographs, improvement of the teleangectasia. Stable on the right temple.  - No other lesions of concern on areas examined.       Medications:  Current Outpatient Medications   Medication     dutasteride (AVODART) 0.5 MG capsule     ketoconazole (NIZORAL) 2 % external shampoo     Pyrithione Zinc 2 % SHAM     UNABLE TO FIND     Biotin 2500 MCG CAPS     clobetasol propionate (CLOBEX) 0.05 % external shampoo     cromolyn (GASTROCROM) 100 MG/5ML (HIGH CONC) solution     cromolyn (OPTICROM) 4 % ophthalmic solution     Cyanocobalamin (VITAMIN  B-12) 250 MCG TABS     fexofenadine (ALLEGRA) 60 MG tablet     finasteride (PROSCAR) 5 MG tablet     Niacinamide 500 MG TBCR     UNABLE TO FIND     No current facility-administered medications for this visit.      Past Medical History:   Patient Active Problem List   Diagnosis     Dermatitis     Onycholysis of toenail     Dysesthesia     Lichen plano-pilaris     Pruritus of scalp     Idiopathic mast cell activation syndrome (H)     History reviewed. No pertinent past medical history.    CC Chad Penaloza MD  [address] [address] on close of this encounter.

## 2022-05-27 NOTE — LETTER
5/27/2022       RE: Ricky Epstein  [address] [place] [address] [address] [place] [address]     Dear Colleague,    Thank you for referring your patient, Ricky Epstein, to the [facility] DERMATOLOGY CLINIC [place] at [facility]. Please see a copy of my visit note below.    [facility] Dermatology Note  Encounter Date: May 27, 2022  Office Visit     Dermatology Problem List:  1. 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 Mast cell activation syndrome, previously followed with Dr Horta:  - Negative for c-kit mutation on genetic testing  - Current tx: silica extract   - Past tx: topical cromolyn, stopped taking early August, collagen peptides, multivitamins, antihistamines, and biotin supplements  3. 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 Scale present on exam today. Doing well overall.  - Continue dutasteride 0.5 mg daily.  - Start ketoconazole shampoo   - Continue silica extracts, patient reports response of his mast cell activation syndrome and scalp symptoms with these.    Procedures Performed:   None    Follow-up: 6 month(s) in-person, or earlier for new or changing lesions    Staff and Scribe:     Scribe Disclosure:  I, Popeye Hough, am serving as a scribe to document services personally performed by Alicia Gan MD based on data collection and the provider's statements to me.     Provider Disclosure:   The documentation recorded by the  "scribe accurately reflects the services I personally performed and the decisions made by me.    Alicia Gan MD  Professor and Chair  Department of Dermatology  [facility]: Phone: [phone], Fax:[phone]  [facility]: Phone: [phone], Fax: [phone]      ____________________________________________    CC: Hair Loss (Ricky is here today for a hair loss follow up - doing good )    HPI:  Mr. Ricky Epstein is a 52 year old male who presents as a return patient for follow-up of hair loss, diagnosed as Lichen planopilaris with a component of androgenetic alopecia.   - Last seen in-clinic on 9/20/2021  - Shedding or thinning, or both: N/A  - Current tx: dutasteride 0.5 mg daily, Clobex shampoo 2 times weekly, Head&Shoulders shampoo daily on other days, biotin supplements, silica extract  - If using Rogaine, 1 cannister lasts how long: N/A   - Scalp or hair care habits/products: none  No Any new medications, supplements, or products? (please list below)     Yes Scalp pain   Yes Scalp burning   No Scalp itching    No Eyebrow changes    No Eyelash changes   No Beard changes    No Other body hair changes    No Nail changes    No Additional symptoms? (please list below)     - Overall course: The patient reports that his scalp is better however had a \"fallout\" a couple months ago. He was in [place] in February and believes this may have played a part. The patient notes that he discontinued topical steroids and the hematology medications except silica. He also discontinued caffeine use. He is feeling clearer and overall better. Patient colored his hair 4-5 weeks ago.  - COVID status: unknown    Patient is otherwise feeling well, in usual state of health, and has no additional skin concerns today.       ROS: As per HPI    Labs:  None reviewed.    Physical Exam:  Vitals: There were no vitals taken for this " visit.  GEN: Well developed, well-nourished, in no acute distress, in a pleasant mood.    SKIN: A focused exam of the scalp, face and hands was examined today.  - Irwin type: ii  - Jurgen part width of 1.1, 1.2, 1.2  - Erickson 2  - The layers of hair regrowth layers were noted to be  - 1-2 robust for the first  - 3-4 cm at the second  - 6-7 cm at the third  - Mild diffuse erythema   - Mild focal perifollicular erythema  - Mild perifollicular scale   - No scaling of the scalp   - Negative hair pull test   - Normal eyelash density  - Normal eyebrow density  - No nail pitting or dystrophy   - No scalp folliculitis/pustules   - In comparison to prior photographs, improvement of the teleangectasia. Stable on the right temple.  - No other lesions of concern on areas examined.       Medications:  Current Outpatient Medications   Medication     dutasteride (AVODART) 0.5 MG capsule     ketoconazole (NIZORAL) 2 % external shampoo     Pyrithione Zinc 2 % SHAM     UNABLE TO FIND     Biotin 2500 MCG CAPS     clobetasol propionate (CLOBEX) 0.05 % external shampoo     cromolyn (GASTROCROM) 100 MG/5ML (HIGH CONC) solution     cromolyn (OPTICROM) 4 % ophthalmic solution     Cyanocobalamin (VITAMIN  B-12) 250 MCG TABS     fexofenadine (ALLEGRA) 60 MG tablet     finasteride (PROSCAR) 5 MG tablet     Niacinamide 500 MG TBCR     UNABLE TO FIND     No current facility-administered medications for this visit.      Past Medical History:   Patient Active Problem List   Diagnosis     Dermatitis     Onycholysis of toenail     Dysesthesia     Lichen plano-pilaris     Pruritus of scalp     Idiopathic mast cell activation syndrome (H)     History reviewed. No pertinent past medical history.    CC Chad Penaloza MD  [id number] [place] [address] on close of this encounter.      Again, thank you for allowing me to participate in the care of your patient.      Sincerely,    Alicia Francis  MD Malik

## 2022-09-10 ENCOUNTER — HEALTH MAINTENANCE LETTER (OUTPATIENT)
Age: 52
End: 2022-09-10

## 2022-11-04 ENCOUNTER — LAB REQUISITION (OUTPATIENT)
Dept: LAB | Facility: CLINIC | Age: 52
End: 2022-11-04

## 2022-11-04 DIAGNOSIS — R53.83 OTHER FATIGUE: ICD-10-CM

## 2022-11-04 DIAGNOSIS — R68.82 DECREASED LIBIDO: ICD-10-CM

## 2022-11-04 DIAGNOSIS — F41.1 GENERALIZED ANXIETY DISORDER: ICD-10-CM

## 2022-11-04 PROCEDURE — 84403 ASSAY OF TOTAL TESTOSTERONE: CPT | Performed by: FAMILY MEDICINE

## 2022-11-04 PROCEDURE — 82306 VITAMIN D 25 HYDROXY: CPT | Performed by: FAMILY MEDICINE

## 2022-11-04 PROCEDURE — 84443 ASSAY THYROID STIM HORMONE: CPT | Performed by: FAMILY MEDICINE

## 2022-11-05 LAB
DEPRECATED CALCIDIOL+CALCIFEROL SERPL-MC: 37 UG/L (ref 20–75)
TSH SERPL DL<=0.005 MIU/L-ACNC: 1.59 UIU/ML (ref 0.3–4.2)

## 2022-11-08 LAB — TESTOST SERPL-MCNC: 334 NG/DL (ref 240–950)

## 2022-12-05 ENCOUNTER — OFFICE VISIT (OUTPATIENT)
Dept: DERMATOLOGY | Facility: CLINIC | Age: 52
End: 2022-12-05
Payer: COMMERCIAL

## 2022-12-05 DIAGNOSIS — L30.9 DERMATITIS: ICD-10-CM

## 2022-12-05 DIAGNOSIS — L66.10 LICHEN PLANO-PILARIS: Primary | ICD-10-CM

## 2022-12-05 DIAGNOSIS — L64.9 ANDROGENETIC ALOPECIA: ICD-10-CM

## 2022-12-05 PROCEDURE — 99213 OFFICE O/P EST LOW 20 MIN: CPT | Mod: GC | Performed by: DERMATOLOGY

## 2022-12-05 RX ORDER — VENLAFAXINE HYDROCHLORIDE 75 MG/1
CAPSULE, EXTENDED RELEASE ORAL
COMMUNITY
Start: 2022-11-17 | End: 2023-04-07

## 2022-12-05 ASSESSMENT — PAIN SCALES - GENERAL: PAINLEVEL: NO PAIN (0)

## 2022-12-05 NOTE — PROGRESS NOTES
[facility] Dermatology Note  Encounter Date: Dec 5, 2022  Office Visit     Dermatology Problem List:  1. 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 Mast cell activation syndrome, previously followed with Dr Horta:  - Negative for c-kit mutation on genetic testing  - Current tx: silica extract   - Past tx: topical cromolyn, stopped taking early August, collagen peptides, multivitamins, antihistamines, and biotin supplements  3. 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 Mild scale present on exam today. Improved from prior.  - Continue dutasteride 0.5 mg daily.  - Continue ketoconazole shampoo alternated with H&S  - Continue silica extracts, patient reports response of his mast cell activation syndrome and scalp symptoms with these  - Future: Consider Doxepin as below. If PCP does not approve, will restart Allegra.     # Anxiety. Patient recently stopped and Effexor and Xanax. Did not tolerate the Effexor. Discussed alternative treatment options, including Doxepin.  - Patient will reach out to his PCP    Procedures Performed:   None    Follow-up: 6 month(s) in-person, or earlier for new or changing lesions    Staff and Resident:      Nellie Tanner  PGY-2 [facility] Internal Medicine  [phone]    Scribe Disclosure:  FAN ALLAN, am serving as a scribe to document services personally performed by Alicia Gan MD based on data collection and the provider's statements to me.     Provider  Disclosure:   The documentation recorded by the scribe accurately reflects the services I personally performed and the decisions made by me.    Patient was seen and examined with the medicine resident. I agree with the history, review of systems, physical examination, assessments and plan.    Alicia Gan MD  Professor and  Chair  Department of Dermatology  [facility]      ____________________________________________    CC: Hair Loss (Ricky is here for a HL follow-up. States condition has improved since last seen.)    HPI:  Mr. Ricky Epstein is a 52 year old male who presents as a return patient for follow-up of hair loss, diagnosed as LPP.   - Last seen in-clinic on 5/27/22  - Shedding or thinning, or both: Pt notices shedding mainly  - Current tx: dutasteride 0.5 mg daily, Head&Shoulders shampoo daily on other days, silica extract, ketoconazole shampoo  -- Uses head and shoulders daily, uses ketoconazole  - If using Rogaine, 1 cannister lasts how long: N/A  - Scalp or hair care habits/products: Aloe Vera without alcohol, liquid hairspray. Shampoos his hair daily.   - Pt reports that he was initially started on Xanax by his PCP     Yes Any new medications, supplements, or products? (please list below)     Yes - minor  Pain before Scalp pain   Yes Scalp burning   No Scalp itching    No Eyebrow changes    No Eyelash changes   No Beard changes    No Other body hair changes    No Nail changes    No Additional symptoms? (please list below)     - Overall course: Pt states that he was doing very well until started Effexor recently which he feels flared his scalp symptoms, gave him more pain/burning  - COVID status: no known infection    Patient is otherwise feeling well, in usual state of health, and has no additional skin concerns today.     ROS: As per HPI    Labs:  TSH and Vitamin D reviewed (11/4/22)    Physical Exam:  Vitals: There were no vitals taken for this visit.  GEN: Well developed,  well-nourished, in no acute distress, in a pleasant mood.    SKIN: Focused examination of the scalp, hands, and face was performed.  - Irwin type: II  - focal patchy pinkness.  - no diffuse erythema   - mild perifollicular erythema  - mild perifollicular scale   - no scaling of the scalp   - negative hair pull test   - normal eyelash density  - normal eyebrow density  - no nail pitting or dystrophy   - no scalp folliculitis/pustules   - In comparison to prior photographs, mild improvement appreciated.   - No other lesions of concern on areas examined.       Medications:  Current Outpatient Medications   Medication     dutasteride (AVODART) 0.5 MG capsule     ketoconazole (NIZORAL) 2 % external shampoo     Pyrithione Zinc 2 % SHAM     Tadalafil (CIALIS PO)     UNABLE TO FIND     venlafaxine (EFFEXOR XR) 75 MG 24 hr capsule     Biotin 2500 MCG CAPS     clobetasol propionate (CLOBEX) 0.05 % external shampoo     cromolyn (GASTROCROM) 100 MG/5ML (HIGH CONC) solution     cromolyn (OPTICROM) 4 % ophthalmic solution     Cyanocobalamin (VITAMIN  B-12) 250 MCG TABS     fexofenadine (ALLEGRA) 60 MG tablet     finasteride (PROSCAR) 5 MG tablet     Niacinamide 500 MG TBCR     UNABLE TO FIND     No current facility-administered medications for this visit.      Past Medical History:   Patient Active Problem List   Diagnosis     Dermatitis     Onycholysis of toenail     Dysesthesia     Lichen plano-pilaris     Pruritus of scalp     Idiopathic mast cell activation syndrome (H)     No past medical history on file.    CC Chad Penaloza MD  [address] [address] on close of this encounter.

## 2022-12-05 NOTE — LETTER
12/5/2022       RE: Ricky Epstein  [address] [place] [address] [address] [place] [address]     Dear Colleague,    Thank you for referring your patient, Ricky Epstein, to the [facility] [place] [facility]. Please see a copy of my visit note below.    [facility] Dermatology Note  Encounter Date: Dec 5, 2022  Office Visit     Dermatology Problem List:  1. 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 Mast cell activation syndrome, previously followed with Dr Horta:  - Negative for c-kit mutation on genetic testing  - Current tx: silica extract   - Past tx: topical cromolyn, stopped taking early August, collagen peptides, multivitamins, antihistamines, and biotin supplements  3. 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 Scale present on exam today. Doing well overall.  - Continue dutasteride 0.5 mg daily.  - Start ketoconazole shampoo   - Continue silica extracts, patient reports response of his mast cell activation syndrome and scalp symptoms with these.    Procedures Performed:   {kkprocedurenotes:776376}    Follow-up: {kkfollowup:693783}    Staff and Scribe:      Scribe Disclosure:  I, FAN CARPENTER, am serving as a scribe to document services personally performed by Alicia aGn MD based on data collection and the provider's statements to me.     ***  ____________________________________________    CC: No chief complaint on  file.    HPI:  Mr. Ricky Epstein is a 52 year old male who presents as a return patient for follow-up of hair loss, diagnosed as LPP.   - Last seen in-clinic on 5/27/22  - Shedding or thinning, or both: ***  - Current tx: dutasteride 0.5 mg daily, Head&Shoulders shampoo daily on other days, silica extract, ketoconazole shampoo  - If using Rogaine, 1 cannister lasts how long: ***   - Scalp or hair care habits/products: ***  ***Yes/No Any new medications, supplements, or products? (please list below)     ***Yes/No Scalp pain   ***Yes/No Scalp burning   ***Yes/No Scalp itching    ***Yes/No Eyebrow changes    ***Yes/No Eyelash changes   ***Yes/No Beard changes    ***Yes/No Other body hair changes    ***Yes/No Nail changes    ***Yes/No Additional symptoms? (please list below)     - Overall course: ***  - COVID status: ***yes/no/unknown, ***date(if known)    Patient is otherwise feeling well, in usual state of health, and has no additional skin concerns today.       {kkROSoptional:309755}    Labs:  {kkreviewedlabs:775751} reviewed.    Physical Exam:  Vitals: There were no vitals taken for this visit.  GEN: Well developed, well-nourished, in no acute distress, in a pleasant mood.    SKIN: {kkSkinExam:867676}  - Irwin type: ***  - Jurgen part width of ***  - The layers of hair regrowth layers were noted to be  - *** cm for the first  - *** cm at the second  - *** cm at the third   - *** cm at the fourth   - *** diffuse erythema   - *** perifollicular erythema  - *** perifollicular scale   - *** scaling of the scalp   - *** negative hair pull test   - *** normal eyelash density  - *** normal eyebrow density  - *** no nail pitting or dystrophy   - *** scalp folliculitis/pustules   - In comparison to prior photographs, ***  - {Skin Exam Derm:051023}.   - No other lesions of concern on areas examined.       Medications:  Current Outpatient Medications   Medication     Biotin 2500 MCG CAPS     clobetasol propionate (CLOBEX)  0.05 % external shampoo     cromolyn (GASTROCROM) 100 MG/5ML (HIGH CONC) solution     cromolyn (OPTICROM) 4 % ophthalmic solution     Cyanocobalamin (VITAMIN  B-12) 250 MCG TABS     dutasteride (AVODART) 0.5 MG capsule     fexofenadine (ALLEGRA) 60 MG tablet     finasteride (PROSCAR) 5 MG tablet     ketoconazole (NIZORAL) 2 % external shampoo     Niacinamide 500 MG TBCR     Pyrithione Zinc 2 % SHAM     UNABLE TO FIND     UNABLE TO FIND     No current facility-administered medications for this visit.      Past Medical History:   Patient Active Problem List   Diagnosis     Dermatitis     Onycholysis of toenail     Dysesthesia     Lichen plano-pilaris     Pruritus of scalp     Idiopathic mast cell activation syndrome (H)     No past medical history on file.    CC Chad Penaloza MD  [address] [address] on close of this encounter.      Again, thank you for allowing me to participate in the care of your patient.      Sincerely,    Alicia Gan MD

## 2022-12-05 NOTE — PATIENT INSTRUCTIONS
-- Talk with your primary care doctor about doxepin or hydroxyzine, which could be a good short-acting anxiety medication for you. Your primary care can also help you find a better long-acting anti-anxiety medication.

## 2022-12-05 NOTE — NURSING NOTE
Dermatology Rooming Note    Ricky Epstein's goals for this visit include:   Chief Complaint   Patient presents with     Hair Loss     Ricky is here for a HL follow-up. States condition has improved since last seen.     Stephon Penn, EMT

## 2023-01-17 ENCOUNTER — MYC MEDICAL ADVICE (OUTPATIENT)
Dept: DERMATOLOGY | Facility: CLINIC | Age: 53
End: 2023-01-17
Payer: COMMERCIAL

## 2023-01-19 ENCOUNTER — HOSPITAL ENCOUNTER (EMERGENCY)
Facility: CLINIC | Age: 53
Discharge: HOME | End: 2023-01-20
Attending: EMERGENCY MEDICINE | Admitting: EMERGENCY MEDICINE
Payer: COMMERCIAL

## 2023-01-19 DIAGNOSIS — I10 HYPERTENSION, UNSPECIFIED TYPE: ICD-10-CM

## 2023-01-19 DIAGNOSIS — R00.2 PALPITATIONS: ICD-10-CM

## 2023-01-19 LAB
ALBUMIN SERPL-MCNC: 4 G/DL (ref 3.5–5)
ALBUMIN UR-MCNC: NEGATIVE MG/DL
ALP SERPL-CCNC: 111 U/L (ref 45–120)
ALT SERPL W P-5'-P-CCNC: 34 U/L (ref 0–45)
ANION GAP SERPL CALCULATED.3IONS-SCNC: 12 MMOL/L (ref 5–18)
APPEARANCE UR: CLEAR
AST SERPL W P-5'-P-CCNC: 22 U/L (ref 0–40)
ATRIAL RATE - MUSE: 104 BPM
BASOPHILS # BLD AUTO: 0 10E3/UL (ref 0–0.2)
BASOPHILS NFR BLD AUTO: 0 %
BILIRUB SERPL-MCNC: 0.2 MG/DL (ref 0–1)
BILIRUB UR QL STRIP: NEGATIVE
BUN SERPL-MCNC: 24 MG/DL (ref 8–22)
CALCIUM SERPL-MCNC: 9.5 MG/DL (ref 8.5–10.5)
CHLORIDE BLD-SCNC: 101 MMOL/L (ref 98–107)
CO2 SERPL-SCNC: 22 MMOL/L (ref 22–31)
COLOR UR AUTO: ABNORMAL
CREAT SERPL-MCNC: 1.19 MG/DL (ref 0.7–1.3)
DIASTOLIC BLOOD PRESSURE - MUSE: NORMAL MMHG
EOSINOPHIL # BLD AUTO: 0.1 10E3/UL (ref 0–0.7)
EOSINOPHIL NFR BLD AUTO: 1 %
ERYTHROCYTE [DISTWIDTH] IN BLOOD BY AUTOMATED COUNT: 13.1 % (ref 10–15)
GFR SERPL CREATININE-BSD FRML MDRD: 73 ML/MIN/1.73M2
GLUCOSE BLD-MCNC: 185 MG/DL (ref 70–125)
GLUCOSE UR STRIP-MCNC: NEGATIVE MG/DL
HCT VFR BLD AUTO: 44.8 % (ref 40–53)
HGB BLD-MCNC: 15.3 G/DL (ref 13.3–17.7)
HGB UR QL STRIP: NEGATIVE
HYALINE CASTS: 1 /LPF
IMM GRANULOCYTES # BLD: 0 10E3/UL
IMM GRANULOCYTES NFR BLD: 0 %
INR PPP: 1.01 (ref 0.85–1.15)
INTERPRETATION ECG - MUSE: NORMAL
KETONES UR STRIP-MCNC: NEGATIVE MG/DL
LEUKOCYTE ESTERASE UR QL STRIP: NEGATIVE
LYMPHOCYTES # BLD AUTO: 1.8 10E3/UL (ref 0.8–5.3)
LYMPHOCYTES NFR BLD AUTO: 23 %
MAGNESIUM SERPL-MCNC: 2 MG/DL (ref 1.8–2.6)
MCH RBC QN AUTO: 30.2 PG (ref 26.5–33)
MCHC RBC AUTO-ENTMCNC: 34.2 G/DL (ref 31.5–36.5)
MCV RBC AUTO: 89 FL (ref 78–100)
MONOCYTES # BLD AUTO: 0.5 10E3/UL (ref 0–1.3)
MONOCYTES NFR BLD AUTO: 7 %
MUCOUS THREADS #/AREA URNS LPF: PRESENT /LPF
NEUTROPHILS # BLD AUTO: 5.4 10E3/UL (ref 1.6–8.3)
NEUTROPHILS NFR BLD AUTO: 69 %
NITRATE UR QL: NEGATIVE
NRBC # BLD AUTO: 0 10E3/UL
NRBC BLD AUTO-RTO: 0 /100
P AXIS - MUSE: 38 DEGREES
PH UR STRIP: 5.5 [PH] (ref 5–7)
PLATELET # BLD AUTO: 252 10E3/UL (ref 150–450)
POTASSIUM BLD-SCNC: 3.5 MMOL/L (ref 3.5–5)
PR INTERVAL - MUSE: 164 MS
PROT SERPL-MCNC: 7 G/DL (ref 6–8)
QRS DURATION - MUSE: 116 MS
QT - MUSE: 370 MS
QTC - MUSE: 486 MS
R AXIS - MUSE: -52 DEGREES
RBC # BLD AUTO: 5.06 10E6/UL (ref 4.4–5.9)
RBC URINE: 0 /HPF
SODIUM SERPL-SCNC: 135 MMOL/L (ref 136–145)
SP GR UR STRIP: 1.02 (ref 1–1.03)
SYSTOLIC BLOOD PRESSURE - MUSE: NORMAL MMHG
T AXIS - MUSE: 54 DEGREES
TROPONIN I SERPL-MCNC: 0.01 NG/ML (ref 0–0.29)
TSH SERPL DL<=0.005 MIU/L-ACNC: 0.44 UIU/ML (ref 0.3–5)
UROBILINOGEN UR STRIP-MCNC: <2 MG/DL
VENTRICULAR RATE- MUSE: 104 BPM
WBC # BLD AUTO: 7.8 10E3/UL (ref 4–11)
WBC URINE: <1 /HPF

## 2023-01-19 PROCEDURE — 85610 PROTHROMBIN TIME: CPT | Performed by: EMERGENCY MEDICINE

## 2023-01-19 PROCEDURE — 81001 URINALYSIS AUTO W/SCOPE: CPT | Performed by: EMERGENCY MEDICINE

## 2023-01-19 PROCEDURE — 96374 THER/PROPH/DIAG INJ IV PUSH: CPT

## 2023-01-19 PROCEDURE — 80053 COMPREHEN METABOLIC PANEL: CPT | Performed by: EMERGENCY MEDICINE

## 2023-01-19 PROCEDURE — 36415 COLL VENOUS BLD VENIPUNCTURE: CPT | Performed by: EMERGENCY MEDICINE

## 2023-01-19 PROCEDURE — 93005 ELECTROCARDIOGRAM TRACING: CPT | Performed by: EMERGENCY MEDICINE

## 2023-01-19 PROCEDURE — 96361 HYDRATE IV INFUSION ADD-ON: CPT

## 2023-01-19 PROCEDURE — 250N000013 HC RX MED GY IP 250 OP 250 PS 637: Performed by: EMERGENCY MEDICINE

## 2023-01-19 PROCEDURE — 83735 ASSAY OF MAGNESIUM: CPT | Performed by: EMERGENCY MEDICINE

## 2023-01-19 PROCEDURE — 84443 ASSAY THYROID STIM HORMONE: CPT | Performed by: EMERGENCY MEDICINE

## 2023-01-19 PROCEDURE — 258N000003 HC RX IP 258 OP 636: Performed by: EMERGENCY MEDICINE

## 2023-01-19 PROCEDURE — 85025 COMPLETE CBC W/AUTO DIFF WBC: CPT | Performed by: EMERGENCY MEDICINE

## 2023-01-19 PROCEDURE — 84484 ASSAY OF TROPONIN QUANT: CPT | Performed by: EMERGENCY MEDICINE

## 2023-01-19 PROCEDURE — 99285 EMERGENCY DEPT VISIT HI MDM: CPT | Mod: 25

## 2023-01-19 PROCEDURE — 250N000009 HC RX 250: Performed by: EMERGENCY MEDICINE

## 2023-01-19 RX ORDER — METOPROLOL TARTRATE 25 MG/1
50 TABLET, FILM COATED ORAL 2 TIMES DAILY
Qty: 20 TABLET | Refills: 0 | Status: SHIPPED | OUTPATIENT
Start: 2023-01-19 | End: 2023-03-24

## 2023-01-19 RX ORDER — METOPROLOL TARTRATE 25 MG/1
50 TABLET, FILM COATED ORAL ONCE
Status: COMPLETED | OUTPATIENT
Start: 2023-01-19 | End: 2023-01-19

## 2023-01-19 RX ORDER — METOPROLOL TARTRATE 1 MG/ML
5 INJECTION, SOLUTION INTRAVENOUS ONCE
Status: COMPLETED | OUTPATIENT
Start: 2023-01-19 | End: 2023-01-20

## 2023-01-19 RX ADMIN — METOPROLOL TARTRATE 50 MG: 25 TABLET, FILM COATED ORAL at 22:16

## 2023-01-19 RX ADMIN — SODIUM CHLORIDE 500 ML: 9 INJECTION, SOLUTION INTRAVENOUS at 22:40

## 2023-01-19 RX ADMIN — METOPROLOL TARTRATE 5 MG: 1 INJECTION, SOLUTION INTRAVENOUS at 23:57

## 2023-01-19 ASSESSMENT — ACTIVITIES OF DAILY LIVING (ADL): ADLS_ACUITY_SCORE: 35

## 2023-01-20 VITALS
SYSTOLIC BLOOD PRESSURE: 161 MMHG | TEMPERATURE: 97.7 F | WEIGHT: 240 LBS | OXYGEN SATURATION: 95 % | DIASTOLIC BLOOD PRESSURE: 92 MMHG | BODY MASS INDEX: 31.13 KG/M2 | RESPIRATION RATE: 16 BRPM | HEART RATE: 58 BPM

## 2023-01-20 LAB
ATRIAL RATE - MUSE: 90 BPM
DIASTOLIC BLOOD PRESSURE - MUSE: 116 MMHG
INTERPRETATION ECG - MUSE: NORMAL
P AXIS - MUSE: 44 DEGREES
PR INTERVAL - MUSE: 168 MS
QRS DURATION - MUSE: 130 MS
QT - MUSE: 402 MS
QTC - MUSE: 491 MS
R AXIS - MUSE: -45 DEGREES
SYSTOLIC BLOOD PRESSURE - MUSE: 187 MMHG
T AXIS - MUSE: 41 DEGREES
VENTRICULAR RATE- MUSE: 90 BPM

## 2023-01-20 NOTE — DISCHARGE INSTRUCTIONS
Discontinue the Allegra and prednisone  Eat a low salt diet  Check your blood pressure twice daily and keep a diary for your doctor  Continue the chlorthalidone as previously prescribed  Call Dr. Hermosillo at [phone] if you have questions through 5a

## 2023-01-20 NOTE — ED TRIAGE NOTES
Pt arrives to ED via EMS with c/o rapid heart rate and hypertension. EMS got blood pressures around 208/130 and heart rate was 120. Blood pressure in triage 202/117 with heart rate of 104. Pt was given 325 of Asprin from ems. Endorses to 1/10 chest pain. Pt was seen in clinic yesterday for hypertension and started on Chlorthalidone.      Triage Assessment     Row Name 01/19/23 2047       Triage Assessment (Adult)    Airway WDL WDL       Respiratory WDL    Respiratory WDL WDL       Skin Circulation/Temperature WDL    Skin Circulation/Temperature WDL WDL       Cardiac WDL    Cardiac WDL X;chest pain;rhythm    Pulse Rate & Regularity tachycardic       Chest Pain Assessment    Chest Pain Location midsternal       Peripheral/Neurovascular WDL    Peripheral Neurovascular WDL WDL       Cognitive/Neuro/Behavioral WDL    Cognitive/Neuro/Behavioral WDL WDL

## 2023-01-20 NOTE — ED PROVIDER NOTES
EMERGENCY DEPARTMENT ENCOUnter      NAME: Ricky Epstein  AGE: 52 year old male  YOB: 1970  MRN: [id number]  EVALUATION DATE & TIME: 1/19/2023  9:20 PM    PCP: Chad Penaloza    ED PROVIDER: Vika Espinoza MD      Chief Complaint   Patient presents with     Tachycardia     Hypertension         FINAL IMPRESSION:  1. Hypertension, unspecified type    2. Palpitations          ED COURSE & MEDICAL DECISION MAKING:      In summary, the patient is a 52-year-old male that presents to the emergency department for evaluation of high blood pressure and palpitations.  He has no evidence of arrhythmia.  His blood pressure is elevated, but he was just started on antihypertensive yesterday by his primary care.  Patient was also prescribed prednisone and Flonase for his congestion.  The prednisone may be playing a role in his elevated blood pressure.  We will discontinue the prednisone.  He also took Allegra today which may have contributed to his palpitations.  We will also discontinue this medication.  I will prescribe a few day course of Metroprolol and recommend that he consult with his primary care provider for further management of his blood pressure.    2140-evaluated patient.  Lopressor 50 mg p.o. was administered for blood pressure control.  2355-lopressor 5mg IV was administered for additional bp control.  Discussed lab findings and plan for discharge with patient.    Medical Decision Making    History:    Supplemental history from: Documented in chart, if applicable    External Record(s) reviewed: Documented in chart, if applicable.    Work Up:    Chart documentation includes differential considered and any EKGs or imaging independently interpreted by provider, where specified.    In additional to work up documented, I considered the following work up: Documented in chart, if applicable.    External consultation:    Discussion of management with another provider: Documented in chart, if  applicable    Complicating factors:    Care impacted by chronic illness: N/A    Care affected by social determinants of health: N/A    Disposition considerations: Discharge. I prescribed additional prescription strength medication(s) as charted. N/A.            At the conclusion of the encounter I discussed the results of all of the tests and the disposition. The questions were answered. The patient or family acknowledged understanding and was agreeable with the care plan.         MEDICATIONS GIVEN IN THE EMERGENCY:  Medications   metoprolol tartrate (LOPRESSOR) tablet 50 mg (50 mg Oral Given 1/19/23 2216)   0.9% sodium chloride BOLUS (0 mLs Intravenous Stopped 1/19/23 1959)   metoprolol (LOPRESSOR) injection 5 mg (5 mg Intravenous Given 1/19/23 8983)       NEW PRESCRIPTIONS STARTED AT TODAY'S ER VISIT  Discharge Medication List as of 1/20/2023 12:10 AM      START taking these medications    Details   metoprolol tartrate (LOPRESSOR) 25 MG tablet Take 2 tablets (50 mg) by mouth 2 times daily for 5 days, Disp-20 tablet, R-0, Local Print                =================================================================    HPI        Ricky Epstein is a 52 year old male with a pertinent history of hypertension presents to the emergency department for evaluation of palpitations and high blood pressure.  Patient states he was sleeping and awoke and saw on his watch that his heart rate was 125.  He denies any chest pain.  He states he does have bilateral arm discomfort.  He denies any shortness of breath nausea or diaphoresis.  EMS was activated and noted that the patient's blood pressure was 208/130 with a heart rate of 120.  EMS administered aspirin 325 mg.  Patient was seen in clinic yesterday and diagnosed with hypertension and started on chlorthalidone.  He states that he feels like his symptoms are improving in the emergency department.      REVIEW OF SYSTEMS     Constitutional:  Denies fever or chills  HENT:  Denies  sore throat   Respiratory:  Denies cough or shortness of breath   Cardiovascular:  Denies chest pain or palpitations  GI:  Denies abdominal pain, nausea, or vomiting  Musculoskeletal:  Denies any new extremity pain   Skin:  Denies rash   Neurologic:  Denies headache, focal weakness or sensory changes    All other systems reviewed and are negative      PAST MEDICAL HISTORY:  hypertension        CURRENT MEDICATIONS:    metoprolol tartrate (LOPRESSOR) 25 MG tablet  Biotin 2500 MCG CAPS  clobetasol propionate (CLOBEX) 0.05 % external shampoo  cromolyn (GASTROCROM) 100 MG/5ML (HIGH CONC) solution  cromolyn (OPTICROM) 4 % ophthalmic solution  Cyanocobalamin (VITAMIN  B-12) 250 MCG TABS  dutasteride (AVODART) 0.5 MG capsule  fexofenadine (ALLEGRA) 60 MG tablet  finasteride (PROSCAR) 5 MG tablet  ketoconazole (NIZORAL) 2 % external shampoo  Niacinamide 500 MG TBCR  Pyrithione Zinc 2 % SHAM  Tadalafil (CIALIS PO)  UNABLE TO FIND  UNABLE TO FIND  venlafaxine (EFFEXOR XR) 75 MG 24 hr capsule        ALLERGIES:  Allergies   Allergen Reactions     Penicillins Rash     Other reaction(s): Edema       FAMILY HISTORY:  Family History   Problem Relation Age of Onset     Cancer No family hx of         no skin cancer     Skin Cancer No family hx of      Melanoma No family hx of        SOCIAL HISTORY:   Social History     Socioeconomic History     Marital status: Single   Tobacco Use     Smoking status: Never     Smokeless tobacco: Never       VITALS:  Patient Vitals for the past 24 hrs:   BP Temp Temp src Pulse Resp SpO2 Weight   01/20/23 0020 (!) 161/92 -- -- 58 16 95 % --   01/20/23 0015 (!) 161/92 -- -- 58 -- 95 % --   01/20/23 0000 (!) 167/105 -- -- 60 14 95 % --   01/19/23 2344 -- -- -- 62 14 95 % --   01/19/23 2330 (!) 184/111 -- -- 73 (!) 6 -- --   01/19/23 2300 (!) 175/111 -- -- 73 18 97 % --   01/19/23 2245 (!) 170/104 -- -- -- -- -- --   01/19/23 2244 -- -- -- 75 16 96 % --   01/19/23 2820 (!) 194/115 -- -- 78 20 96 % --    01/19/23 2215 (!) 187/115 -- -- 80 18 97 % --   01/19/23 2200 (!) 185/115 -- -- 82 16 95 % --   01/19/23 2145 (!) 166/110 -- -- 87 15 94 % --   01/19/23 2130 (!) 187/116 -- -- 89 16 94 % --   01/19/23 2124 (!) 182/113 -- -- 90 11 95 % --   01/19/23 2050 (!) 202/117 97.7  F (36.5  C) Temporal 104 18 96 % 108.9 kg (240 lb)       PHYSICAL EXAM    Constitutional:  Well developed, Well nourished,  HENT:  Normocephalic, Atraumatic, Bilateral external ears normal, Oropharynx moist, Nose normal.   Neck:  Normal range of motion, No meningismus, No stridor.   Eyes:  EOMI, Conjunctiva normal, No discharge.   Respiratory:  Normal breath sounds, No respiratory distress, No wheezing, No chest tenderness.   Cardiovascular:  Normal heart rate, Normal rhythm, No murmurs  GI:  Soft, No tenderness, No guarding,    Musculoskeletal:  Neurovascularly intact distally, No edema, No tenderness, No cyanosis, Good range of motion in all major joints.   Integument:  Warm, Dry, No erythema, No rash.   Lymphatic:  No lymphadenopathy noted.   Neurologic:  Alert & oriented x 3, Normal motor function,  No focal deficits noted.   Psychiatric:  Affect normal, Judgment normal, Mood normal.      LAB:  All pertinent labs reviewed and interpreted.  Results for orders placed or performed during the hospital encounter of 01/19/23   Result Value Ref Range    INR 1.01 0.85 - 1.15   Comprehensive metabolic panel   Result Value Ref Range    Sodium 135 (L) 136 - 145 mmol/L    Potassium 3.5 3.5 - 5.0 mmol/L    Chloride 101 98 - 107 mmol/L    Carbon Dioxide (CO2) 22 22 - 31 mmol/L    Anion Gap 12 5 - 18 mmol/L    Urea Nitrogen 24 (H) 8 - 22 mg/dL    Creatinine 1.19 0.70 - 1.30 mg/dL    Calcium 9.5 8.5 - 10.5 mg/dL    Glucose 185 (H) 70 - 125 mg/dL    Alkaline Phosphatase 111 45 - 120 U/L    AST 22 0 - 40 U/L    ALT 34 0 - 45 U/L    Protein Total 7.0 6.0 - 8.0 g/dL    Albumin 4.0 3.5 - 5.0 g/dL    Bilirubin Total 0.2 0.0 - 1.0 mg/dL    GFR Estimate 73 >60  mL/min/1.73m2   Result Value Ref Range    Troponin I 0.01 0.00 - 0.29 ng/mL   UA with Microscopic reflex to Culture    Specimen: Urine, Clean Catch   Result Value Ref Range    Color Urine Light Yellow Colorless, Straw, Light Yellow, Yellow    Appearance Urine Clear Clear    Glucose Urine Negative Negative mg/dL    Bilirubin Urine Negative Negative    Ketones Urine Negative Negative mg/dL    Specific Gravity Urine 1.016 1.001 - 1.030    Blood Urine Negative Negative    pH Urine 5.5 5.0 - 7.0    Protein Albumin Urine Negative Negative mg/dL    Urobilinogen Urine <2.0 <2.0 mg/dL    Nitrite Urine Negative Negative    Leukocyte Esterase Urine Negative Negative    Mucus Urine Present (A) None Seen /LPF    RBC Urine 0 <=2 /HPF    WBC Urine <1 <=5 /HPF    Hyaline Casts Urine 1 <=2 /LPF   TSH with free T4 reflex   Result Value Ref Range    TSH 0.44 0.30 - 5.00 uIU/mL   CBC with platelets and differential   Result Value Ref Range    WBC Count 7.8 4.0 - 11.0 10e3/uL    RBC Count 5.06 4.40 - 5.90 10e6/uL    Hemoglobin 15.3 13.3 - 17.7 g/dL    Hematocrit 44.8 40.0 - 53.0 %    MCV 89 78 - 100 fL    MCH 30.2 26.5 - 33.0 pg    MCHC 34.2 31.5 - 36.5 g/dL    RDW 13.1 10.0 - 15.0 %    Platelet Count 252 150 - 450 10e3/uL    % Neutrophils 69 %    % Lymphocytes 23 %    % Monocytes 7 %    % Eosinophils 1 %    % Basophils 0 %    % Immature Granulocytes 0 %    NRBCs per 100 WBC 0 <1 /100    Absolute Neutrophils 5.4 1.6 - 8.3 10e3/uL    Absolute Lymphocytes 1.8 0.8 - 5.3 10e3/uL    Absolute Monocytes 0.5 0.0 - 1.3 10e3/uL    Absolute Eosinophils 0.1 0.0 - 0.7 10e3/uL    Absolute Basophils 0.0 0.0 - 0.2 10e3/uL    Absolute Immature Granulocytes 0.0 <=0.4 10e3/uL    Absolute NRBCs 0.0 10e3/uL   Result Value Ref Range    Magnesium 2.0 1.8 - 2.6 mg/dL   ECG 12-LEAD WITH MUSE (LHE)   Result Value Ref Range    Systolic Blood Pressure  mmHg    Diastolic Blood Pressure  mmHg    Ventricular Rate 104 BPM    Atrial Rate 104 BPM    SC Interval 164 ms     QRS Duration 116 ms     ms    QTc 486 ms    P Axis 38 degrees    R AXIS -52 degrees    T Axis 54 degrees    Interpretation ECG       Sinus tachycardia  Possible Left atrial enlargement  Left anterior fascicular block  Left ventricular hypertrophy with QRS widening  Cannot rule out Septal infarct , age undetermined  Abnormal ECG  No previous ECGs available  Confirmed by SEE ED PROVIDER NOTE FOR, ECG INTERPRETATION (4000),  Luis Cameron ([id number]) on 2023 9:39:30 PM         EK-rate is 90, sinus, there is no ST segment elevation or depression appreciated.  Left axis deviation, LVH with QRS widening EKG is unchanged from 2023  I have independently reviewed and interpreted this EKG          Vika Espinoza MD  Emergency Medicine  [facility] EMERGENCY ROOM  [id number] [place] [address] [phone]  Dept: [phone]       Vika Espinoza MD  [phone]

## 2023-01-23 RX ORDER — FINASTERIDE 5 MG/1
5 TABLET, FILM COATED ORAL DAILY
Qty: 180 TABLET | Refills: 0 | Status: CANCELLED | OUTPATIENT
Start: 2023-01-23

## 2023-01-23 NOTE — TELEPHONE ENCOUNTER
Alicia Gan MD  You; Yu Mcigll, SUNNY; Radha Segal LPN 16 hours ago (8:24 PM)     OLEGARIO CHÁVEZ,   Please call Ricky and ask him if he still wants to pursue this. Please let him know I was notified of his ER visit and how some medications had been changed.     If he still wants to pursue, please confirm, he would like the tabs sent to him and he will prepare the topical solution? It's not clear to me from the messages on this step.     Questions, let me know.   Regards,   OLEGARIO

## 2023-02-02 ENCOUNTER — TRANSFERRED RECORDS (OUTPATIENT)
Dept: HEALTH INFORMATION MANAGEMENT | Facility: CLINIC | Age: 53
End: 2023-02-02
Payer: COMMERCIAL

## 2023-02-02 ENCOUNTER — MYC MEDICAL ADVICE (OUTPATIENT)
Dept: DERMATOLOGY | Facility: CLINIC | Age: 53
End: 2023-02-02
Payer: COMMERCIAL

## 2023-02-02 ENCOUNTER — MEDICAL CORRESPONDENCE (OUTPATIENT)
Dept: HEALTH INFORMATION MANAGEMENT | Facility: CLINIC | Age: 53
End: 2023-02-02
Payer: COMMERCIAL

## 2023-02-03 NOTE — TELEPHONE ENCOUNTER
finasteride (PROSCAR) 5 MG tablet 90 tablet 1 4/23/2021  No   Sig - Route: Take 1 tablet (5 mg) by mouth daily - Oral     Associated Diagnoses    Lichen planopilaris [L66.1]       Androgenetic alopecia [L64.9]         Last office visit: 12/05/2022

## 2023-02-13 DIAGNOSIS — L66.10 LICHEN PLANOPILARIS: ICD-10-CM

## 2023-02-13 DIAGNOSIS — L64.9 ANDROGENETIC ALOPECIA: ICD-10-CM

## 2023-02-13 RX ORDER — FINASTERIDE 5 MG/1
TABLET, FILM COATED ORAL
Qty: 90 TABLET | Refills: 3 | Status: SHIPPED | OUTPATIENT
Start: 2023-02-13 | End: 2023-09-21

## 2023-02-14 NOTE — TELEPHONE ENCOUNTER
Alicia Gan MD  You Yesterday (6:32 AM)       Please let Ricky know the refill has been sent. Please also check with him why we have had in our chart from 2021 that he was not taking this medication. Can we clarify how long he was off this medication and when he resumed taking it. All for chart clarification. Look forward to the update.         Writer spoke with patient. Patient states he was off the oral finasteride for one year. He now wants to try something different by crushing the oral finasteride and mixing it with Rogaine to apply topically.    Radha Segal LPN

## 2023-02-17 ENCOUNTER — OFFICE VISIT (OUTPATIENT)
Dept: CARDIOLOGY | Facility: CLINIC | Age: 53
End: 2023-02-17
Payer: COMMERCIAL

## 2023-02-17 VITALS
HEART RATE: 82 BPM | OXYGEN SATURATION: 97 % | DIASTOLIC BLOOD PRESSURE: 86 MMHG | BODY MASS INDEX: 32.3 KG/M2 | SYSTOLIC BLOOD PRESSURE: 132 MMHG | RESPIRATION RATE: 20 BRPM | WEIGHT: 249 LBS

## 2023-02-17 DIAGNOSIS — R73.09 ELEVATED GLUCOSE: ICD-10-CM

## 2023-02-17 DIAGNOSIS — I10 BENIGN ESSENTIAL HYPERTENSION: ICD-10-CM

## 2023-02-17 DIAGNOSIS — D89.42 IDIOPATHIC MAST CELL ACTIVATION SYNDROME (H): Primary | Chronic | ICD-10-CM

## 2023-02-17 DIAGNOSIS — R94.31 NONSPECIFIC ABNORMAL ELECTROCARDIOGRAM (ECG) (EKG): ICD-10-CM

## 2023-02-17 DIAGNOSIS — R07.2 PRECORDIAL PAIN: ICD-10-CM

## 2023-02-17 LAB
ANION GAP SERPL CALCULATED.3IONS-SCNC: 12 MMOL/L (ref 5–18)
BUN SERPL-MCNC: 22 MG/DL (ref 8–22)
CALCIUM SERPL-MCNC: 9.5 MG/DL (ref 8.5–10.5)
CHLORIDE BLD-SCNC: 98 MMOL/L (ref 98–107)
CO2 SERPL-SCNC: 23 MMOL/L (ref 22–31)
CREAT SERPL-MCNC: 1.14 MG/DL (ref 0.7–1.3)
GFR SERPL CREATININE-BSD FRML MDRD: 76 ML/MIN/1.73M2
GLUCOSE BLD-MCNC: 123 MG/DL (ref 70–125)
POTASSIUM BLD-SCNC: 3.4 MMOL/L (ref 3.5–5)
SODIUM SERPL-SCNC: 133 MMOL/L (ref 136–145)

## 2023-02-17 PROCEDURE — 36415 COLL VENOUS BLD VENIPUNCTURE: CPT | Performed by: INTERNAL MEDICINE

## 2023-02-17 PROCEDURE — 99204 OFFICE O/P NEW MOD 45 MIN: CPT | Performed by: INTERNAL MEDICINE

## 2023-02-17 PROCEDURE — 80048 BASIC METABOLIC PNL TOTAL CA: CPT | Performed by: INTERNAL MEDICINE

## 2023-02-17 RX ORDER — CHLORTHALIDONE 25 MG/1
TABLET ORAL
COMMUNITY
Start: 2023-01-18 | End: 2023-03-24

## 2023-02-17 NOTE — PROGRESS NOTES
[facility]  [phone]      Assessment/Recommendations   Patient with recent documentation of significant hypertension, and started on medical therapy.  Episode of lightheadedness, some shortness of breath and tachycardia evaluated in the emergency room.  EKG is not normal with poor R wave progression but no acute ST-T wave changes.  Blood pressure is improved now and at goal today on chlorthalidone and beta-blocker.    He has some chest discomfort which is somewhat atypical and that it is not necessarily provoked with physical activity.  He also has tachycardia but is on a beta-blocker.    Would recommend that he check a hemoglobin A1c and electrolytes today.  Blood sugar was elevated on earlier test at 180.  He was taking prednisone at that time.    Would also do a stress echocardiogram because of the chest discomfort and this will allow us to look at the left ventricle including for left ventricular hypertrophy and any structural abnormalities.  Patient will continue to take his blood pressure and will report back we will call him with the test results.    Would have a low threshold for a sleep evaluation.    Talked about reduce sodium in the diet and reduce carbohydrates.    Thank you for allowing us to participate in his care.       History of Present Illness/Subjective    Mr. Ricky Epstein is a 52 year old male with known recent documentation of hypertension.  He has noticed his heart rate was high and earlier this year was drinking three 5-hour energy drinks a day.  He quit this in June but his heart rate just did not seem to come down.  While he was resting in bed sometimes his heart rate would be in the 90s and if he worked out it would stay over 100 for a prolonged period of time.  In December he started to notice that his blood pressure was elevated.  He came back from a trip to [place] and was started on antihypertensive medications in the form of chlorthalidone and later started  on beta-blocker in the form of metoprolol.  He was evaluated in the emergency room for rapid heartbeat, some discomfort in his chest and lightheaded feeling without specific findings.  EKG showed poor R wave progression and tachycardia but was otherwise unremarkable.  His labs were unremarkable including a normal troponin but his blood sugar was 180 although he was taking prednisone for an ear problem at that time.  He has noticed that his blood pressure is improved.  He continues to workout on a treadmill for 30 minutes without any difficulties and did so this morning without any chest discomfort with but some mild increase shortness of breath which was more than he is typically accustomed to.    Patient has no history of rheumatic fever, heart murmur, cerebrovascular accident or TIA and is never had a syncopal episode.    He grew up on the side of [place].  He is not  but has a significant other.  He does not have children.  He works as a .  He enjoys his work.    He denies orthopnea, paroxysmal nocturnal dyspnea but does snore.  He is unaware that he has sleep apnea.    ECG: Personally reviewed.  See above       Physical Examination Review of Systems   /86 (BP Location: Right arm, Patient Position: Sitting, Cuff Size: Adult Large)   Pulse 82   Resp 20   Wt 112.9 kg (249 lb)   SpO2 97%   BMI 32.30 kg/m    Body mass index is 32.3 kg/m .  Wt Readings from Last 3 Encounters:   02/17/23 112.9 kg (249 lb)   01/19/23 108.9 kg (240 lb)   07/24/17 108.5 kg (239 lb 4.8 oz)     General Appearance:   Alert, cooperative and in no acute distress.   ENT/Mouth: Patient wearing a mask.      EYES:  no scleral icterus, normal conjunctivae   Neck: JVP normal. No Hepatojugular reflux. Thyroid not visualized.   Chest/Lungs:   Lungs are clear to auscultation, equal chest wall expansion.   Cardiovascular:   S1, S2 without murmur ,clicks or rubs. Brachial, radial and posterior tibial pulses are intact and  symetric. No carotid bruits noted   Abdomen:  Nontender. BS+. No bruits.  Rounded   Extremities: No cyanosis, clubbing or edema   Skin: no xanthelasma, warm.    Neurologic: normal arm movement bilateral, no tremors     Psychiatric: Appropriate affect.      Enc Vitals  BP: 132/86  Pulse: 82  Resp: 20  SpO2: 97 %  Weight: 112.9 kg (249 lb)                                           Medical History  Surgical History Family History Social History   No past medical history on file. No past surgical history on file. Family History   Problem Relation Age of Onset     Cancer No family hx of         no skin cancer     Skin Cancer No family hx of      Melanoma No family hx of     Social History     Socioeconomic History     Marital status: Single     Spouse name: Not on file     Number of children: Not on file     Years of education: Not on file     Highest education level: Not on file   Occupational History     Not on file   Tobacco Use     Smoking status: Never     Smokeless tobacco: Never   Substance and Sexual Activity     Alcohol use: Not on file     Drug use: Not on file     Sexual activity: Not on file   Other Topics Concern     Parent/sibling w/ CABG, MI or angioplasty before 65F 55M? Not Asked   Social History Narrative     Not on file     Social Determinants of Health     Financial Resource Strain: Not on file   Food Insecurity: Not on file   Transportation Needs: Not on file   Physical Activity: Not on file   Stress: Not on file   Social Connections: Not on file   Intimate Partner Violence: Not on file   Housing Stability: Not on file          Medications  Allergies   Current Outpatient Medications   Medication Sig Dispense Refill     chlorthalidone (HYGROTON) 25 MG tablet 1 tablet Orally once a day       dutasteride (AVODART) 0.5 MG capsule TAKE ONE CAPSULE BY MOUTH ONE TIME DAILY 90 capsule 0     finasteride (PROSCAR) 5 MG tablet One tablet daily as tolerated 90 tablet 3     ketoconazole (NIZORAL) 2 % external  shampoo Apply to scalp, lather, massage into scalp, leave on up to 2 minutes, then rinse 120 mL 5     metoprolol tartrate (LOPRESSOR) 25 MG tablet Take 2 tablets (50 mg) by mouth 2 times daily for 5 days 20 tablet 0     Pyrithione Zinc 2 % SHAM Externally apply topically daily        Tadalafil (CIALIS PO)        UNABLE TO FIND 1 teaspoonful daily MEDICATION NAME: Diatomaceous Earth       Biotin 2500 MCG CAPS Take 15,000 mcg by mouth daily (Patient not taking: Reported on 9/20/2021)       clobetasol propionate (CLOBEX) 0.05 % external shampoo Apply to dry scalp, let sit for 15-20 min, then lather and wash off. Alternate with head & shoulders. (Patient not taking: Reported on 9/20/2021) 118 mL 11     cromolyn (GASTROCROM) 100 MG/5ML (HIGH CONC) solution TAKE 10ML (200MG) BY MOUTH 4 TIMES DAILY, BEFORE MEALS AND NIGHTLY (Patient not taking: Reported on 9/16/2019) 1200 mL 0     cromolyn (OPTICROM) 4 % ophthalmic solution Place 1 drop into both eyes 8 times daily (Patient not taking: Reported on 9/16/2019) 3 Bottle 11     Cyanocobalamin (VITAMIN  B-12) 250 MCG TABS Take by mouth daily (Patient not taking: Reported on 9/20/2021)       fexofenadine (ALLEGRA) 60 MG tablet Take 3 tabs in am (180 mg) and 60 mg in mid afternoon (Patient not taking: Reported on 10/15/2018) 120 tablet 5     Niacinamide 500 MG TBCR  (Patient not taking: Reported on 9/20/2021)       UNABLE TO FIND 1 teaspoonful 6 times daily Organic Ground [place] (Patient not taking: Reported on 9/20/2021)       venlafaxine (EFFEXOR XR) 75 MG 24 hr capsule  (Patient not taking: Reported on 2/17/2023)      Allergies   Allergen Reactions     Alprazolam      Other reaction(s): activated is mast cell     Penicillins Rash     Other reaction(s): Edema         Lab Results    Chemistry/lipid CBC Cardiac Enzymes/BNP/TSH/INR   Lab Results   Component Value Date    CHOL 176 02/04/2019    HDL 69 02/04/2019    TRIG 39 02/04/2019    BUN 24 (H) 01/19/2023     (L)  01/19/2023    CO2 22 01/19/2023    Lab Results   Component Value Date    WBC 7.8 01/19/2023    HGB 15.3 01/19/2023    HCT 44.8 01/19/2023    MCV 89 01/19/2023     01/19/2023    Lab Results   Component Value Date    TROPONINI 0.01 01/19/2023    TSH 0.44 01/19/2023    INR 1.01 01/19/2023

## 2023-02-17 NOTE — LETTER
2/17/2023    [facility] [place]  [address] [address]    RE: Ricky Epstein       Dear Colleague,     I had the pleasure of seeing Ricky Epstein in the [facility].      [facility]  [phone]      Assessment/Recommendations   Patient with recent documentation of significant hypertension, and started on medical therapy.  Episode of lightheadedness, some shortness of breath and tachycardia evaluated in the emergency room.  EKG is not normal with poor R wave progression but no acute ST-T wave changes.  Blood pressure is improved now and at goal today on chlorthalidone and beta-blocker.    He has some chest discomfort which is somewhat atypical and that it is not necessarily provoked with physical activity.  He also has tachycardia but is on a beta-blocker.    Would recommend that he check a hemoglobin A1c and electrolytes today.  Blood sugar was elevated on earlier test at 180.  He was taking prednisone at that time.    Would also do a stress echocardiogram because of the chest discomfort and this will allow us to look at the left ventricle including for left ventricular hypertrophy and any structural abnormalities.  Patient will continue to take his blood pressure and will report back we will call him with the test results.    Would have a low threshold for a sleep evaluation.    Talked about reduce sodium in the diet and reduce carbohydrates.    Thank you for allowing us to participate in his care.       History of Present Illness/Subjective    Mr. Ricky Epstein is a 52 year old male with known recent documentation of hypertension.  He has noticed his heart rate was high and earlier this year was drinking three 5-hour energy drinks a day.  He quit this in June but his heart rate just did not seem to come down.  While he was resting in bed sometimes his heart rate would be in the 90s and if he worked out it would stay over 100 for a prolonged period  of time.  In December he started to notice that his blood pressure was elevated.  He came back from a trip to [place] and was started on antihypertensive medications in the form of chlorthalidone and later started on beta-blocker in the form of metoprolol.  He was evaluated in the emergency room for rapid heartbeat, some discomfort in his chest and lightheaded feeling without specific findings.  EKG showed poor R wave progression and tachycardia but was otherwise unremarkable.  His labs were unremarkable including a normal troponin but his blood sugar was 180 although he was taking prednisone for an ear problem at that time.  He has noticed that his blood pressure is improved.  He continues to workout on a treadmill for 30 minutes without any difficulties and did so this morning without any chest discomfort with but some mild increase shortness of breath which was more than he is typically accustomed to.    Patient has no history of rheumatic fever, heart murmur, cerebrovascular accident or TIA and is never had a syncopal episode.    He grew up on the side of [place].  He is not  but has a significant other.  He does not have children.  He works as a .  He enjoys his work.    He denies orthopnea, paroxysmal nocturnal dyspnea but does snore.  He is unaware that he has sleep apnea.    ECG: Personally reviewed.  See above       Physical Examination Review of Systems   /86 (BP Location: Right arm, Patient Position: Sitting, Cuff Size: Adult Large)   Pulse 82   Resp 20   Wt 112.9 kg (249 lb)   SpO2 97%   BMI 32.30 kg/m    Body mass index is 32.3 kg/m .  Wt Readings from Last 3 Encounters:   02/17/23 112.9 kg (249 lb)   01/19/23 108.9 kg (240 lb)   07/24/17 108.5 kg (239 lb 4.8 oz)     General Appearance:   Alert, cooperative and in no acute distress.   ENT/Mouth: Patient wearing a mask.      EYES:  no scleral icterus, normal conjunctivae   Neck: JVP normal. No Hepatojugular reflux. Thyroid  not visualized.   Chest/Lungs:   Lungs are clear to auscultation, equal chest wall expansion.   Cardiovascular:   S1, S2 without murmur ,clicks or rubs. Brachial, radial and posterior tibial pulses are intact and symetric. No carotid bruits noted   Abdomen:  Nontender. BS+. No bruits.  Rounded   Extremities: No cyanosis, clubbing or edema   Skin: no xanthelasma, warm.    Neurologic: normal arm movement bilateral, no tremors     Psychiatric: Appropriate affect.      Enc Vitals  BP: 132/86  Pulse: 82  Resp: 20  SpO2: 97 %  Weight: 112.9 kg (249 lb)                                           Medical History  Surgical History Family History Social History   No past medical history on file. No past surgical history on file. Family History   Problem Relation Age of Onset     Cancer No family hx of         no skin cancer     Skin Cancer No family hx of      Melanoma No family hx of     Social History     Socioeconomic History     Marital status: Single     Spouse name: Not on file     Number of children: Not on file     Years of education: Not on file     Highest education level: Not on file   Occupational History     Not on file   Tobacco Use     Smoking status: Never     Smokeless tobacco: Never   Substance and Sexual Activity     Alcohol use: Not on file     Drug use: Not on file     Sexual activity: Not on file   Other Topics Concern     Parent/sibling w/ CABG, MI or angioplasty before 65F 55M? Not Asked   Social History Narrative     Not on file     Social Determinants of Health     Financial Resource Strain: Not on file   Food Insecurity: Not on file   Transportation Needs: Not on file   Physical Activity: Not on file   Stress: Not on file   Social Connections: Not on file   Intimate Partner Violence: Not on file   Housing Stability: Not on file          Medications  Allergies   Current Outpatient Medications   Medication Sig Dispense Refill     chlorthalidone (HYGROTON) 25 MG tablet 1 tablet Orally once a day        dutasteride (AVODART) 0.5 MG capsule TAKE ONE CAPSULE BY MOUTH ONE TIME DAILY 90 capsule 0     finasteride (PROSCAR) 5 MG tablet One tablet daily as tolerated 90 tablet 3     ketoconazole (NIZORAL) 2 % external shampoo Apply to scalp, lather, massage into scalp, leave on up to 2 minutes, then rinse 120 mL 5     metoprolol tartrate (LOPRESSOR) 25 MG tablet Take 2 tablets (50 mg) by mouth 2 times daily for 5 days 20 tablet 0     Pyrithione Zinc 2 % SHAM Externally apply topically daily        Tadalafil (CIALIS PO)        UNABLE TO FIND 1 teaspoonful daily MEDICATION NAME: Diatomaceous Earth       Biotin 2500 MCG CAPS Take 15,000 mcg by mouth daily (Patient not taking: Reported on 9/20/2021)       clobetasol propionate (CLOBEX) 0.05 % external shampoo Apply to dry scalp, let sit for 15-20 min, then lather and wash off. Alternate with head & shoulders. (Patient not taking: Reported on 9/20/2021) 118 mL 11     cromolyn (GASTROCROM) 100 MG/5ML (HIGH CONC) solution TAKE 10ML (200MG) BY MOUTH 4 TIMES DAILY, BEFORE MEALS AND NIGHTLY (Patient not taking: Reported on 9/16/2019) 1200 mL 0     cromolyn (OPTICROM) 4 % ophthalmic solution Place 1 drop into both eyes 8 times daily (Patient not taking: Reported on 9/16/2019) 3 Bottle 11     Cyanocobalamin (VITAMIN  B-12) 250 MCG TABS Take by mouth daily (Patient not taking: Reported on 9/20/2021)       fexofenadine (ALLEGRA) 60 MG tablet Take 3 tabs in am (180 mg) and 60 mg in mid afternoon (Patient not taking: Reported on 10/15/2018) 120 tablet 5     Niacinamide 500 MG TBCR  (Patient not taking: Reported on 9/20/2021)       UNABLE TO FIND 1 teaspoonful 6 times daily Organic Ground [place] (Patient not taking: Reported on 9/20/2021)       venlafaxine (EFFEXOR XR) 75 MG 24 hr capsule  (Patient not taking: Reported on 2/17/2023)      Allergies   Allergen Reactions     Alprazolam      Other reaction(s): activated is mast cell     Penicillins Rash     Other reaction(s): Edema         Lab  Results    Chemistry/lipid CBC Cardiac Enzymes/BNP/TSH/INR   Lab Results   Component Value Date    CHOL 176 02/04/2019    HDL 69 02/04/2019    TRIG 39 02/04/2019    BUN 24 (H) 01/19/2023     (L) 01/19/2023    CO2 22 01/19/2023    Lab Results   Component Value Date    WBC 7.8 01/19/2023    HGB 15.3 01/19/2023    HCT 44.8 01/19/2023    MCV 89 01/19/2023     01/19/2023    Lab Results   Component Value Date    TROPONINI 0.01 01/19/2023    TSH 0.44 01/19/2023    INR 1.01 01/19/2023                Thank you for allowing me to participate in the care of your patient.      Sincerely,     Sven Polanco MD     [facility]  cc:   No referring provider defined for this encounter.

## 2023-02-20 DIAGNOSIS — E87.6 HYPOKALEMIA: Primary | ICD-10-CM

## 2023-02-20 RX ORDER — POTASSIUM CHLORIDE 1500 MG/1
20 TABLET, EXTENDED RELEASE ORAL DAILY
Qty: 90 TABLET | Refills: 3 | Status: SHIPPED | OUTPATIENT
Start: 2023-02-20 | End: 2024-01-23

## 2023-02-27 ENCOUNTER — HOSPITAL ENCOUNTER (OUTPATIENT)
Dept: CARDIOLOGY | Facility: CLINIC | Age: 53
Discharge: HOME | End: 2023-02-27
Attending: INTERNAL MEDICINE | Admitting: INTERNAL MEDICINE
Payer: COMMERCIAL

## 2023-02-27 DIAGNOSIS — R73.09 ELEVATED GLUCOSE: ICD-10-CM

## 2023-02-27 DIAGNOSIS — I10 BENIGN ESSENTIAL HYPERTENSION: ICD-10-CM

## 2023-02-27 DIAGNOSIS — R07.2 PRECORDIAL PAIN: ICD-10-CM

## 2023-02-27 DIAGNOSIS — D89.42 IDIOPATHIC MAST CELL ACTIVATION SYNDROME (H): Chronic | ICD-10-CM

## 2023-02-27 DIAGNOSIS — R94.31 NONSPECIFIC ABNORMAL ELECTROCARDIOGRAM (ECG) (EKG): ICD-10-CM

## 2023-02-27 PROCEDURE — 93321 DOPPLER ECHO F-UP/LMTD STD: CPT | Mod: 26 | Performed by: INTERNAL MEDICINE

## 2023-02-27 PROCEDURE — 93325 DOPPLER ECHO COLOR FLOW MAPG: CPT | Mod: 26 | Performed by: INTERNAL MEDICINE

## 2023-02-27 PROCEDURE — 93350 STRESS TTE ONLY: CPT | Mod: 26 | Performed by: INTERNAL MEDICINE

## 2023-02-27 PROCEDURE — 93321 DOPPLER ECHO F-UP/LMTD STD: CPT | Mod: TC

## 2023-02-27 PROCEDURE — C8928 TTE W OR W/O FOL W/CON,STRES: HCPCS

## 2023-02-27 PROCEDURE — 93352 ADMIN ECG CONTRAST AGENT: CPT | Performed by: INTERNAL MEDICINE

## 2023-02-27 PROCEDURE — 93018 CV STRESS TEST I&R ONLY: CPT | Performed by: INTERNAL MEDICINE

## 2023-02-27 PROCEDURE — 255N000002 HC RX 255 OP 636: Performed by: INTERNAL MEDICINE

## 2023-02-27 PROCEDURE — 93016 CV STRESS TEST SUPVJ ONLY: CPT | Performed by: INTERNAL MEDICINE

## 2023-02-27 RX ADMIN — PERFLUTREN 5 ML: 6.52 INJECTION, SUSPENSION INTRAVENOUS at 14:40

## 2023-02-28 ENCOUNTER — TELEPHONE (OUTPATIENT)
Dept: DERMATOLOGY | Facility: CLINIC | Age: 53
End: 2023-02-28
Payer: COMMERCIAL

## 2023-02-28 ENCOUNTER — MYC MEDICAL ADVICE (OUTPATIENT)
Dept: CARDIOLOGY | Facility: CLINIC | Age: 53
End: 2023-02-28
Payer: COMMERCIAL

## 2023-02-28 DIAGNOSIS — I10 BENIGN ESSENTIAL HYPERTENSION: Primary | ICD-10-CM

## 2023-02-28 NOTE — TELEPHONE ENCOUNTER
[facility] in [place]:  They are sending me a list of formulations, still waiting on their e-mail.    [facility]:   3 common options--  1. Finasteride 0.1% with Minoxidil 3%  2. Finasteride 0.1% with Minoxidil 5%  3. Finasteride 0.25% with Minoxidil 5%    The options listed above are available as a solution or foam.    30 grams for $34, 60 grams for $60

## 2023-03-03 ENCOUNTER — LAB (OUTPATIENT)
Dept: CARDIOLOGY | Facility: CLINIC | Age: 53
End: 2023-03-03
Payer: COMMERCIAL

## 2023-03-03 DIAGNOSIS — E87.6 HYPOKALEMIA: ICD-10-CM

## 2023-03-03 LAB
ANION GAP SERPL CALCULATED.3IONS-SCNC: 6 MMOL/L (ref 5–18)
BUN SERPL-MCNC: 18 MG/DL (ref 8–22)
CALCIUM SERPL-MCNC: 9 MG/DL (ref 8.5–10.5)
CHLORIDE BLD-SCNC: 96 MMOL/L (ref 98–107)
CO2 SERPL-SCNC: 27 MMOL/L (ref 22–31)
CREAT SERPL-MCNC: 1.2 MG/DL (ref 0.7–1.3)
GFR SERPL CREATININE-BSD FRML MDRD: 72 ML/MIN/1.73M2
GLUCOSE BLD-MCNC: 93 MG/DL (ref 70–125)
POTASSIUM BLD-SCNC: 3.6 MMOL/L (ref 3.5–5)
SODIUM SERPL-SCNC: 129 MMOL/L (ref 136–145)

## 2023-03-03 PROCEDURE — 36415 COLL VENOUS BLD VENIPUNCTURE: CPT

## 2023-03-03 PROCEDURE — 80048 BASIC METABOLIC PNL TOTAL CA: CPT

## 2023-03-06 LAB — HBA1C MFR BLD: NORMAL %

## 2023-03-21 ENCOUNTER — LAB REQUISITION (OUTPATIENT)
Dept: LAB | Facility: CLINIC | Age: 53
End: 2023-03-21

## 2023-03-21 ENCOUNTER — LAB (OUTPATIENT)
Dept: CARDIOLOGY | Facility: CLINIC | Age: 53
End: 2023-03-21
Payer: COMMERCIAL

## 2023-03-21 ENCOUNTER — TRANSFERRED RECORDS (OUTPATIENT)
Dept: HEALTH INFORMATION MANAGEMENT | Facility: CLINIC | Age: 53
End: 2023-03-21

## 2023-03-21 DIAGNOSIS — Z79.899 OTHER LONG TERM (CURRENT) DRUG THERAPY: ICD-10-CM

## 2023-03-21 DIAGNOSIS — I10 BENIGN ESSENTIAL HYPERTENSION: ICD-10-CM

## 2023-03-21 LAB
ANION GAP SERPL CALCULATED.3IONS-SCNC: 6 MMOL/L (ref 5–18)
BUN SERPL-MCNC: 17 MG/DL (ref 8–22)
CALCIUM SERPL-MCNC: 8.8 MG/DL (ref 8.5–10.5)
CHLORIDE BLD-SCNC: 99 MMOL/L (ref 98–107)
CO2 SERPL-SCNC: 27 MMOL/L (ref 22–31)
CREAT SERPL-MCNC: 1.07 MG/DL (ref 0.7–1.3)
GFR SERPL CREATININE-BSD FRML MDRD: 82 ML/MIN/1.73M2
GLUCOSE BLD-MCNC: 146 MG/DL (ref 70–125)
POTASSIUM BLD-SCNC: 3.7 MMOL/L (ref 3.5–5)
SODIUM SERPL-SCNC: 132 MMOL/L (ref 136–145)

## 2023-03-21 PROCEDURE — 36415 COLL VENOUS BLD VENIPUNCTURE: CPT

## 2023-03-21 PROCEDURE — 80048 BASIC METABOLIC PNL TOTAL CA: CPT

## 2023-03-21 PROCEDURE — 80053 COMPREHEN METABOLIC PANEL: CPT | Performed by: PHYSICIAN ASSISTANT

## 2023-03-22 LAB
ALBUMIN SERPL BCG-MCNC: 4.4 G/DL (ref 3.5–5.2)
ALP SERPL-CCNC: 131 U/L (ref 40–129)
ALT SERPL W P-5'-P-CCNC: 67 U/L (ref 10–50)
ANION GAP SERPL CALCULATED.3IONS-SCNC: 14 MMOL/L (ref 7–15)
AST SERPL W P-5'-P-CCNC: 37 U/L (ref 10–50)
BILIRUB SERPL-MCNC: 0.4 MG/DL
BUN SERPL-MCNC: 18.2 MG/DL (ref 6–20)
CALCIUM SERPL-MCNC: 9.5 MG/DL (ref 8.6–10)
CHLORIDE SERPL-SCNC: 96 MMOL/L (ref 98–107)
CREAT SERPL-MCNC: 1.21 MG/DL (ref 0.67–1.17)
DEPRECATED HCO3 PLAS-SCNC: 24 MMOL/L (ref 22–29)
GFR SERPL CREATININE-BSD FRML MDRD: 71 ML/MIN/1.73M2
GLUCOSE SERPL-MCNC: 106 MG/DL (ref 70–99)
POTASSIUM SERPL-SCNC: 4.1 MMOL/L (ref 3.4–5.3)
PROT SERPL-MCNC: 6.8 G/DL (ref 6.4–8.3)
SODIUM SERPL-SCNC: 134 MMOL/L (ref 136–145)

## 2023-03-23 ENCOUNTER — MYC MEDICAL ADVICE (OUTPATIENT)
Dept: CARDIOLOGY | Facility: CLINIC | Age: 53
End: 2023-03-23
Payer: COMMERCIAL

## 2023-03-23 DIAGNOSIS — I10 BENIGN ESSENTIAL HYPERTENSION: Primary | ICD-10-CM

## 2023-03-24 RX ORDER — METOPROLOL TARTRATE 25 MG/1
50 TABLET, FILM COATED ORAL 2 TIMES DAILY
Qty: 360 TABLET | Refills: 1 | Status: SHIPPED | OUTPATIENT
Start: 2023-03-24 | End: 2023-09-11

## 2023-03-24 RX ORDER — CHLORTHALIDONE 25 MG/1
25 TABLET ORAL DAILY
Qty: 90 TABLET | Refills: 1 | Status: SHIPPED | OUTPATIENT
Start: 2023-03-24 | End: 2023-11-14

## 2023-04-07 ENCOUNTER — OFFICE VISIT (OUTPATIENT)
Dept: CARDIOLOGY | Facility: CLINIC | Age: 53
End: 2023-04-07
Attending: INTERNAL MEDICINE
Payer: COMMERCIAL

## 2023-04-07 VITALS
HEIGHT: 73 IN | DIASTOLIC BLOOD PRESSURE: 82 MMHG | SYSTOLIC BLOOD PRESSURE: 124 MMHG | WEIGHT: 248 LBS | HEART RATE: 88 BPM | BODY MASS INDEX: 32.87 KG/M2 | RESPIRATION RATE: 16 BRPM

## 2023-04-07 DIAGNOSIS — R07.89 ATYPICAL CHEST PAIN: Primary | ICD-10-CM

## 2023-04-07 DIAGNOSIS — R00.0 SINUS TACHYCARDIA: ICD-10-CM

## 2023-04-07 DIAGNOSIS — I10 BENIGN ESSENTIAL HYPERTENSION: ICD-10-CM

## 2023-04-07 DIAGNOSIS — R06.83 SNORING: ICD-10-CM

## 2023-04-07 PROCEDURE — 99214 OFFICE O/P EST MOD 30 MIN: CPT | Performed by: NURSE PRACTITIONER

## 2023-04-07 RX ORDER — TERBINAFINE HYDROCHLORIDE 250 MG/1
1 TABLET ORAL DAILY
COMMUNITY
Start: 2023-03-21 | End: 2024-07-12

## 2023-04-07 NOTE — PATIENT INSTRUCTIONS
Ricky Epstein,    It was a pleasure to see you today at the [facility].     My recommendations after this visit include:    - No medications changes made today    - Follow up with Dr. Polanco in 3 months     - Follow up with sleep clinic     - Please call AMIRA Crowley on [phone]  if you have any questions or concerns    Herb Quiñones, CNP

## 2023-04-07 NOTE — PROGRESS NOTES
Assessment/Recommendations   Assessment:    1.  History of atypical chest pain: Denies recurrent chest pain.  Most recent exercise stress test was negative for inducible myocardial ischemia.    He exercises regularly.  Denies any cardiac symptoms.    2.  Hypertension: Blood pressure today is 124/82.  He is currently on chlorthalidone and metoprolol.  Most recent BMP stable with improvement in sodium level to 134.  He limits his fluid intake now.    3.  History of sinus tachycardia: Heart rate today stable in 80s.  He is on Metropol tartrate.    4.  Possible obstructive sleep apnea: He reports loud snoring during the night.  We discussed about the correlation between obstructive sleep apnea, cardiac arrhythmia, obesity, and hypertension.    5.  Diabetes: Most recent A1C is 6.3 in February 2023.  Denies history of diabetes.    6.  BMI of 32.72: He noted recent gradual weight gain although he is exercising regularly.  He has follow-up appointment with PCP today.    Plan/Recommendation:   -Continue current hypertension regimen  -Provided information on DASH diet and heart healthy diet  - Risk factor modification and lifestyle management topics were discussed including managing comorbidities, weight loss, heart healthy diet, exercise, stress reduction and alcohol use.    - We discussed the importance of tightly controlled blood sugars to prevent cardiovascular disease. Defer to PCP for diabetes managment.  -Referral placed to follow-up with sleep clinic    Follow up with Dr. Polanco in 3 months     History of Present Illness/Subjective    Mr. Ricky Epstein is a 53 year old male with a past medical history of hypertension, BMI of 32.72, atypical chest pain, and sinus tachycardia who is seen at [facility] for follow-up recommendation from Dr. Polanco.    Patient saw Dr. Polanco in February 2023 for consultation regarding atypical chest pain, hypertension, and episode of  "lightheadedness, shortness of breath and tachycardia evaluated in the emergency room.  EKG showed poor R wave progression but no acute ST or T wave changes.  Blood pressure improved after starting on chlorthalidone.  Patient was also started on beta-blocker therapy for tachycardia.  He underwent stress echo which was unremarkable.    Today, Ricky reports feeling much better since his blood pressures been improved.  He denies fatigue, lightheadedness, shortness of breath, dyspnea on exertion, orthopnea, PND, palpitations, chest pain, abdominal fullness/bloating and lower extremity edema.  He does report that he has been told that he snores loudly.  He also noticed his weight is gradually going up although he is exercising regularly and not increasing his calorie intake.  He has follow-up appointment with PCP today.  He exercises for 30 to 40 minutes 5 times a week.  He cut back on his fluid intake because of recent low sodium.    Exercise stress echo test from February 2023: Reviewed  Interpretation Summary  1. The patient achieved good exercise workload without inducible angina.  2. Exercise stress ECG is negative for inducible myocardial ischemia. No  exercise induced cardiac arrhythmia.  3. Exercise stress ECHO is negative for inducible myocardial ischemia.  4. Rest ECHO images showed normal left ventricular size, wall motion and  systolic function. The estimated left ventricular ejection fraction is 60-65%.  At the peak of exercise, there is no new segmental wall motion abnormality.  The estimated left ventricular ejection fraction is more than 70% at the peak  of exercise.       Physical Examination Review of Systems   /82 (BP Location: Right arm, Patient Position: Sitting, Cuff Size: Adult Large)   Pulse 88   Resp 16   Ht 1.854 m (6' 1\")   Wt 112.5 kg (248 lb)   BMI 32.72 kg/m    Body mass index is 32.72 kg/m .  Wt Readings from Last 3 Encounters:   04/07/23 112.5 kg (248 lb)   02/17/23 112.9 kg " "(249 lb)   01/19/23 108.9 kg (240 lb)     General Appearance:   no distress, normal body habitus   ENT/Mouth: membranes moist, no oral lesions or bleeding gums.      EYES:  no scleral icterus, normal conjunctivae   Neck: no carotid bruits or thyromegaly   Chest/Lungs:   lungs are clear to auscultation, no rales or wheezing, equal chest wall expansion    Cardiovascular:   Heart rate regular. Normal first and second heart sounds with no murmurs, rubs, or gallops; the carotid, radial and posterior tibial pulses are intact, Jugular venous pressure flat with no edema bilaterally    Abdomen:  no organomegaly, masses, bruits, or tenderness; bowel sounds are present   Extremities   no cyanosis or clubbing   Radial pulses and Pedal pulses intact and symmetrical.  CMS intact.   Skin: no xanthelasma, warm.    Neurologic: normal  bilateral, no tremors     Psychiatric: alert and oriented x3, calm               Enc Vitals  BP: 124/82  Pulse: 88  Resp: 16  Weight: 112.5 kg (248 lb)  Height: 185.4 cm (6' 1\")                                        Negative unless noted in HPI     Medical History  Surgical History Family History Social History   No past medical history on file. No past surgical history on file. Family History   Problem Relation Age of Onset     Cancer No family hx of         no skin cancer     Skin Cancer No family hx of      Melanoma No family hx of     Social History     Socioeconomic History     Marital status: Single     Spouse name: Not on file     Number of children: Not on file     Years of education: Not on file     Highest education level: Not on file   Occupational History     Not on file   Tobacco Use     Smoking status: Never     Smokeless tobacco: Never   Vaping Use     Vaping status: Never Used   Substance and Sexual Activity     Alcohol use: Not on file     Drug use: Not on file     Sexual activity: Not on file   Other Topics Concern     Parent/sibling w/ CABG, MI or angioplasty before 65F 55M? Not " Asked   Social History Narrative     Not on file     Social Determinants of Health     Financial Resource Strain: Not on file   Food Insecurity: Not on file   Transportation Needs: Not on file   Physical Activity: Not on file   Stress: Not on file   Social Connections: Not on file   Intimate Partner Violence: Not on file   Housing Stability: Not on file          Medications  Allergies   Current Outpatient Medications   Medication Sig Dispense Refill     chlorthalidone (HYGROTON) 25 MG tablet Take 1 tablet (25 mg) by mouth daily 90 tablet 1     clobetasol propionate (CLOBEX) 0.05 % external shampoo Apply to dry scalp, let sit for 15-20 min, then lather and wash off. Alternate with head & shoulders. 118 mL 11     dutasteride (AVODART) 0.5 MG capsule TAKE ONE CAPSULE BY MOUTH ONE TIME DAILY 90 capsule 0     finasteride (PROSCAR) 5 MG tablet One tablet daily as tolerated 90 tablet 3     ketoconazole (NIZORAL) 2 % external shampoo Apply to scalp, lather, massage into scalp, leave on up to 2 minutes, then rinse 120 mL 5     metoprolol tartrate (LOPRESSOR) 25 MG tablet Take 2 tablets (50 mg) by mouth 2 times daily 360 tablet 1     potassium chloride ER (KLOR-CON M) 20 MEQ CR tablet Take 1 tablet (20 mEq) by mouth daily 90 tablet 3     Pyrithione Zinc 2 % SHAM Externally apply topically daily        Tadalafil (CIALIS PO) Take 5 mg by mouth as needed       terbinafine (LAMISIL) 250 MG tablet Take 1 tablet by mouth daily       UNABLE TO FIND 1 teaspoonful daily MEDICATION NAME: Diatomaceous Earth      Allergies   Allergen Reactions     Alprazolam      Other reaction(s): activated is mast cell     Penicillins Rash     Other reaction(s): Edema         Lab Results    Chemistry/lipid CBC Cardiac Enzymes/BNP/TSH/INR   Lab Results   Component Value Date    CHOL 176 02/04/2019    HDL 69 02/04/2019    TRIG 39 02/04/2019    BUN 18.2 03/21/2023     (L) 03/21/2023    CO2 24 03/21/2023    Lab Results   Component Value Date    WBC  7.8 01/19/2023    HGB 15.3 01/19/2023    HCT 44.8 01/19/2023    MCV 89 01/19/2023     01/19/2023    Lab Results   Component Value Date    TROPONINI 0.01 01/19/2023    TSH 0.44 01/19/2023    INR 1.01 01/19/2023        30  minutes spent on the date of encounter doing chart review, review of test results, interpretation with above tests, patient visit and documentation.        This note has been dictated using voice recognition software. Any grammatical, typographical, or context distortions are unintentional and inherent to the software

## 2023-04-07 NOTE — LETTER
4/7/2023    [facility]  [address] [address]    RE: Ricky Epstein       Dear Colleague,     I had the pleasure of seeing Ricky Epstein in the [facility].          Assessment/Recommendations   Assessment:    1.  History of atypical chest pain: Denies recurrent chest pain.  Most recent exercise stress test was negative for inducible myocardial ischemia.    He exercises regularly.  Denies any cardiac symptoms.    2.  Hypertension: Blood pressure today is 124/82.  He is currently on chlorthalidone and metoprolol.  Most recent BMP stable with improvement in sodium level to 134.  He limits his fluid intake now.    3.  History of sinus tachycardia: Heart rate today stable in 80s.  He is on Metropol tartrate.    4.  Possible obstructive sleep apnea: He reports loud snoring during the night.  We discussed about the correlation between obstructive sleep apnea, cardiac arrhythmia, obesity, and hypertension.    5.  Diabetes: Most recent A1C is 6.3 in February 2023.  Denies history of diabetes.    6.  BMI of 32.72: He noted recent gradual weight gain although he is exercising regularly.  He has follow-up appointment with PCP today.    Plan/Recommendation:   -Continue current hypertension regimen  -Provided information on DASH diet and heart healthy diet  - Risk factor modification and lifestyle management topics were discussed including managing comorbidities, weight loss, heart healthy diet, exercise, stress reduction and alcohol use.    - We discussed the importance of tightly controlled blood sugars to prevent cardiovascular disease. Defer to PCP for diabetes managment.  -Referral placed to follow-up with sleep clinic    Follow up with Dr. Polanco in 3 months     History of Present Illness/Subjective    Mr. Ricky Epstein is a 53 year old male with a past medical history of hypertension, BMI of 32.72, atypical chest pain, and sinus tachycardia who is seen at [facility] for follow-up recommendation from Dr. Polanco.    Patient saw Dr. Polanco in February 2023 for consultation regarding atypical chest pain, hypertension, and episode of lightheadedness, shortness of breath and tachycardia evaluated in the emergency room.  EKG showed poor R wave progression but no acute ST or T wave changes.  Blood pressure improved after starting on chlorthalidone.  Patient was also started on beta-blocker therapy for tachycardia.  He underwent stress echo which was unremarkable.    Today, Ricky reports feeling much better since his blood pressures been improved.  He denies fatigue, lightheadedness, shortness of breath, dyspnea on exertion, orthopnea, PND, palpitations, chest pain, abdominal fullness/bloating and lower extremity edema.  He does report that he has been told that he snores loudly.  He also noticed his weight is gradually going up although he is exercising regularly and not increasing his calorie intake.  He has follow-up appointment with PCP today.  He exercises for 30 to 40 minutes 5 times a week.  He cut back on his fluid intake because of recent low sodium.    Exercise stress echo test from February 2023: Reviewed  Interpretation Summary  1. The patient achieved good exercise workload without inducible angina.  2. Exercise stress ECG is negative for inducible myocardial ischemia. No  exercise induced cardiac arrhythmia.  3. Exercise stress ECHO is negative for inducible myocardial ischemia.  4. Rest ECHO images showed normal left ventricular size, wall motion and  systolic function. The estimated left ventricular ejection fraction is 60-65%.  At the peak of exercise, there is no new segmental wall motion abnormality.  The estimated left ventricular ejection fraction is more than 70% at the peak  of exercise.       Physical Examination Review of Systems   /82 (BP Location: Right arm, Patient Position: Sitting, Cuff Size: Adult Large)    "Pulse 88   Resp 16   Ht 1.854 m (6' 1\")   Wt 112.5 kg (248 lb)   BMI 32.72 kg/m    Body mass index is 32.72 kg/m .  Wt Readings from Last 3 Encounters:   04/07/23 112.5 kg (248 lb)   02/17/23 112.9 kg (249 lb)   01/19/23 108.9 kg (240 lb)     General Appearance:   no distress, normal body habitus   ENT/Mouth: membranes moist, no oral lesions or bleeding gums.      EYES:  no scleral icterus, normal conjunctivae   Neck: no carotid bruits or thyromegaly   Chest/Lungs:   lungs are clear to auscultation, no rales or wheezing, equal chest wall expansion    Cardiovascular:   Heart rate regular. Normal first and second heart sounds with no murmurs, rubs, or gallops; the carotid, radial and posterior tibial pulses are intact, Jugular venous pressure flat with no edema bilaterally    Abdomen:  no organomegaly, masses, bruits, or tenderness; bowel sounds are present   Extremities   no cyanosis or clubbing   Radial pulses and Pedal pulses intact and symmetrical.  CMS intact.   Skin: no xanthelasma, warm.    Neurologic: normal  bilateral, no tremors     Psychiatric: alert and oriented x3, calm               Enc Vitals  BP: 124/82  Pulse: 88  Resp: 16  Weight: 112.5 kg (248 lb)  Height: 185.4 cm (6' 1\")                                        Negative unless noted in HPI     Medical History  Surgical History Family History Social History   No past medical history on file. No past surgical history on file. Family History   Problem Relation Age of Onset    Cancer No family hx of         no skin cancer    Skin Cancer No family hx of     Melanoma No family hx of     Social History     Socioeconomic History    Marital status: Single     Spouse name: Not on file    Number of children: Not on file    Years of education: Not on file    Highest education level: Not on file   Occupational History    Not on file   Tobacco Use    Smoking status: Never    Smokeless tobacco: Never   Vaping Use    Vaping status: Never Used   Substance " and Sexual Activity    Alcohol use: Not on file    Drug use: Not on file    Sexual activity: Not on file   Other Topics Concern    Parent/sibling w/ CABG, MI or angioplasty before 65F 55M? Not Asked   Social History Narrative    Not on file     Social Determinants of Health     Financial Resource Strain: Not on file   Food Insecurity: Not on file   Transportation Needs: Not on file   Physical Activity: Not on file   Stress: Not on file   Social Connections: Not on file   Intimate Partner Violence: Not on file   Housing Stability: Not on file          Medications  Allergies   Current Outpatient Medications   Medication Sig Dispense Refill    chlorthalidone (HYGROTON) 25 MG tablet Take 1 tablet (25 mg) by mouth daily 90 tablet 1    clobetasol propionate (CLOBEX) 0.05 % external shampoo Apply to dry scalp, let sit for 15-20 min, then lather and wash off. Alternate with head & shoulders. 118 mL 11    dutasteride (AVODART) 0.5 MG capsule TAKE ONE CAPSULE BY MOUTH ONE TIME DAILY 90 capsule 0    finasteride (PROSCAR) 5 MG tablet One tablet daily as tolerated 90 tablet 3    ketoconazole (NIZORAL) 2 % external shampoo Apply to scalp, lather, massage into scalp, leave on up to 2 minutes, then rinse 120 mL 5    metoprolol tartrate (LOPRESSOR) 25 MG tablet Take 2 tablets (50 mg) by mouth 2 times daily 360 tablet 1    potassium chloride ER (KLOR-CON M) 20 MEQ CR tablet Take 1 tablet (20 mEq) by mouth daily 90 tablet 3    Pyrithione Zinc 2 % SHAM Externally apply topically daily       Tadalafil (CIALIS PO) Take 5 mg by mouth as needed      terbinafine (LAMISIL) 250 MG tablet Take 1 tablet by mouth daily      UNABLE TO FIND 1 teaspoonful daily MEDICATION NAME: Diatomaceous Earth      Allergies   Allergen Reactions    Alprazolam      Other reaction(s): activated is mast cell    Penicillins Rash     Other reaction(s): Edema         Lab Results    Chemistry/lipid CBC Cardiac Enzymes/BNP/TSH/INR   Lab Results   Component Value Date     CHOL 176 02/04/2019    HDL 69 02/04/2019    TRIG 39 02/04/2019    BUN 18.2 03/21/2023     (L) 03/21/2023    CO2 24 03/21/2023    Lab Results   Component Value Date    WBC 7.8 01/19/2023    HGB 15.3 01/19/2023    HCT 44.8 01/19/2023    MCV 89 01/19/2023     01/19/2023    Lab Results   Component Value Date    TROPONINI 0.01 01/19/2023    TSH 0.44 01/19/2023    INR 1.01 01/19/2023        30  minutes spent on the date of encounter doing chart review, review of test results, interpretation with above tests, patient visit and documentation.        This note has been dictated using voice recognition software. Any grammatical, typographical, or context distortions are unintentional and inherent to the software        Thank you for allowing me to participate in the care of your patient.      Sincerely,     OLVIN Ayala CNP     [facility]  cc:   OLVIN Ayala CNP  [address]  [place] [address]

## 2023-06-03 ENCOUNTER — HEALTH MAINTENANCE LETTER (OUTPATIENT)
Age: 53
End: 2023-06-03

## 2023-06-09 ENCOUNTER — TRANSFERRED RECORDS (OUTPATIENT)
Dept: HEALTH INFORMATION MANAGEMENT | Facility: CLINIC | Age: 53
End: 2023-06-09

## 2023-06-09 ENCOUNTER — LAB REQUISITION (OUTPATIENT)
Dept: LAB | Facility: CLINIC | Age: 53
End: 2023-06-09

## 2023-06-09 DIAGNOSIS — I10 ESSENTIAL (PRIMARY) HYPERTENSION: ICD-10-CM

## 2023-06-09 LAB
ALBUMIN SERPL BCG-MCNC: 4.3 G/DL (ref 3.5–5.2)
ALP SERPL-CCNC: 102 U/L (ref 40–129)
ALT SERPL W P-5'-P-CCNC: 50 U/L (ref 10–50)
ANION GAP SERPL CALCULATED.3IONS-SCNC: 13 MMOL/L (ref 7–15)
AST SERPL W P-5'-P-CCNC: 29 U/L (ref 10–50)
BILIRUB SERPL-MCNC: 0.3 MG/DL
BUN SERPL-MCNC: 23.4 MG/DL (ref 6–20)
CALCIUM SERPL-MCNC: 9.3 MG/DL (ref 8.6–10)
CHLORIDE SERPL-SCNC: 106 MMOL/L (ref 98–107)
CHOLEST SERPL-MCNC: 150 MG/DL
CREAT SERPL-MCNC: 1.23 MG/DL (ref 0.67–1.17)
DEPRECATED HCO3 PLAS-SCNC: 20 MMOL/L (ref 22–29)
GFR SERPL CREATININE-BSD FRML MDRD: 69 ML/MIN/1.73M2
GLUCOSE SERPL-MCNC: 134 MG/DL (ref 70–99)
HDLC SERPL-MCNC: 48 MG/DL
LDLC SERPL CALC-MCNC: 88 MG/DL
NONHDLC SERPL-MCNC: 102 MG/DL
POTASSIUM SERPL-SCNC: 4 MMOL/L (ref 3.4–5.3)
PROT SERPL-MCNC: 6.5 G/DL (ref 6.4–8.3)
SODIUM SERPL-SCNC: 139 MMOL/L (ref 136–145)
TRIGL SERPL-MCNC: 70 MG/DL

## 2023-06-09 PROCEDURE — 80053 COMPREHEN METABOLIC PANEL: CPT | Performed by: PHYSICIAN ASSISTANT

## 2023-06-09 PROCEDURE — 80061 LIPID PANEL: CPT | Performed by: PHYSICIAN ASSISTANT

## 2023-07-28 ENCOUNTER — OFFICE VISIT (OUTPATIENT)
Dept: CARDIOLOGY | Facility: CLINIC | Age: 53
End: 2023-07-28
Payer: COMMERCIAL

## 2023-07-28 VITALS
HEART RATE: 76 BPM | HEIGHT: 73 IN | OXYGEN SATURATION: 95 % | RESPIRATION RATE: 16 BRPM | SYSTOLIC BLOOD PRESSURE: 146 MMHG | DIASTOLIC BLOOD PRESSURE: 70 MMHG | WEIGHT: 242.7 LBS | BODY MASS INDEX: 32.17 KG/M2

## 2023-07-28 DIAGNOSIS — R07.89 FEELING OF CHEST TIGHTNESS: Primary | ICD-10-CM

## 2023-07-28 DIAGNOSIS — I10 BENIGN ESSENTIAL HYPERTENSION: ICD-10-CM

## 2023-07-28 DIAGNOSIS — R06.83 SNORING: ICD-10-CM

## 2023-07-28 PROCEDURE — 99214 OFFICE O/P EST MOD 30 MIN: CPT | Performed by: INTERNAL MEDICINE

## 2023-07-28 PROCEDURE — 93000 ELECTROCARDIOGRAM COMPLETE: CPT | Performed by: INTERNAL MEDICINE

## 2023-07-28 RX ORDER — OXYBUTYNIN CHLORIDE 5 MG/1
10 TABLET ORAL DAILY
COMMUNITY
Start: 2023-07-27 | End: 2024-07-12 | Stop reason: DRUGHIGH

## 2023-07-28 RX ORDER — LOSARTAN POTASSIUM 50 MG/1
50 TABLET ORAL 2 TIMES DAILY
COMMUNITY
Start: 2023-07-27 | End: 2023-09-18

## 2023-07-28 NOTE — LETTER
7/28/2023    [facility]  [address] [address]    RE: Ricky Epstein       Dear Colleague,     I had the pleasure of seeing Ricky Epstein in the [facility].      [facility]  [phone]          Assessment/Recommendations   Patient with known hypertension, some chest discomfort symptoms with a normal stress echocardiogram.  He has been feeling great until about a week ago when he started to feel his chest again.  Is not necessarily chest pain but something is just not quite right and there is a little bit of tightness.  We will check an EKG today and if that is unremarkable I would be very comforted by that.    Blood pressures been a little bit higher of late and he will continue to monitor and we may need to increase his antihypertensive medication.  Our goal is to keep his systolic blood pressures under 140 and diastolic well under 90.    ECG showing some nonspecific findings in lead III and aVF with T wave changes but V3 are normal T waves no other acute ST-T wave changes.    He will call with his blood pressure readings in 1 week and also let us know how he is feeling once that he weight goes away.    Thank you for allowing us to participate in his care.       History of Present Illness/Subjective    Mr. Ricky Epstein is a 53 year old male with known hypertension who has been doing very well on his current medical regimen.  His blood pressures have been good but about a week ago he noticed that his blood pressure was creeping up a bit and he started to have unusual feeling in his chest, almost like a chest pressure but not really a chest pressure.  Felt a bit like it did be before his previous evaluation.  Recall, patient had a stress echocardiogram which is unremarkable.  His lipids have been actually good on no treatment.    He denies unusual shortness of breath, orthopnea, paroxysmal nocturnal dyspnea or peripheral edema.  He does get  "sweating easily and was placed on oxybutynin which seems to be helping.       Physical Examination Review of Systems   BP (!) 146/70 (BP Location: Right arm, Patient Position: Sitting, Cuff Size: Adult Large)   Pulse 76   Resp 16   Ht 1.854 m (6' 1\")   Wt 110.1 kg (242 lb 11.2 oz)   SpO2 95%   BMI 32.02 kg/m    Body mass index is 32.02 kg/m .  Wt Readings from Last 3 Encounters:   07/28/23 110.1 kg (242 lb 11.2 oz)   04/07/23 112.5 kg (248 lb)   02/17/23 112.9 kg (249 lb)     General Appearance:   Alert, cooperative and in no acute distress.   ENT/Mouth: Pink/moist oral mucosa   EYES:  no scleral icterus, normal conjunctivae   Neck: JVP normal. No Hepatojugular reflux. Thyroid not visualized.   Chest/Lungs:   Lungs are clear to auscultation, equal chest wall expansion.   Cardiovascular:   S1, S2 without murmur ,clicks or rubs. Brachial, radial and posterior tibial pulses are intact and symetric. No carotid bruits noted   Abdomen:  Nontender.    Extremities: No cyanosis, clubbing or edema   Skin: no xanthelasma, warm.    Neurologic: normal arm movement bilateral, no tremors     Psychiatric: Appropriate affect.      Enc Vitals  BP: (!) 146/70  Pulse: 76  Resp: 16  SpO2: 95 %  Weight: 110.1 kg (242 lb 11.2 oz)  Height: 185.4 cm (6' 1\")                                           Medical History  Surgical History Family History Social History   No past medical history on file. No past surgical history on file. Family History   Problem Relation Age of Onset    Cancer No family hx of         no skin cancer    Skin Cancer No family hx of     Melanoma No family hx of     Social History     Socioeconomic History    Marital status: Single     Spouse name: Not on file    Number of children: Not on file    Years of education: Not on file    Highest education level: Not on file   Occupational History    Not on file   Tobacco Use    Smoking status: Never    Smokeless tobacco: Never   Vaping Use    Vaping Use: Never used "   Substance and Sexual Activity    Alcohol use: Not on file    Drug use: Not on file    Sexual activity: Not on file   Other Topics Concern    Parent/sibling w/ CABG, MI or angioplasty before 65F 55M? Not Asked   Social History Narrative    Not on file     Social Determinants of Health     Financial Resource Strain: Not on file   Food Insecurity: Not on file   Transportation Needs: Not on file   Physical Activity: Not on file   Stress: Not on file   Social Connections: Not on file   Intimate Partner Violence: Not on file   Housing Stability: Not on file          Medications  Allergies   Current Outpatient Medications   Medication Sig Dispense Refill    chlorthalidone (HYGROTON) 25 MG tablet Take 1 tablet (25 mg) by mouth daily 90 tablet 1    clobetasol propionate (CLOBEX) 0.05 % external shampoo Apply to dry scalp, let sit for 15-20 min, then lather and wash off. Alternate with head & shoulders. 118 mL 11    dutasteride (AVODART) 0.5 MG capsule TAKE ONE CAPSULE BY MOUTH ONE TIME DAILY 90 capsule 0    finasteride (PROSCAR) 5 MG tablet One tablet daily as tolerated 90 tablet 3    ketoconazole (NIZORAL) 2 % external shampoo Apply to scalp, lather, massage into scalp, leave on up to 2 minutes, then rinse 120 mL 5    losartan (COZAAR) 50 MG tablet Take 50 mg by mouth daily      metoprolol tartrate (LOPRESSOR) 25 MG tablet Take 2 tablets (50 mg) by mouth 2 times daily 360 tablet 1    oxybutynin (DITROPAN) 5 MG tablet Take 5 mg by mouth daily      potassium chloride ER (KLOR-CON M) 20 MEQ CR tablet Take 1 tablet (20 mEq) by mouth daily 90 tablet 3    Pyrithione Zinc 2 % SHAM Externally apply topically daily       Tadalafil (CIALIS PO) Take 5 mg by mouth as needed      terbinafine (LAMISIL) 250 MG tablet Take 1 tablet by mouth daily      UNABLE TO FIND 1 teaspoonful daily MEDICATION NAME: Diatomaceous Earth      Allergies   Allergen Reactions    Alprazolam      Other reaction(s): activated is mast cell    Penicillins Rash      Other reaction(s): Edema         Lab Results    Chemistry/lipid CBC Cardiac Enzymes/BNP/TSH/INR   Lab Results   Component Value Date    CHOL 150 06/09/2023    HDL 48 06/09/2023    TRIG 70 06/09/2023    BUN 23.4 (H) 06/09/2023     06/09/2023    CO2 20 (L) 06/09/2023    Lab Results   Component Value Date    WBC 7.8 01/19/2023    HGB 15.3 01/19/2023    HCT 44.8 01/19/2023    MCV 89 01/19/2023     01/19/2023    Lab Results   Component Value Date    TROPONINI 0.01 01/19/2023    TSH 0.44 01/19/2023    INR 1.01 01/19/2023                Thank you for allowing me to participate in the care of your patient.      Sincerely,     Sven Polanco MD     [facility]  cc:   Sven Polanco MD  [address]  [place] [address]

## 2023-07-28 NOTE — PROGRESS NOTES
[facility]  [phone]          Assessment/Recommendations   Patient with known hypertension, some chest discomfort symptoms with a normal stress echocardiogram.  He has been feeling great until about a week ago when he started to feel his chest again.  Is not necessarily chest pain but something is just not quite right and there is a little bit of tightness.  We will check an EKG today and if that is unremarkable I would be very comforted by that.    Blood pressures been a little bit higher of late and he will continue to monitor and we may need to increase his antihypertensive medication.  Our goal is to keep his systolic blood pressures under 140 and diastolic well under 90.    ECG showing some nonspecific findings in lead III and aVF with T wave changes but V3 are normal T waves no other acute ST-T wave changes.    He will call with his blood pressure readings in 1 week and also let us know how he is feeling once that he weight goes away.    Thank you for allowing us to participate in his care.       History of Present Illness/Subjective    Mr. Ricky Epstein is a 53 year old male with known hypertension who has been doing very well on his current medical regimen.  His blood pressures have been good but about a week ago he noticed that his blood pressure was creeping up a bit and he started to have unusual feeling in his chest, almost like a chest pressure but not really a chest pressure.  Felt a bit like it did be before his previous evaluation.  Recall, patient had a stress echocardiogram which is unremarkable.  His lipids have been actually good on no treatment.    He denies unusual shortness of breath, orthopnea, paroxysmal nocturnal dyspnea or peripheral edema.  He does get sweating easily and was placed on oxybutynin which seems to be helping.       Physical Examination Review of Systems   BP (!) 146/70 (BP Location: Right arm, Patient Position: Sitting, Cuff Size: Adult Large)    "Pulse 76   Resp 16   Ht 1.854 m (6' 1\")   Wt 110.1 kg (242 lb 11.2 oz)   SpO2 95%   BMI 32.02 kg/m    Body mass index is 32.02 kg/m .  Wt Readings from Last 3 Encounters:   07/28/23 110.1 kg (242 lb 11.2 oz)   04/07/23 112.5 kg (248 lb)   02/17/23 112.9 kg (249 lb)     General Appearance:   Alert, cooperative and in no acute distress.   ENT/Mouth: Pink/moist oral mucosa   EYES:  no scleral icterus, normal conjunctivae   Neck: JVP normal. No Hepatojugular reflux. Thyroid not visualized.   Chest/Lungs:   Lungs are clear to auscultation, equal chest wall expansion.   Cardiovascular:   S1, S2 without murmur ,clicks or rubs. Brachial, radial and posterior tibial pulses are intact and symetric. No carotid bruits noted   Abdomen:  Nontender.    Extremities: No cyanosis, clubbing or edema   Skin: no xanthelasma, warm.    Neurologic: normal arm movement bilateral, no tremors     Psychiatric: Appropriate affect.      Enc Vitals  BP: (!) 146/70  Pulse: 76  Resp: 16  SpO2: 95 %  Weight: 110.1 kg (242 lb 11.2 oz)  Height: 185.4 cm (6' 1\")                                           Medical History  Surgical History Family History Social History   No past medical history on file. No past surgical history on file. Family History   Problem Relation Age of Onset    Cancer No family hx of         no skin cancer    Skin Cancer No family hx of     Melanoma No family hx of     Social History     Socioeconomic History    Marital status: Single     Spouse name: Not on file    Number of children: Not on file    Years of education: Not on file    Highest education level: Not on file   Occupational History    Not on file   Tobacco Use    Smoking status: Never    Smokeless tobacco: Never   Vaping Use    Vaping Use: Never used   Substance and Sexual Activity    Alcohol use: Not on file    Drug use: Not on file    Sexual activity: Not on file   Other Topics Concern    Parent/sibling w/ CABG, MI or angioplasty before 65F 55M? Not Asked "   Social History Narrative    Not on file     Social Determinants of Health     Financial Resource Strain: Not on file   Food Insecurity: Not on file   Transportation Needs: Not on file   Physical Activity: Not on file   Stress: Not on file   Social Connections: Not on file   Intimate Partner Violence: Not on file   Housing Stability: Not on file          Medications  Allergies   Current Outpatient Medications   Medication Sig Dispense Refill    chlorthalidone (HYGROTON) 25 MG tablet Take 1 tablet (25 mg) by mouth daily 90 tablet 1    clobetasol propionate (CLOBEX) 0.05 % external shampoo Apply to dry scalp, let sit for 15-20 min, then lather and wash off. Alternate with head & shoulders. 118 mL 11    dutasteride (AVODART) 0.5 MG capsule TAKE ONE CAPSULE BY MOUTH ONE TIME DAILY 90 capsule 0    finasteride (PROSCAR) 5 MG tablet One tablet daily as tolerated 90 tablet 3    ketoconazole (NIZORAL) 2 % external shampoo Apply to scalp, lather, massage into scalp, leave on up to 2 minutes, then rinse 120 mL 5    losartan (COZAAR) 50 MG tablet Take 50 mg by mouth daily      metoprolol tartrate (LOPRESSOR) 25 MG tablet Take 2 tablets (50 mg) by mouth 2 times daily 360 tablet 1    oxybutynin (DITROPAN) 5 MG tablet Take 5 mg by mouth daily      potassium chloride ER (KLOR-CON M) 20 MEQ CR tablet Take 1 tablet (20 mEq) by mouth daily 90 tablet 3    Pyrithione Zinc 2 % SHAM Externally apply topically daily       Tadalafil (CIALIS PO) Take 5 mg by mouth as needed      terbinafine (LAMISIL) 250 MG tablet Take 1 tablet by mouth daily      UNABLE TO FIND 1 teaspoonful daily MEDICATION NAME: Diatomaceous Earth      Allergies   Allergen Reactions    Alprazolam      Other reaction(s): activated is mast cell    Penicillins Rash     Other reaction(s): Edema         Lab Results    Chemistry/lipid CBC Cardiac Enzymes/BNP/TSH/INR   Lab Results   Component Value Date    CHOL 150 06/09/2023    HDL 48 06/09/2023    TRIG 70 06/09/2023    BUN  23.4 (H) 06/09/2023     06/09/2023    CO2 20 (L) 06/09/2023    Lab Results   Component Value Date    WBC 7.8 01/19/2023    HGB 15.3 01/19/2023    HCT 44.8 01/19/2023    MCV 89 01/19/2023     01/19/2023    Lab Results   Component Value Date    TROPONINI 0.01 01/19/2023    TSH 0.44 01/19/2023    INR 1.01 01/19/2023

## 2023-07-29 LAB
ATRIAL RATE - MUSE: 67 BPM
DIASTOLIC BLOOD PRESSURE - MUSE: NORMAL MMHG
INTERPRETATION ECG - MUSE: NORMAL
P AXIS - MUSE: 21 DEGREES
PR INTERVAL - MUSE: 156 MS
QRS DURATION - MUSE: 138 MS
QT - MUSE: 432 MS
QTC - MUSE: 456 MS
R AXIS - MUSE: -42 DEGREES
SYSTOLIC BLOOD PRESSURE - MUSE: NORMAL MMHG
T AXIS - MUSE: -3 DEGREES
VENTRICULAR RATE- MUSE: 67 BPM

## 2023-08-10 ENCOUNTER — TELEPHONE (OUTPATIENT)
Dept: CARDIOLOGY | Facility: CLINIC | Age: 53
End: 2023-08-10
Payer: COMMERCIAL

## 2023-08-10 DIAGNOSIS — I10 BENIGN ESSENTIAL HYPERTENSION: ICD-10-CM

## 2023-08-10 DIAGNOSIS — R94.31 NONSPECIFIC ABNORMAL ELECTROCARDIOGRAM (ECG) (EKG): ICD-10-CM

## 2023-08-10 DIAGNOSIS — R00.0 SINUS TACHYCARDIA: ICD-10-CM

## 2023-08-10 DIAGNOSIS — R07.89 ATYPICAL CHEST PAIN: ICD-10-CM

## 2023-08-10 DIAGNOSIS — R07.89 FEELING OF CHEST TIGHTNESS: Primary | ICD-10-CM

## 2023-08-10 NOTE — TELEPHONE ENCOUNTER
Spoke to Pt regarding recommendations from Dr. Polanco- see EKG.  Pt indicated still having intermittent chest discomfort. No currently symptomatic. Is interested with proceeding with CCTA. Ordered, message sent to scheduling. Informed Pt of location and time frames for scheduling-SARAHI

## 2023-08-28 ENCOUNTER — OFFICE VISIT (OUTPATIENT)
Dept: SLEEP MEDICINE | Facility: CLINIC | Age: 53
End: 2023-08-28
Attending: NURSE PRACTITIONER
Payer: COMMERCIAL

## 2023-08-28 VITALS
SYSTOLIC BLOOD PRESSURE: 141 MMHG | HEART RATE: 75 BPM | HEIGHT: 73 IN | OXYGEN SATURATION: 97 % | WEIGHT: 240 LBS | BODY MASS INDEX: 31.81 KG/M2 | DIASTOLIC BLOOD PRESSURE: 92 MMHG

## 2023-08-28 DIAGNOSIS — G47.33 OSA (OBSTRUCTIVE SLEEP APNEA): Primary | ICD-10-CM

## 2023-08-28 DIAGNOSIS — R06.83 SNORING: ICD-10-CM

## 2023-08-28 PROCEDURE — 99203 OFFICE O/P NEW LOW 30 MIN: CPT | Performed by: PSYCHIATRY & NEUROLOGY

## 2023-08-28 ASSESSMENT — SLEEP AND FATIGUE QUESTIONNAIRES
HOW LIKELY ARE YOU TO NOD OFF OR FALL ASLEEP WHILE SITTING INACTIVE IN A PUBLIC PLACE: WOULD NEVER DOZE
HOW LIKELY ARE YOU TO NOD OFF OR FALL ASLEEP IN A CAR, WHILE STOPPED FOR A FEW MINUTES IN TRAFFIC: WOULD NEVER DOZE
HOW LIKELY ARE YOU TO NOD OFF OR FALL ASLEEP WHILE LYING DOWN TO REST IN THE AFTERNOON WHEN CIRCUMSTANCES PERMIT: SLIGHT CHANCE OF DOZING
HOW LIKELY ARE YOU TO NOD OFF OR FALL ASLEEP WHILE SITTING AND TALKING TO SOMEONE: WOULD NEVER DOZE
HOW LIKELY ARE YOU TO NOD OFF OR FALL ASLEEP WHEN YOU ARE A PASSENGER IN A CAR FOR AN HOUR WITHOUT A BREAK: SLIGHT CHANCE OF DOZING
HOW LIKELY ARE YOU TO NOD OFF OR FALL ASLEEP WHILE SITTING AND READING: SLIGHT CHANCE OF DOZING
HOW LIKELY ARE YOU TO NOD OFF OR FALL ASLEEP WHILE WATCHING TV: SLIGHT CHANCE OF DOZING
HOW LIKELY ARE YOU TO NOD OFF OR FALL ASLEEP WHILE SITTING QUIETLY AFTER LUNCH WITHOUT ALCOHOL: SLIGHT CHANCE OF DOZING

## 2023-08-28 NOTE — PATIENT INSTRUCTIONS
"          MY TREATMENT INFORMATION FOR SLEEP APNEA-  Ricky Epstein    DOCTOR : Elvia Todd MD        Frequently asked questions:  1. What is Obstructive Sleep Apnea (JULIANA)? JULIANA is the most common type of sleep apnea. Apnea means, \"without breath.\"  Apnea is most often caused by narrowing or collapse of the upper airway as muscles relax during sleep.   Almost everyone has occasional apneas. Most people with sleep apnea have had brief interruptions at night frequently for many years.  The severity of sleep apnea is related to how frequent and severe the events are.   2. What are the consequences of JULIANA? Symptoms include: feeling sleepy during the day, snoring loudly, gasping or stopping of breathing, trouble sleeping, and occasionally morning headaches or heartburn at night.  Sleepiness can be serious and even increase the risk of falling asleep while driving. Other health consequences may include development of high blood pressure and other cardiovascular disease in persons who are susceptible. Untreated JULIANA  can contribute to heart disease, stroke and diabetes.   3. What are the treatment options? In most situations, sleep apnea is a lifelong disease that must be managed with daily therapy. Medications are not effective for sleep apnea and surgery is generally not considered until other therapies have been tried. Your treatment is your choice . Continuous Positive Airway (CPAP) works right away and is the therapy that is effective in nearly everyone. An oral device to hold your jaw forward is usually the next most reliable option. Other options include postioning devices (to keep you off your back), weight loss, and surgery including a tongue pacing device. There is more detail about some of these options below.  4. Are my sleep studies covered by insurance? Although we will request verification of coverage, we advise you also check in advance of the study to ensure there is coverage.    Important tips for those " choosing CPAP and similar devices  For new devices, sign up for device SUSIE to monitor your device for your followup visits  We encourage you to utilize the [employer] susie or website ([employer] web (resmed.com) ) to monitor your therapy progress and share the data with your healthcare team when you discuss your sleep apnea.                                                    Know your equipment:  CPAP is continuous positive airway pressure that prevents obstructive sleep apnea by keeping the throat from collapsing while you are sleeping. In most cases, the device is  smart  and can slowly self-adjusts if your throat collapses and keeps a record every day of how well you are treated-this information is available to you and your care team.  BPAP is bilevel positive airway pressure that keeps your throat open and also assists each breath with a pressure boost to maintain adequate breathing.  Special kinds of BPAP are used in patients who have inadequate breathing from lung or heart disease. In most cases, the device is  smart  and can slowly self-adjusts to assist breathing. Like CPAP, the device keeps a record of how well you are treated.  Your mask is your connection to the device. You get to choose what feels most comfortable and the staff will help to make sure if fits. Here: are some examples of the different masks that are available:       Key points to remember on your journey with sleep apnea:  Sleep study.  PAP devices often need to be adjusted during a sleep study to show that they are effective and adjusted right.  Good tips to remember: Try wearing just the mask during a quiet time during the day so your body adapts to wearing it. A humidifier is recommended for comfort in most cases to prevent drying of your nose and throat. Allergy medication from your provider may help you if you are having nasal congestion.  Getting settled-in. It takes more than one night for most of us to get used to wearing a mask.  Try wearing just the mask during a quiet time during the day so your body adapts to wearing it. A humidifier is recommended for comfort in most cases. Our team will work with you carefully on the first day and will be in contact within 4 days and again at 2 and 4 weeks for advice and remote device adjustments. Your therapy is evaluated by the device each day.   Use it every night. The more you are able to sleep naturally for 7-8 hours, the more likely you will have good sleep and to prevent health risks or symptoms from sleep apnea. Even if you use it 4 hours it helps. Occasionally all of us are unable to use a medical therapy, in sleep apnea, it is not dangerous to miss one night.   Communicate. Call our skilled team on the number provided on the first day if your visit for problems that make it difficult to wear the device. Over 2 out of 3 patients can learn to wear the device long-term with help from our team. Remember to call our team or your sleep providers if you are unable to wear the device as we may have other solutions for those who cannot adapt to mask CPAP therapy. It is recommended that you sleep your sleep provider within the first 3 months and yearly after that if you are not having problems.   Use it for your health. We encourage use of CPAP masks during daytime quiet periods to allow your face and brain to adapt to the sensation of CPAP so that it will be a more natural sensation to awaken to at night or during naps. This can be very useful during the first few weeks or months of adapting to CPAP though it does not help medically to wear CPAP during wakefulness and  should not be used as a strategy just to meet guidelines.  Take care of your equipment. Make sure you clean your mask and tubing using directions every day and that your filter and mask are replaced as recommended or if they are not working.     BESIDES CPAP, WHAT OTHER THERAPIES ARE THERE?    Positioning Device  Positioning devices are  generally used when sleep apnea is mild and only occurs on your back.This example shows a pillow that straps around the waist. It may be appropriate for those whose sleep study shows milder sleep apnea that occurs primarily when lying flat on one's back. Preliminary studies have shown benefit but effectiveness at home may need to be verified by a home sleep test. These devices are generally not covered by medical insurance.  Examples of devices that maintain sleeping on the back to prevent snoring and mild sleep apnea.    Belt type body positioner  http://[employer]/    Electronic reminder  http://nightshifttherapy.com/            Oral Appliance  What is oral appliance therapy?  An oral appliance device fits on your teeth at night like a retainer used after having braces. The device is made by a specialized dentist and requires several visits over 1-2 months before a manufactured device is made to fit your teeth and is adjusted to prevent your sleep apnea. Once an oral device is working properly, snoring should be improved. A home sleep test may be recommended at that time if to determine whether the sleep apnea is adequately treated.       Some things to remember:  -Oral devices are often, but not always, covered by your medical insurance. Be sure to check with your insurance provider.   -If you are referred for oral therapy, you will be given a list of specialized dentists to consider or you may choose to visit the Web site of the American Academy of Dental Sleep Medicine  -Oral devices are less likely to work if you have severe sleep apnea or are extremely overweight.     More detailed information  An oral appliance is a small acrylic device that fits over the upper and lower teeth  (similar to a retainer or a mouth guard). This device slightly moves jaw forward, which moves the base of the tongue forward, opens the airway, improves breathing for effective treat snoring and obstructive sleep apnea in perhaps 7  out of 10 people .  The best working devices are custom-made by a dental device  after a mold is made of the teeth 1, 2, 3.  When is an oral appliance indicated?  Oral appliance therapy is recommended as a first-line treatment for patients with primary snoring, mild sleep apnea, and for patients with moderate sleep apnea who prefer appliance therapy to use of CPAP4, 5. Severity of sleep apnea is determined by sleep testing and is based on the number of respiratory events per hour of sleep.   How successful is oral appliance therapy?  The success rate of oral appliance therapy in patients with mild sleep apnea is 75-80% while in patients with moderate sleep apnea it is 50-70%. The chance of success in patients with severe sleep apnea is 40-50%. The research also shows that oral appliances have a beneficial effect on the cardiovascular health of JULIANA patients at the same magnitude as CPAP therapy7.  Oral appliances should be a second-line treatment in cases of severe sleep apnea, but if not completely successful then a combination therapy utilizing CPAP plus oral appliance therapy may be effective. Oral appliances tend to be effective in a broad range of patients although studies show that the patients who have the highest success are females, younger patients, those with milder disease, and less severe obesity. 3, 6.   Finding a dentist that practices dental sleep medicine  Specific training is available through the American Academy of Dental Sleep Medicine for dentists interested in working in the field of sleep. To find a dentist who is educated in the field of sleep and the use of oral appliances, near you, visit the Web site of the American Academy of Dental Sleep Medicine.    References  1. Rory et al. Objectively measured vs self-reported compliance during oral appliance therapy for sleep-disordered breathing. Chest 2013; 144(5): 6514-6120.  2. Brooke et al. Objective measurement of  compliance during oral appliance therapy for sleep-disordered breathing. Thorax 2013; 68(1): 91-96.  3. Syl, et al. Mandibular advancement devices in 620 men and women with JULIANA and snoring: tolerability and predictors of treatment success. Chest 2004; 125: 9045-8543.  4. James et al. Oral appliances for snoring and JULIANA: a review. Sleep 2006; 29: 244-262.  5. Paulette et al. Oral appliance treatment for JULIANA: an update. J Clin Sleep Med 2014; 10(2): 215-227.  6. Roberta et al. Predictors of OSAH treatment outcome. J Dent Res 2007; 86: 6354-1952.      Weight Loss:    Weight loss is a long-term strategy that may improve sleep apnea in some patients.    Weight management is a personal decision and the decision should be based on your interest and the potential benefits.  If you are interested in exploring weight loss strategies, the following discussion covers the impact on weight loss on sleep apnea and the approaches that may be successful.    Being overweight does not necessarily mean you will have health consequences.  Those who have BMI over 35 or over 27 with existing medical conditions carries greater risk.   Weight loss decreases severity of sleep apnea in most people with obesity. For those with mild obesity who have developed snoring with weight gain, even 15-30 pound weight loss can improve and occasionally eliminate sleep apnea.  Structured and life-long dietary and health habits are necessary to lose weight and keep healthier weight levels.     Though there may be significant health benefits from weight loss, long-term weight loss is very difficult to achieve- studies show success with dietary management in less than 10% of people. In addition, substantial weight loss may require years of dietary control and may be difficult if patients have severe obesity. In these cases, surgical management may be considered.  Finally, older individuals who have tolerated obesity without health complications  may be less likely to benefit from weight loss strategies.      [unfilled]    Surgery:    Surgery for obstructive sleep apnea is considered generally only when other therapies fail to work. Surgery may be discussed with you if you are having a difficult time tolerating CPAP and or when there is an abnormal structure that requires surgical correction.  Nose and throat surgeries often enlarge the airway to prevent collapse.  Most of these surgeries create pain for 1-2 weeks and up to half of the most common surgeries are not effective throughout life.  You should carefully discuss the benefits and drawbacks to surgery with your sleep provider and surgeon to determine if it is the best solution for you.   More information  Surgery for JULIANA is directed at areas that are responsible for narrowing or complete obstruction of the airway during sleep.  There are a wide range of procedures available to enlarge and/or stabilize the airway to prevent blockage of breathing in the three major areas where it can occur: the palate, tongue, and nasal regions.  Successful surgical treatment depends on the accurate identification of the factors responsible for obstructive sleep apnea in each person.  A personalized approach is required because there is no single treatment that works well for everyone.  Because of anatomic variation, consultation with an examination by a sleep surgeon is a critical first step in determining what surgical options are best for each patient.  In some cases, examination during sedation may be recommended in order to guide the selection of procedures.  Patients will be counseled about risks and benefits as well as the typical recovery course after surgery. Surgery is typically not a cure for a person s JULIANA.  However, surgery will often significantly improve one s JULIANA severity (termed  success rate ).  Even in the absence of a cure, surgery will decrease the cardiovascular risk associated with OSA7; improve  overall quality of life8 (sleepiness, functionality, sleep quality, etc).      Palate Procedures:  Patients with JULIANA often have narrowing of their airway in the region of their tonsils and uvula.  The goals of palate procedures are to widen the airway in this region as well as to help the tissues resist collapse.  Modern palate procedure techniques focus on tissue conservation and soft tissue rearrangement, rather than tissue removal.  Often the uvula is preserved in this procedure. Residual sleep apnea is common in patient after pharyngoplasty with an average reduction in sleep apnea events of 33%2.      Tongue Procedures:  ExamWhile patients are awake, the muscles that surround the throat are active and keep this region open for breathing. These muscles relax during sleep, allowing the tongue and other structures to collapse and block breathing.  There are several different tongue procedures available.  Selection of a tongue base procedure depends on characteristics seen on physical exam.  Generally, procedures are aimed at removing bulky tissues in this area or preventing the back of the tongue from falling back during sleep.  Success rates for tongue surgery range from 50-62%3.    Hypoglossal Nerve Stimulation:  Hypoglossal nerve stimulation has recently received approval from the [place] Food and Drug Administration for the treatment of obstructive sleep apnea.  This is based on research showing that the system was safe and effective in treating sleep apnea6.  Results showed that the median AHI score decreased 68%, from 29.3 to 9.0. This therapy uses an implant system that senses breathing patterns and delivers mild stimulation to airway muscles, which keeps the airway open during sleep.  The system consists of three fully implanted components: a small generator (similar in size to a pacemaker), a breathing sensor, and a stimulation lead.  Using a small handheld remote, a patient turns the therapy on  before bed and off upon awakening.    Candidates for this device must be greater than 18 years of age, have moderate to severe JULIANA (AHI between 15-65), BMI less than 35, have tried CPAP/oral appliance for at least 8 weeks without success, and have appropriate upper airway anatomy (determined by a sleep endoscopy performed by Dr. Solitario Michelle).    Hypoglossal Nerve Stimulation Pathway:    The sleep surgeon s office will work with the patient through the insurance prior-authorization process (including communications and appeals).    Nasal Procedures:  Nasal obstruction can interfere with nasal breathing during the day and night.  Studies have shown that relief of nasal obstruction can improve the ability of some patients to tolerate positive airway pressure therapy for obstructive sleep apnea1.  Treatment options include medications such as nasal saline, topical corticosteroid and antihistamine sprays, and oral medications such as antihistamines or decongestants. Non-surgical treatments can include external nasal dilators for selected patients. If these are not successful by themselves, surgery can improve the nasal airway either alone or in combination with these other options.      Combination Procedures:  Combination of surgical procedures and other treatments may be recommended, particularly if patients have more than one area of narrowing or persistent positional disease.  The success rate of combination surgery ranges from 66-80%2,3.    References  Rashaad ESPINOZA. The Role of the Nose in Snoring and Obstructive Sleep Apnoea: An Update.  Eur Arch Otorhinolaryngol. 2011; 268: 1365-73.   Fanny SM; Angeles JA; Dave JR; Pallanch JF; Nivia MB; Dariana SG; Mini CHEEMA. Surgical modifications of the upper airway for obstructive sleep apnea in adults: a systematic review and meta-analysis. SLEEP 2010;33(10):3798-1510. Tung HEADLEY. Hypopharyngeal surgery in obstructive sleep apnea: an evidence-based medicine review.  Arch  Otolaryngol Head Neck Surg. 2006 Feb;132(2):206-13.  Chema YH1, Ebenezer Y, Eleno SARAHI. The efficacy of anatomically based multilevel surgery for obstructive sleep apnea. Otolaryngol Head Neck Surg. 2003 Oct;129(4):327-35.  Kezirian E, Goldberg A. Hypopharyngeal Surgery in Obstructive Sleep Apnea: An Evidence-Based Medicine Review. Arch Otolaryngol Head Neck Surg. 2006 Feb;132(2):206-13.  Tanika MASON et al. Upper-Airway Stimulation for Obstructive Sleep Apnea.  N Engl J Med. 2014 Jan 9;370(2):139-49.  Baldemar Y et al. Increased Incidence of Cardiovascular Disease in Middle-aged Men with Obstructive Sleep Apnea. Am J Respir Crit Care Med; 2002 166: 159-165  Rawlsbrandon MOBLEY et al. Studying Life Effects and Effectiveness of Palatopharyngoplasty (SLEEP) study: Subjective Outcomes of Isolated Uvulopalatopharyngoplasty. Otolaryngol Head Neck Surg. 2011; 144: 623-631.        WHAT IF I ONLY HAVE SNORING?    Mandibular advancement devices, lateral sleep positioning, long-term weight loss and treatment of nasal allergies have been shown to improve snoring.  Exercising tongue muscles with a game (https://apps[employer]/us/susie/soundly-reduce-snoring/qw9746239947) or stimulating the tongue during the day with a device (https://doi.org/10.3390/ypy74076421) have improved snoring in some individuals.    Remember to Drive Safe... Drive Alive     Sleep health profoundly affects your health, mood, and your safety.  Thirty three percent of the population (one in three of us) is not getting enough sleep and many have a sleep disorder. Not getting enough sleep or having an untreated / undertreated sleep condition may make us sleepy without even knowing it. In fact, our driving could be dramatically impaired due to our sleep health. As your provider, here are some things I would like you to know about driving:     Here are some warning signs for impairment and dangerous drowsy driving:              -Having been awake more than 16 hours                -Looking tired               -Eyelid drooping              -Head nodding (it could be too late at this point)              -Driving for more than 30 minutes     Some things you could do to make the driving safer if you are experiencing some drowsiness:              -Stop driving and rest              -Call for transportation              -Make sure your sleep disorder is adequately treated     Some things that have been shown NOT to work when experiencing drowsiness while driving:              -Turning on the radio              -Opening windows              -Eating any  distracting  /  entertaining  foods (e.g., sunflower seeds, candy, or any other)              -Talking on the phone      Your decision may not only impact your life, but also the life of others. Please, remember to drive safe for yourself and all of us.

## 2023-08-28 NOTE — NURSING NOTE
"Chief Complaint   Patient presents with    Sleep Problem     Snoring, dry mouth, stops breathing and has heart issues       Initial BP (!) 141/92   Pulse 75   Ht 1.854 m (6' 1\")   Wt 108.9 kg (240 lb)   SpO2 97%   BMI 31.66 kg/m   Estimated body mass index is 31.66 kg/m  as calculated from the following:    Height as of this encounter: 1.854 m (6' 1\").    Weight as of this encounter: 108.9 kg (240 lb).    Medication Reconciliation: complete  ESS 5  Neck circumference:  43 centimeters.  Jolene Auguste MA       "

## 2023-08-28 NOTE — PROGRESS NOTES
Outpatient Sleep Medicine Consultation:      Name: Ricky Epstein MRN# [id number]   Age: 53 year old YOB: 1970     Date of Consultation: August 28, 2023  Consultation is requested by: OLVIN Ayala CNP  [address]  [place] [address] Herb Quiñones  Primary care provider: [facility]       Reason for Sleep Consult:   Patient referred by Dr. Polanco cardiologist for evaluation of sleep disordered breathing in the setting of hypertension.    Ricky Epstein 53-year-old gentleman with a past medical history significant for hypertension and sinus tachycardia who presented with symptoms concerning for sleep disordered breathing in particular obstructive sleep apnea.    Ricky Epstein main reason for visit: for evaluation of sleep disordered breathing in the setting of hypertension   Patient states problem(s) started:    Ricky Epstein's goals for this visit:             Assessment and Plan:     Summary Sleep Diagnoses:  Possible sleep disordered breathing in particular JULIANA given STOP-BANG score of 7, Mallampati score 3    Plan:   We will recommend home sleep study given patient does not have any comorbid conditions that is warranting in lab PSG.  Home sleep study to evaluate sleep disordered breathing in particular JULIANA.  We discussed pathophysiology and management of sleep disordered breathing.  Discussed positive airway pressure therapy, oral appliance and other options.  Patient is willing to try PAP therapy if he needs it.  Patient was advised on the importance to never drive motor vehicle if he feels sleepy or tired  Patient was advised to follow-up with [facility] after sleep study      Summary Counseling:    Sleep Testing Reviewed  Obstructive Sleep Apnea Reviewed  Complications of Untreated Sleep Apnea Reviewed      Medical Decision-making:   Educational materials provided in instructions      CC: Herb Quiñones     Outpatient Sleep Medicine Staff  I have seen and  evaluated the patient and discussed with the sleep fellow on 8-28-23.  I supervised the fellow during the visit and agree with the documentation.      In addition to face to face time I have also reviewed the patients chart, coordinated care, assisted in placing orders and documentation.  Total time (Face-to-Face, care coordination, orders, documentation) spent on 8-28-23 was 30 minutes.     Barrington Borrego MD           History of Present Illness:   Ricky Epstein 53-year-old gentleman with a past medical history significant for hypertension and sinus tachycardia who presented with symptoms concerning for sleep disordered breathing in particular obstructive sleep apnea.    The patient mentioned that he snores, has witnessed apnea, he is tired during the day. Regarding STOP-BANG questionnaire he scored 7 out of 8.     He denies difficulty staying awake during different situation like watching TV, reading a book or while driving motor vehicle.      Regarding sleep schedule.  He goes to bed around 10 PM, falls asleep within 30 minutes and wakes up around 6 AM in the morning.  He has 1-2 nighttime awakening to go to the bathroom.  He does not have any trouble falling back to sleep.  He feels tired most of the time in the morning.  He denies sleepwalking, sleep talking, sleep paralysis, hallucination, RLS and dream enactment behavior.          SLEEP-WAKE SCHEDULE:     Work/School Days: Patient goes to work:   Yes  Usually gets into bed at  10 pm   Takes patient about   20 min to fall asleep  Wakes up in the middle of the night   1-2 times.  Wakes up due to  go to bathroom   He has trouble falling back asleep   0 times a week.   It usually takes   15 min to get back to sleep  Patient is usually up at 6 am    Uses alarm: Yes      Weekends/Non-work Days/All Other Days:  Usually gets into bed at   10 pm   Takes patient about 20 min   to fall asleep  Patient is usually up at  7 am       Ricky Epstein prefers to sleep in this  position(s):     Patient states they do the following activities in bed:      Patient has had a driving accident or near-miss due to sleepiness/drowsiness:  No       SLEEP DISRUPTIONS:    Breathing/Snoring  Patient snores: Yes  Other people complain about his snoring:  Yes  Patient has been told he stops breathing in his sleep: Yes    Movement:  Patient gets pain, discomfort, with an urge to move:   No   It happens when he is resting:  No   It happens more at night:   No  Patient has been told he kicks his legs at night:   No     Behaviors in Sleep:  Ricky Epstein has experienced the following behaviors while sleeping:  No  He has experienced sudden muscle weakness during the day:  No     CAFFEINE AND OTHER SUBSTANCES:    Patient consumes caffeinated beverages per day: No     Last caffeine use is usually:  No  List of any prescribed or over the counter stimulants that patient takes:  None  List of any prescribed or over the counter sleep medication patient takes: None   List of previous sleep medications that patient has tried:  None   Patient drinks alcohol to help them sleep:  2-4 socially     Family History: Not significant for sleep disorder              SCALES:    EPWORTH SLEEPINESS SCALE          No data to display                  INSOMNIA SEVERITY INDEX (JOSE JUAN)           No data to display                Guidelines for Scoring/Interpretation:  Total score categories:  0-7 = No clinically significant insomnia   8-14 = Subthreshold insomnia   15-21 = Clinical insomnia (moderate severity)  22-28 = Clinical insomnia (severe)  Used via courtesy of www.myhealth.va.gov with permission from Cedric Rodriguez PhD., [facility]      STOP BANG         7/28/2023     2:13 PM   STOP BANG Questionnaire (  2008, the American Society of Anesthesiologists, Inc. [employer]   B/P Clinic: 146/70   BMI Clinic: 32.02         GAD7         No data to display                  CAGE-AID         No data to  "display                CAGE-AID reprinted with permission from the [place] Medical Journal, TAYLOR Branch. and PAGE Kumari, \"Conjoint screening questionnaires for alcohol and drug abuse\" [place] Medical Journal 94: 135-140, 1995.      PATIENT HEALTH QUESTIONNAIRE-9 (PHQ - 9)         No data to display                Developed by Katelynn Reddy, Shu Auguste, Som Montejo and colleagues, with an educational maximiliano from [employer] No permission required to reproduce, translate, display or distribute.        Allergies:    Allergies   Allergen Reactions    Alprazolam      Other reaction(s): activated is mast cell    Penicillins Rash     Other reaction(s): Edema       Medications:    Current Outpatient Medications   Medication Sig Dispense Refill    chlorthalidone (HYGROTON) 25 MG tablet Take 1 tablet (25 mg) by mouth daily 90 tablet 1    clobetasol propionate (CLOBEX) 0.05 % external shampoo Apply to dry scalp, let sit for 15-20 min, then lather and wash off. Alternate with head & shoulders. 118 mL 11    dutasteride (AVODART) 0.5 MG capsule TAKE ONE CAPSULE BY MOUTH ONE TIME DAILY 90 capsule 0    finasteride (PROSCAR) 5 MG tablet One tablet daily as tolerated 90 tablet 3    ketoconazole (NIZORAL) 2 % external shampoo Apply to scalp, lather, massage into scalp, leave on up to 2 minutes, then rinse 120 mL 5    losartan (COZAAR) 50 MG tablet Take 50 mg by mouth daily      metoprolol tartrate (LOPRESSOR) 25 MG tablet Take 2 tablets (50 mg) by mouth 2 times daily 360 tablet 1    oxybutynin (DITROPAN) 5 MG tablet Take 5 mg by mouth daily      potassium chloride ER (KLOR-CON M) 20 MEQ CR tablet Take 1 tablet (20 mEq) by mouth daily 90 tablet 3    Pyrithione Zinc 2 % SHAM Externally apply topically daily       Tadalafil (CIALIS PO) Take 5 mg by mouth as needed      terbinafine (LAMISIL) 250 MG tablet Take 1 tablet by mouth daily      UNABLE TO FIND 1 teaspoonful daily MEDICATION NAME: Diatomaceous Earth   "       Problem List:  Patient Active Problem List    Diagnosis Date Noted    Feeling of chest tightness 07/28/2023     Priority: Medium    Benign essential hypertension 07/28/2023     Priority: Medium    Sinus tachycardia 04/07/2023     Priority: Medium    Idiopathic mast cell activation syndrome (H) 11/05/2015     Priority: Medium    Pruritus of scalp 07/04/2015     Priority: Medium    Lichen plano-pilaris 12/08/2014     Priority: Medium    Dysesthesia 10/05/2014     Priority: Medium    Onycholysis of toenail 08/31/2014     Priority: Medium    Dermatitis 06/12/2014     Priority: Medium        Past Medical/Surgical History:  No past medical history on file.  No past surgical history on file.    Social History:  Social History     Socioeconomic History    Marital status: Single     Spouse name: Not on file    Number of children: Not on file    Years of education: Not on file    Highest education level: Not on file   Occupational History    Not on file   Tobacco Use    Smoking status: Never    Smokeless tobacco: Never   Vaping Use    Vaping Use: Never used   Substance and Sexual Activity    Alcohol use: Not on file    Drug use: Not on file    Sexual activity: Not on file   Other Topics Concern    Parent/sibling w/ CABG, MI or angioplasty before 65F 55M? Not Asked   Social History Narrative    Not on file     Social Determinants of Health     Financial Resource Strain: Not on file   Food Insecurity: Not on file   Transportation Needs: Not on file   Physical Activity: Not on file   Stress: Not on file   Social Connections: Not on file   Intimate Partner Violence: Not on file   Housing Stability: Not on file       Family History:  Family History   Problem Relation Age of Onset    Cancer No family hx of         no skin cancer    Skin Cancer No family hx of     Melanoma No family hx of        Review of Systems:  A complete review of systems reviewed by me is negative with the exeption of what has been mentioned in the  history of present illness.        Physical Examination:  General: No apparent distress, appropriately groomed    Head: Normocephalic, atraumatic    Oropharynx: Mallampati Class: III ;Tonsillar Stage: 1    Neck:Circumference: 17 inches    Chest: No cough, no audible wheezing, able to talk in full sentences    Psych: coherent speech, normal rate and volume, able to articulate logical thoughts, able     to abstract reason, no tangential thoughts, no hallucinations     or delusions  Her affect is normal    Neuro:    Mental status: Alert and  Oriented X 3    Vitals: Blood pressure 141/92, pulse 75, respiration 16, temp 97.7 Fahrenheit, SPO2 97% and BMI 31.66 kg/m2      Mallampati Class: III  Tonsillar Stage: 0-1         Data: All pertinent previous laboratory data reviewed     Recent Labs   Lab Test 06/09/23  1457 03/21/23  1421    134*   POTASSIUM 4.0 4.1   CHLORIDE 106 96*   CO2 20* 24   ANIONGAP 13 14   * 106*   BUN 23.4* 18.2   CR 1.23* 1.21*   ALVERTO 9.3 9.5       Recent Labs   Lab Test 01/19/23  2139   WBC 7.8   RBC 5.06   HGB 15.3   HCT 44.8   MCV 89   MCH 30.2   MCHC 34.2   RDW 13.1          Recent Labs   Lab Test 06/09/23  1457   PROTTOTAL 6.5   ALBUMIN 4.3   BILITOTAL 0.3   ALKPHOS 102   AST 29   ALT 50       TSH   Date Value   01/19/2023 0.44 uIU/mL   11/04/2022 1.59 uIU/mL   06/12/2014 1.68 mU/L       No results found for: UAMP, UBARB, BENZODIAZEUR, UCANN, UCOC, OPIT, UPCP    Iron Saturation Index   Date/Time Value Ref Range Status   11/07/2016 01:37 PM 39 15 - 46 % Final     Ferritin   Date/Time Value Ref Range Status   11/07/2016 01:37  26 - 388 ng/mL Final       No results found for: PH, PHARTERIAL, PO2, [id number], SAT, PCO2, HCO3, BASEEXCESS, DORA, BEB    @LABRCNTIPR(phv:4,pco2v:4,po2v:4,hco3v:4,hernán:4,o2per:4)@    Echocardiology: No results found for this or any previous visit (from the past 4320 hour(s)).    Chest x-ray: No results found for this or any previous visit from the  past 365 days.      Chest CT: No results found for this or any previous visit from the past 365 days.      PFT: Most Recent [facility] Pulmonary Function Testing    No results found for: 20001  No results found for: 20002  No results found for: 20003  No results found for: 20015  No results found for: 20016  No results found for: 20027  No results found for: 20028  No results found for: 20029  No results found for: 20079  No results found for: 20080  No results found for: 20081  No results found for: 20335  No results found for: 20105  No results found for: 20053  No results found for: 20054  No results found for: 20055      Elvia Todd MD 8/28/2023   Fellow Sleep Medicine

## 2023-09-07 ENCOUNTER — HOSPITAL ENCOUNTER (OUTPATIENT)
Dept: CT IMAGING | Facility: CLINIC | Age: 53
Discharge: HOME | End: 2023-09-07
Attending: INTERNAL MEDICINE | Admitting: INTERNAL MEDICINE
Payer: COMMERCIAL

## 2023-09-07 VITALS
WEIGHT: 240 LBS | DIASTOLIC BLOOD PRESSURE: 85 MMHG | HEIGHT: 73 IN | SYSTOLIC BLOOD PRESSURE: 155 MMHG | BODY MASS INDEX: 31.81 KG/M2

## 2023-09-07 DIAGNOSIS — R07.89 ATYPICAL CHEST PAIN: ICD-10-CM

## 2023-09-07 DIAGNOSIS — I10 BENIGN ESSENTIAL HYPERTENSION: ICD-10-CM

## 2023-09-07 DIAGNOSIS — R00.0 SINUS TACHYCARDIA: ICD-10-CM

## 2023-09-07 DIAGNOSIS — R07.89 FEELING OF CHEST TIGHTNESS: ICD-10-CM

## 2023-09-07 DIAGNOSIS — R94.31 NONSPECIFIC ABNORMAL ELECTROCARDIOGRAM (ECG) (EKG): ICD-10-CM

## 2023-09-07 LAB
BSA FOR ECHO PROCEDURE: 0 M2
CREAT BLD-MCNC: 1.2 MG/DL (ref 0.7–1.3)
EGFRCR SERPLBLD CKD-EPI 2021: >60 ML/MIN/1.73M2

## 2023-09-07 PROCEDURE — 75574 CT ANGIO HRT W/3D IMAGE: CPT | Mod: 26 | Performed by: INTERNAL MEDICINE

## 2023-09-07 PROCEDURE — 250N000011 HC RX IP 250 OP 636: Mod: JZ | Performed by: INTERNAL MEDICINE

## 2023-09-07 PROCEDURE — 82565 ASSAY OF CREATININE: CPT

## 2023-09-07 PROCEDURE — 75574 CT ANGIO HRT W/3D IMAGE: CPT

## 2023-09-07 PROCEDURE — 250N000013 HC RX MED GY IP 250 OP 250 PS 637: Performed by: INTERNAL MEDICINE

## 2023-09-07 RX ORDER — NITROGLYCERIN 0.4 MG/1
0.4 TABLET SUBLINGUAL ONCE
Status: COMPLETED | OUTPATIENT
Start: 2023-09-07 | End: 2023-09-07

## 2023-09-07 RX ORDER — DILTIAZEM HYDROCHLORIDE 5 MG/ML
5 INJECTION INTRAVENOUS
Status: DISCONTINUED | OUTPATIENT
Start: 2023-09-07 | End: 2023-09-08 | Stop reason: HOSPADM

## 2023-09-07 RX ORDER — LIDOCAINE 40 MG/G
CREAM TOPICAL
Status: DISCONTINUED | OUTPATIENT
Start: 2023-09-07 | End: 2023-09-08 | Stop reason: HOSPADM

## 2023-09-07 RX ORDER — IOPAMIDOL 755 MG/ML
100 INJECTION, SOLUTION INTRAVASCULAR ONCE
Status: COMPLETED | OUTPATIENT
Start: 2023-09-07 | End: 2023-09-07

## 2023-09-07 RX ORDER — METOPROLOL TARTRATE 1 MG/ML
5 INJECTION, SOLUTION INTRAVENOUS
Status: DISCONTINUED | OUTPATIENT
Start: 2023-09-07 | End: 2023-09-08 | Stop reason: HOSPADM

## 2023-09-07 RX ORDER — DILTIAZEM HYDROCHLORIDE 5 MG/ML
10 INJECTION INTRAVENOUS
Status: DISCONTINUED | OUTPATIENT
Start: 2023-09-07 | End: 2023-09-08 | Stop reason: HOSPADM

## 2023-09-07 RX ADMIN — IOPAMIDOL 100 ML: 755 INJECTION, SOLUTION INTRAVENOUS at 13:31

## 2023-09-07 RX ADMIN — NITROGLYCERIN 0.4 MG: 0.4 TABLET SUBLINGUAL at 13:31

## 2023-09-08 ENCOUNTER — MYC MEDICAL ADVICE (OUTPATIENT)
Dept: CARDIOLOGY | Facility: CLINIC | Age: 53
End: 2023-09-08
Payer: COMMERCIAL

## 2023-09-08 DIAGNOSIS — I10 BENIGN ESSENTIAL HYPERTENSION: Primary | ICD-10-CM

## 2023-09-08 NOTE — TELEPHONE ENCOUNTER
From: Sven Polanco MD  Sent: 9/8/2023   2:43 PM CDT  To: Carline Alvarez  Subject: FW: Blood pressure is a little high              Carline,    I think 50 mg twice a day as fine to try and he should let us know how the blood pressures are doing in the next couple of weeks.    Would also recommend a repeat basic metabolic panel in a couple of weeks.    Thanks,    Sven

## 2023-09-11 RX ORDER — METOPROLOL TARTRATE 25 MG/1
50 TABLET, FILM COATED ORAL 2 TIMES DAILY
Qty: 60 TABLET | Refills: 3 | Status: SHIPPED | OUTPATIENT
Start: 2023-09-11

## 2023-09-15 ENCOUNTER — TRANSFERRED RECORDS (OUTPATIENT)
Dept: HEALTH INFORMATION MANAGEMENT | Facility: CLINIC | Age: 53
End: 2023-09-15

## 2023-09-15 DIAGNOSIS — L64.9 ANDROGENETIC ALOPECIA: ICD-10-CM

## 2023-09-15 DIAGNOSIS — L66.10 LICHEN PLANOPILARIS: ICD-10-CM

## 2023-09-18 RX ORDER — LOSARTAN POTASSIUM 50 MG/1
50 TABLET ORAL 2 TIMES DAILY
Qty: 60 TABLET | Refills: 4 | Status: SHIPPED | OUTPATIENT
Start: 2023-09-18 | End: 2024-03-06

## 2023-09-20 DIAGNOSIS — L66.10 LICHEN PLANOPILARIS: ICD-10-CM

## 2023-09-20 DIAGNOSIS — L64.9 ANDROGENETIC ALOPECIA: ICD-10-CM

## 2023-09-20 RX ORDER — FINASTERIDE 5 MG/1
TABLET, FILM COATED ORAL
Qty: 90 TABLET | Refills: 0 | OUTPATIENT
Start: 2023-09-20

## 2023-09-21 RX ORDER — FINASTERIDE 5 MG/1
TABLET, FILM COATED ORAL
Qty: 90 TABLET | Refills: 0 | Status: SHIPPED | OUTPATIENT
Start: 2023-09-21 | End: 2023-11-28

## 2023-09-21 NOTE — TELEPHONE ENCOUNTER
Finasteride Oral Tablet 5 MG   Resent order,  Order in computer was locked and needing updated order for Pt care.  90 Tabs, sent to pharm for Pt care.     Jane Lees RN  Central Triage Red Flags/Med Refills

## 2023-10-19 ENCOUNTER — OFFICE VISIT (OUTPATIENT)
Dept: SLEEP MEDICINE | Facility: CLINIC | Age: 53
End: 2023-10-19
Payer: COMMERCIAL

## 2023-10-19 DIAGNOSIS — G47.33 OSA (OBSTRUCTIVE SLEEP APNEA): Primary | ICD-10-CM

## 2023-10-19 PROCEDURE — G0399 HOME SLEEP TEST/TYPE 3 PORTA: HCPCS | Performed by: PSYCHIATRY & NEUROLOGY

## 2023-10-19 NOTE — PROGRESS NOTES
Pt is completing a home sleep test. Pt was instructed on how to put on the Noxturnal T3 device and associated equipment before going to bed and given the opportunity to practice putting it on before leaving the sleep center. Pt was reminded to bring the home sleep test kit back to the center tomorrow, at agreed upon time for download and reporting.   Neck circumference: 43 CM / 17 inches.  Yris Brown CMA on 10/19/2023 at 12:16 PM

## 2023-10-20 ENCOUNTER — DOCUMENTATION ONLY (OUTPATIENT)
Dept: SLEEP MEDICINE | Facility: CLINIC | Age: 53
End: 2023-10-20
Payer: COMMERCIAL

## 2023-10-20 NOTE — PROGRESS NOTES
HST POST-STUDY QUESTIONNAIRE    What time did you go to bed?  10:30 pm  How long do you think it took to fall asleep?  30 minutes  What time did you wake up to start the day?  5:30 am  Did you get up during the night at all?  yes  If you woke up, do you remember approximately what time(s)? 3 or 4 times, not sure of times  Did you have any difficulty with the equipment?  No  Did you us any type of treatment with this study?  None  Was the head of the bed elevated? No  Did you sleep in a recliner?  No  Did you stop using CPAP at least 3 days before this test?  NA  Any other information you'd like us to know?

## 2023-10-23 NOTE — PROGRESS NOTES
This HSAT was performed using a Noxturnal T3 device which recorded snore, sound, movement activity, body position, nasal pressure, oronasal thermal airflow, pulse, oximetry and both chest and abdominal respiratory effort. HSAT data was restricted to the time patient states they were in bed.     HSAT was scored using 1B 4% hypopnea rule.     HST AHI (Non-PAT): 32.6  Snoring was reported as mild.  Time with SpO2 below 89% was 0.9 minutes.   Overall signal quality was good     Pt will follow up with sleep provider to determine appropriate therapy.

## 2023-10-29 NOTE — PROCEDURES
"HOME SLEEP STUDY INTERPRETATION        Patient: Ricky Epstein  MRN: [id number]  YOB: 1970  Study Date: 10/19/2023  PCP/Referring Provider: [facility];   Ordering Provider:   Barrington Borrego MD         Indications for Home Study: Ricky Epstein is a 53 year old male with a history of symptoms suggestive of obstructive sleep apnea.    Estimated body mass index is 31.67 kg/m  as calculated from the following:    Height as of 23: 1.854 m (6' 0.99\").    Weight as of 23: 108.9 kg (240 lb).  Total score - [place]: 5 (2023 12:36 PM)  STOP-BAN/8        Data: A full night home sleep study was performed recording the standard physiologic parameters including body position, movement, sound, nasal pressure, thermal oral airflow, chest and abdominal movements with respiratory inductance plethysmography, and oxygen saturation by pulse oximetry. Pulse rate was estimated by oximetry recording. This study was considered adequate based on > 4 hours of quality oximetry and respiratory recording. As specified by the AASM Manual for the Scoring of Sleep and Associated events, version 2.3, Rule VIII.D 1B, 4% oxygen desaturation scoring for hypopneas is used as a standard of care on all home sleep apnea testing.        Analysis Time:  369 minutes        Respiration:   Sleep Associated Hypoxemia: sustained hypoxemia was not present. Baseline oxygen saturation was 96%.  Time with saturation less than or equal to 88% was < 5 minutes. The lowest oxygen saturation was 86%.   Snoring: Snoring was present.  Respiratory events: The home study revealed a presence of 140 obstructive apneas and 41 mixed and central apneas. There were 17 hypopneas resulting in a combined apnea/hypopnea index [AHI] of 33 events per hour.  AHI was 61 per hour supine, 21 per hour on left side, and 15 per hour on right side.   Pattern: Excluding events noted above, respiratory rate and pattern was " Normal.      Heart Rate: By pulse oximetry normal rate was noted.       Assessment:   Severe obstructive sleep apnea.  Sleep associated hypoxemia was present.    Recommendations:  Consider auto-CPAP at 5-15 cmH2O.  Suggest optimizing sleep hygiene and avoiding sleep deprivation.  Weight management.        Diagnosis Code(s): Obstructive Sleep Apnea G47.33, Hypoxemia G47.36    Electronically signed by: Barrington Borrego MD, October 29, 2023   Diplomate, American Board of Psychiatry and Neurology, Sleep Medicine

## 2023-10-30 DIAGNOSIS — G47.33 OSA (OBSTRUCTIVE SLEEP APNEA): ICD-10-CM

## 2023-11-01 ENCOUNTER — VIRTUAL VISIT (OUTPATIENT)
Dept: SLEEP MEDICINE | Facility: CLINIC | Age: 53
End: 2023-11-01
Payer: COMMERCIAL

## 2023-11-01 VITALS
SYSTOLIC BLOOD PRESSURE: 117 MMHG | WEIGHT: 230 LBS | DIASTOLIC BLOOD PRESSURE: 80 MMHG | HEIGHT: 73 IN | HEART RATE: 84 BPM | BODY MASS INDEX: 30.48 KG/M2

## 2023-11-01 DIAGNOSIS — G47.33 OSA (OBSTRUCTIVE SLEEP APNEA): Primary | ICD-10-CM

## 2023-11-01 PROCEDURE — 99213 OFFICE O/P EST LOW 20 MIN: CPT | Mod: VID

## 2023-11-01 ASSESSMENT — SLEEP AND FATIGUE QUESTIONNAIRES
HOW LIKELY ARE YOU TO NOD OFF OR FALL ASLEEP WHEN YOU ARE A PASSENGER IN A CAR FOR AN HOUR WITHOUT A BREAK: SLIGHT CHANCE OF DOZING
HOW LIKELY ARE YOU TO NOD OFF OR FALL ASLEEP IN A CAR, WHILE STOPPED FOR A FEW MINUTES IN TRAFFIC: WOULD NEVER DOZE
HOW LIKELY ARE YOU TO NOD OFF OR FALL ASLEEP WHILE SITTING QUIETLY AFTER LUNCH WITHOUT ALCOHOL: WOULD NEVER DOZE
HOW LIKELY ARE YOU TO NOD OFF OR FALL ASLEEP WHILE SITTING INACTIVE IN A PUBLIC PLACE: WOULD NEVER DOZE
HOW LIKELY ARE YOU TO NOD OFF OR FALL ASLEEP WHILE SITTING AND READING: SLIGHT CHANCE OF DOZING
HOW LIKELY ARE YOU TO NOD OFF OR FALL ASLEEP WHILE SITTING AND TALKING TO SOMEONE: WOULD NEVER DOZE
HOW LIKELY ARE YOU TO NOD OFF OR FALL ASLEEP WHILE LYING DOWN TO REST IN THE AFTERNOON WHEN CIRCUMSTANCES PERMIT: SLIGHT CHANCE OF DOZING
HOW LIKELY ARE YOU TO NOD OFF OR FALL ASLEEP WHILE WATCHING TV: SLIGHT CHANCE OF DOZING

## 2023-11-01 ASSESSMENT — PAIN SCALES - GENERAL: PAINLEVEL: NO PAIN (0)

## 2023-11-01 NOTE — NURSING NOTE
Is the patient currently in the state of [place]? YES    Visit mode:VIDEO    If the visit is dropped, the patient can be reconnected by: VIDEO VISIT: Send to e-mail at: [email or web address]    Will anyone else be joining the visit? NO  (If patient encounters technical issues they should call [phone]    How would you like to obtain your AVS? MyChart    Are changes needed to the allergy or medication list? Pt stated no changes to allergies and Pt stated no med changes    Reason for visit: RECHECK  Has patient had flu shot for current/most recent flu season? If so, when? Yes: 10/06/2023    Franci ABERNATHY

## 2023-11-01 NOTE — PATIENT INSTRUCTIONS
CPAP machines are often rent-to-own over 3-12 months depending on your insurance. During the first 3 months, insurances will often want to see at least 4 hours of use on 70% of nights over a 30 day period. Ideally, you would wear it whenever sleeping, but 4 hours is where health benefits really start.     If you don't like the first mask you get, you can exchange it once in the first month for free. Otherwise, insurance will cover new supplies about every 3 months. They will give you paperwork explaining how often to clean and replace parts when you get the machine.    We have people called our sleep therapy management team who will be checking in on you a few times in the first month. They can access data from your machine and help troubleshoot any problems you may have.    I will see you back about 2-3 months after getting started on the CPAP. That appointment will be made once you get the CPAP.    [facility] LOCATIONS:     SHOWROOM LOCATIONS:    [facility]   [address]  Phone: [phone], option 2, option 1 customer service    [facility]   [address]  [place] [address]  Phone: [phone]    [facility]  [address]  [facility] [address]  Phone: [phone]    [facility]  [address]  [place], [place]  Phone: [phone]    [facility]  [id number]  [facility] [address]  Phone: [phone]    [facility]  [address]  [place], [place]  Phone: [phone]    Business hours 8A-4:30 PM          MY TREATMENT INFORMATION FOR SLEEP APNEA-  Ricky Epstein    DOCTOR : OLVIN James CNP    Am I having a sleep study at a sleep center?  --->Due to normal delays, you will be contacted within 2-4 weeks to schedule    Am I having a home  "sleep study?  --->Watch the video for the device you are using:    -/drop off device- https://www.youtube.com/watch?v=yGGFBdELGhk    Frequently asked questions:  1. What is Obstructive Sleep Apnea (JULIANA)? JULIANA is the most common type of sleep apnea. Apnea means, \"without breath.\"  Apnea is most often caused by narrowing or collapse of the upper airway as muscles relax during sleep.   Almost everyone has occasional apneas. Most people with sleep apnea have had brief interruptions at night frequently for many years.  The severity of sleep apnea is related to how frequent and severe the events are.   2. What are the consequences of JULIANA? Symptoms include: feeling sleepy during the day, snoring loudly, gasping or stopping of breathing, trouble sleeping, and occasionally morning headaches or heartburn at night.  Sleepiness can be serious and even increase the risk of falling asleep while driving. Other health consequences may include development of high blood pressure and other cardiovascular disease in persons who are susceptible. Untreated JULIANA can contribute to heart disease, stroke and diabetes.   3. What are the treatment options? In most situations, sleep apnea is a lifelong disease that must be managed with daily therapy. Medications are not effective for sleep apnea and surgery is generally not considered until other therapies have been tried. Your treatment is your choice . Continuous Positive Airway (CPAP) works right away and is the therapy that is effective in nearly everyone. An oral device to hold your jaw forward is usually the next most reliable option. Other options include postioning devices (to keep you off your back), weight loss, and surgery including a tongue pacing device. There is more detail about some of these options below.  4. Are my sleep studies covered by insurance? Although we will request verification of coverage, we advise you also check in advance of the study to ensure there is " coverage.    Important tips for those choosing CPAP and similar devices  For new devices, sign up for device SUSIE to monitor your device for your followup visits  We encourage you to utilize the [employer] susie or website ([employer] web (resmed.com) ) to monitor your therapy progress and share the data with your healthcare team when you discuss your sleep apnea.                                                    Know your equipment:  CPAP is continuous positive airway pressure that prevents obstructive sleep apnea by keeping the throat from collapsing while you are sleeping. In most cases, the device is  smart  and can slowly self-adjusts if your throat collapses and keeps a record every day of how well you are treated-this information is available to you and your care team.  BPAP is bilevel positive airway pressure that keeps your throat open and also assists each breath with a pressure boost to maintain adequate breathing.  Special kinds of BPAP are used in patients who have inadequate breathing from lung or heart disease. In most cases, the device is  smart  and can slowly self-adjusts to assist breathing. Like CPAP, the device keeps a record of how well you are treated.  Your mask is your connection to the device. You get to choose what feels most comfortable and the staff will help to make sure if fits. Here: are some examples of the different masks that are available:       Key points to remember on your journey with sleep apnea:  Sleep study.  PAP devices often need to be adjusted during a sleep study to show that they are effective and adjusted right.  Good tips to remember: Try wearing just the mask during a quiet time during the day so your body adapts to wearing it. A humidifier is recommended for comfort in most cases to prevent drying of your nose and throat. Allergy medication from your provider may help you if you are having nasal congestion.  Getting settled-in. It takes more than one night for most  of us to get used to wearing a mask. Try wearing just the mask during a quiet time during the day so your body adapts to wearing it. A humidifier is recommended for comfort in most cases. Our team will work with you carefully on the first day and will be in contact within 4 days and again at 2 and 4 weeks for advice and remote device adjustments. Your therapy is evaluated by the device each day.   Use it every night. The more you are able to sleep naturally for 7-8 hours, the more likely you will have good sleep and to prevent health risks or symptoms from sleep apnea. Even if you use it 4 hours it helps. Occasionally all of us are unable to use a medical therapy, in sleep apnea, it is not dangerous to miss one night.   Communicate. Call our skilled team on the number provided on the first day if your visit for problems that make it difficult to wear the device. Over 2 out of 3 patients can learn to wear the device long-term with help from our team. Remember to call our team or your sleep providers if you are unable to wear the device as we may have other solutions for those who cannot adapt to mask CPAP therapy. It is recommended that you sleep your sleep provider within the first 3 months and yearly after that if you are not having problems.   Use it for your health. We encourage use of CPAP masks during daytime quiet periods to allow your face and brain to adapt to the sensation of CPAP so that it will be a more natural sensation to awaken to at night or during naps. This can be very useful during the first few weeks or months of adapting to CPAP though it does not help medically to wear CPAP during wakefulness and  should not be used as a strategy just to meet guidelines.  Take care of your equipment. Make sure you clean your mask and tubing using directions every day and that your filter and mask are replaced as recommended or if they are not working.     BESIDES CPAP, WHAT OTHER THERAPIES ARE  THERE?    Positioning Device  Positioning devices are generally used when sleep apnea is mild and only occurs on your back.This example shows a pillow that straps around the waist. It may be appropriate for those whose sleep study shows milder sleep apnea that occurs primarily when lying flat on one's back. Preliminary studies have shown benefit but effectiveness at home may need to be verified by a home sleep test. These devices are generally not covered by medical insurance.  Examples of devices that maintain sleeping on the back to prevent snoring and mild sleep apnea.    Belt type body positioner  http://[employer]    Electronic reminder  http://nightshifttherapy.com/            Oral Appliance  What is oral appliance therapy?  An oral appliance device fits on your teeth at night like a retainer used after having braces. The device is made by a specialized dentist and requires several visits over 1-2 months before a manufactured device is made to fit your teeth and is adjusted to prevent your sleep apnea. Once an oral device is working properly, snoring should be improved. A home sleep test may be recommended at that time if to determine whether the sleep apnea is adequately treated.       Some things to remember:  -Oral devices are often, but not always, covered by your medical insurance. Be sure to check with your insurance provider.   -If you are referred for oral therapy, you will be given a list of specialized dentists to consider or you may choose to visit the Web site of the American Academy of Dental Sleep Medicine  -Oral devices are less likely to work if you have severe sleep apnea or are extremely overweight.     More detailed information  An oral appliance is a small acrylic device that fits over the upper and lower teeth  (similar to a retainer or a mouth guard). This device slightly moves jaw forward, which moves the base of the tongue forward, opens the airway, improves breathing for effective  treat snoring and obstructive sleep apnea in perhaps 7 out of 10 people .  The best working devices are custom-made by a dental device  after a mold is made of the teeth 1, 2, 3.  When is an oral appliance indicated?  Oral appliance therapy is recommended as a first-line treatment for patients with primary snoring, mild sleep apnea, and for patients with moderate sleep apnea who prefer appliance therapy to use of CPAP4, 5. Severity of sleep apnea is determined by sleep testing and is based on the number of respiratory events per hour of sleep.   How successful is oral appliance therapy?  The success rate of oral appliance therapy in patients with mild sleep apnea is 75-80% while in patients with moderate sleep apnea it is 50-70%. The chance of success in patients with severe sleep apnea is 40-50%. The research also shows that oral appliances have a beneficial effect on the cardiovascular health of JULIANA patients at the same magnitude as CPAP therapy7.  Oral appliances should be a second-line treatment in cases of severe sleep apnea, but if not completely successful then a combination therapy utilizing CPAP plus oral appliance therapy may be effective. Oral appliances tend to be effective in a broad range of patients although studies show that the patients who have the highest success are females, younger patients, those with milder disease, and less severe obesity. 3, 6.   Finding a dentist that practices dental sleep medicine  Specific training is available through the American Academy of Dental Sleep Medicine for dentists interested in working in the field of sleep. To find a dentist who is educated in the field of sleep and the use of oral appliances, near you, visit the Web site of the American Academy of Dental Sleep Medicine.    References  1. Rory et al. Objectively measured vs self-reported compliance during oral appliance therapy for sleep-disordered breathing. Chest 2013; 144(5):  9970-0057.  2. Brooke et al. Objective measurement of compliance during oral appliance therapy for sleep-disordered breathing. Thorax 2013; 68(1): 91-96.  3. Syl et al. Mandibular advancement devices in 620 men and women with JULIANA and snoring: tolerability and predictors of treatment success. Chest 2004; 125: 2240-8579.  4. James, et al. Oral appliances for snoring and JULIANA: a review. Sleep 2006; 29: 244-262.  5. Paulette et al. Oral appliance treatment for JULIANA: an update. J Clin Sleep Med 2014; 10(2): 215-227.  6. Roberta et al. Predictors of OSAH treatment outcome. J Dent Res 2007; 86: 4022-7210.      Weight Loss:    Weight loss is a long-term strategy that may improve sleep apnea in some patients.    Weight management is a personal decision and the decision should be based on your interest and the potential benefits.  If you are interested in exploring weight loss strategies, the following discussion covers the impact on weight loss on sleep apnea and the approaches that may be successful.    Being overweight does not necessarily mean you will have health consequences.  Those who have BMI over 35 or over 27 with existing medical conditions carries greater risk.   Weight loss decreases severity of sleep apnea in most people with obesity. For those with mild obesity who have developed snoring with weight gain, even 15-30 pound weight loss can improve and occasionally eliminate sleep apnea.  Structured and life-long dietary and health habits are necessary to lose weight and keep healthier weight levels.     Though there may be significant health benefits from weight loss, long-term weight loss is very difficult to achieve- studies show success with dietary management in less than 10% of people. In addition, substantial weight loss may require years of dietary control and may be difficult if patients have severe obesity. In these cases, surgical management may be considered.  Finally, older  individuals who have tolerated obesity without health complications may be less likely to benefit from weight loss strategies.      BMI 30    Surgery:    Surgery for obstructive sleep apnea is considered generally only when other therapies fail to work. Surgery may be discussed with you if you are having a difficult time tolerating CPAP and or when there is an abnormal structure that requires surgical correction.  Nose and throat surgeries often enlarge the airway to prevent collapse.  Most of these surgeries create pain for 1-2 weeks and up to half of the most common surgeries are not effective throughout life.  You should carefully discuss the benefits and drawbacks to surgery with your sleep provider and surgeon to determine if it is the best solution for you.   More information  Surgery for JULIANA is directed at areas that are responsible for narrowing or complete obstruction of the airway during sleep.  There are a wide range of procedures available to enlarge and/or stabilize the airway to prevent blockage of breathing in the three major areas where it can occur: the palate, tongue, and nasal regions.  Successful surgical treatment depends on the accurate identification of the factors responsible for obstructive sleep apnea in each person.  A personalized approach is required because there is no single treatment that works well for everyone.  Because of anatomic variation, consultation with an examination by a sleep surgeon is a critical first step in determining what surgical options are best for each patient.  In some cases, examination during sedation may be recommended in order to guide the selection of procedures.  Patients will be counseled about risks and benefits as well as the typical recovery course after surgery. Surgery is typically not a cure for a person s JULIANA.  However, surgery will often significantly improve one s JULIANA severity (termed  success rate ).  Even in the absence of a cure, surgery will  decrease the cardiovascular risk associated with OSA7; improve overall quality of life8 (sleepiness, functionality, sleep quality, etc).  Palate Procedures:  Patients with JULIANA often have narrowing of their airway in the region of their tonsils and uvula.  The goals of palate procedures are to widen the airway in this region as well as to help the tissues resist collapse.  Modern palate procedure techniques focus on tissue conservation and soft tissue rearrangement, rather than tissue removal.  Often the uvula is preserved in this procedure. Residual sleep apnea is common in patient after pharyngoplasty with an average reduction in sleep apnea events of 33%2.    Tongue Procedures:  Exam while patients are awake, the muscles that surround the throat are active and keep this region open for breathing. These muscles relax during sleep, allowing the tongue and other structures to collapse and block breathing.  There are several different tongue procedures available.  Selection of a tongue base procedure depends on characteristics seen on physical exam.  Generally, procedures are aimed at removing bulky tissues in this area or preventing the back of the tongue from falling back during sleep.  Success rates for tongue surgery range from 50-62%3.  Hypoglossal Nerve Stimulation:  Hypoglossal nerve stimulation has recently received approval from the [place] Food and Drug Administration for the treatment of obstructive sleep apnea.  This is based on research showing that the system was safe and effective in treating sleep apnea6.  Results showed that the median AHI score decreased 68%, from 29.3 to 9.0. This therapy uses an implant system that senses breathing patterns and delivers mild stimulation to airway muscles, which keeps the airway open during sleep.  The system consists of three fully implanted components: a small generator (similar in size to a pacemaker), a breathing sensor, and a stimulation lead.  Using a  small handheld remote, a patient turns the therapy on before bed and off upon awakening.    Candidates for this device must be greater than 18 years of age, have moderate to severe JULIANA (AHI between 15-65), BMI less than 35, have tried CPAP/oral appliance for at least 8 weeks without success, and have appropriate upper airway anatomy (determined by a sleep endoscopy performed by Dr. Solitario Michelle).  Hypoglossal Nerve Stimulation Pathway:    The sleep surgeon s office will work with the patient through the insurance prior-authorization process (including communications and appeals).    Nasal Procedures:  Nasal obstruction can interfere with nasal breathing during the day and night.  Studies have shown that relief of nasal obstruction can improve the ability of some patients to tolerate positive airway pressure therapy for obstructive sleep apnea1.  Treatment options include medications such as nasal saline, topical corticosteroid and antihistamine sprays, and oral medications such as antihistamines or decongestants. Non-surgical treatments can include external nasal dilators for selected patients. If these are not successful by themselves, surgery can improve the nasal airway either alone or in combination with these other options.  Combination Procedures:  Combination of surgical procedures and other treatments may be recommended, particularly if patients have more than one area of narrowing or persistent positional disease.  The success rate of combination surgery ranges from 66-80%2,3.    References  Rashaad ESPINOZA. The Role of the Nose in Snoring and Obstructive Sleep Apnoea: An Update.  Eur Arch Otorhinolaryngol. 2011; 268: 1365-73.   Fanny SM; Angeles JA; Dave JR; Pallanch JF; Nivia MB; Dariana SG; Mini CHEEMA. Surgical modifications of the upper airway for obstructive sleep apnea in adults: a systematic review and meta-analysis. SLEEP 2010;33(10):3230-3992. Tung HEADLEY. Hypopharyngeal surgery in obstructive sleep  apnea: an evidence-based medicine review.  Arch Otolaryngol Head Neck Surg. 2006 Feb;132(2):206-13.  Chema YH1, Ebenezer Y, Eleno SARAHI. The efficacy of anatomically based multilevel surgery for obstructive sleep apnea. Otolaryngol Head Neck Surg. 2003 Oct;129(4):327-35.  Tung HEADLEY, Goldberg A. Hypopharyngeal Surgery in Obstructive Sleep Apnea: An Evidence-Based Medicine Review. Arch Otolaryngol Head Neck Surg. 2006 Feb;132(2):206-13.  Tanika MASON et al. Upper-Airway Stimulation for Obstructive Sleep Apnea.  N Engl J Med. 2014 Jan 9;370(2):139-49.  Baldemar Y et al. Increased Incidence of Cardiovascular Disease in Middle-aged Men with Obstructive Sleep Apnea. Am J Respir Crit Care Med; 2002 166: 159-165  Rawlsbrandon MOBLEY et al. Studying Life Effects and Effectiveness of Palatopharyngoplasty (SLEEP) study: Subjective Outcomes of Isolated Uvulopalatopharyngoplasty. Otolaryngol Head Neck Surg. 2011; 144: 623-631.    WHAT IF I ONLY HAVE SNORING?    Mandibular advancement devices, lateral sleep positioning, long-term weight loss and treatment of nasal allergies have been shown to improve snoring.  Exercising tongue muscles with a game (https://[employer]/us/susie/soundly-reduce-snoring/bk3909402398) or stimulating the tongue during the day with a device (https://doi.org/10.3390/odn57702322) have improved snoring in some individuals.    Remember to Drive Safe... Drive Alive     Sleep health profoundly affects your health, mood, and your safety.  Thirty three percent of the population (one in three of us) is not getting enough sleep and many have a sleep disorder. Not getting enough sleep or having an untreated / undertreated sleep condition may make us sleepy without even knowing it. In fact, our driving could be dramatically impaired due to our sleep health. As your provider, here are some things I would like you to know about driving:     Here are some warning signs for impairment and dangerous drowsy driving:              -Having  been awake more than 16 hours               -Looking tired               -Eyelid drooping              -Head nodding (it could be too late at this point)              -Driving for more than 30 minutes     Some things you could do to make the driving safer if you are experiencing some drowsiness:              -Stop driving and rest              -Call for transportation              -Make sure your sleep disorder is adequately treated     Some things that have been shown NOT to work when experiencing drowsiness while driving:              -Turning on the radio              -Opening windows              -Eating any  distracting  /  entertaining  foods (e.g., sunflower seeds, candy, or any other)              -Talking on the phone      Your decision may not only impact your life, but also the life of others. Please, remember to drive safe for yourself and all of us.

## 2023-11-01 NOTE — PROGRESS NOTES
Virtual Visit Details    Type of service: Video Visit   Start time: 3:22 PM  End time: 3:41 PM  Originating Location (pt. Location): Home    Distant Location (provider location): On-site  Platform used for Video Visit: [facility]    Sleep Study Follow-Up Visit:    Date on this visit: 11/1/2023    Ricky Epstein comes in today for follow-up of his home sleep study done on 10/19/2023 for snoring, witnessed apneic spells, and tiredness during the day. He also has HTN and sinus tachycardia.        These findings were reviewed with patient.     Past medical/surgical history, family history, social history, medications and allergies were reviewed.      Problem List:  Patient Active Problem List    Diagnosis Date Noted    Feeling of chest tightness 07/28/2023     Priority: Medium    Benign essential hypertension 07/28/2023     Priority: Medium    Sinus tachycardia 04/07/2023     Priority: Medium    Idiopathic mast cell activation syndrome (H24) 11/05/2015     Priority: Medium    Pruritus of scalp 07/04/2015     Priority: Medium    Lichen plano-pilaris 12/08/2014     Priority: Medium    Dysesthesia 10/05/2014     Priority: Medium    Onycholysis of toenail 08/31/2014     Priority: Medium    Dermatitis 06/12/2014     Priority: Medium      Impression/Plan:  (G47.33) JULIANA (obstructive sleep apnea) (primary encounter diagnosis)  Comment: Ricky presents to the sleep clinic to review the results of his home sleep study done on 10/19/2023. The results of his sleep study were reviewed in depth. Informed Ricky he has severe obstructive sleep apnea without sleep associated hypoxemia. We reviewed treatment options for severe apnea including CPAP or oral appliance therapy. Ricky is interested in trying CPAP.   Plan: Comprehensive DME  An order was placed for Auto-PAP 5-15 cm H2O. We reviewed compliance goals, mask exchange policy, and patient was enrolled in [facility]. Ricky was shown different mask options.     He will follow up with me in  about 3 month(s) to document compliance with his machine.     Total time spent reviewing medical records, history and physical examination, review of previous testing and interpretation as well as documentation on this date: 24 minutes    OLVIN James CNP 11/1/2023    CC: Clinic, [facility]

## 2023-11-10 ENCOUNTER — APPOINTMENT (OUTPATIENT)
Dept: CARDIOLOGY | Facility: CLINIC | Age: 53
End: 2023-11-10
Payer: COMMERCIAL

## 2023-11-10 ENCOUNTER — LAB (OUTPATIENT)
Dept: CARDIOLOGY | Facility: CLINIC | Age: 53
End: 2023-11-10
Payer: COMMERCIAL

## 2023-11-10 DIAGNOSIS — I10 BENIGN ESSENTIAL HYPERTENSION: ICD-10-CM

## 2023-11-10 LAB
ANION GAP SERPL CALCULATED.3IONS-SCNC: 10 MMOL/L (ref 7–15)
BUN SERPL-MCNC: 21.1 MG/DL (ref 6–20)
CALCIUM SERPL-MCNC: 9.5 MG/DL (ref 8.6–10)
CHLORIDE SERPL-SCNC: 102 MMOL/L (ref 98–107)
CREAT SERPL-MCNC: 1.27 MG/DL (ref 0.67–1.17)
DEPRECATED HCO3 PLAS-SCNC: 25 MMOL/L (ref 22–29)
EGFRCR SERPLBLD CKD-EPI 2021: 67 ML/MIN/1.73M2
GLUCOSE SERPL-MCNC: 95 MG/DL (ref 70–99)
POTASSIUM SERPL-SCNC: 4.1 MMOL/L (ref 3.4–5.3)
SODIUM SERPL-SCNC: 137 MMOL/L (ref 135–145)

## 2023-11-10 PROCEDURE — 80048 BASIC METABOLIC PNL TOTAL CA: CPT

## 2023-11-10 PROCEDURE — 36415 COLL VENOUS BLD VENIPUNCTURE: CPT

## 2023-11-14 ENCOUNTER — DOCUMENTATION ONLY (OUTPATIENT)
Dept: SLEEP MEDICINE | Facility: CLINIC | Age: 53
End: 2023-11-14
Payer: COMMERCIAL

## 2023-11-14 ENCOUNTER — MYC MEDICAL ADVICE (OUTPATIENT)
Dept: CARDIOLOGY | Facility: CLINIC | Age: 53
End: 2023-11-14

## 2023-11-14 DIAGNOSIS — I10 BENIGN ESSENTIAL HYPERTENSION: Primary | ICD-10-CM

## 2023-11-14 DIAGNOSIS — R89.9 ABNORMAL LABORATORY TEST RESULT: ICD-10-CM

## 2023-11-14 DIAGNOSIS — G47.33 OBSTRUCTIVE SLEEP APNEA (ADULT) (PEDIATRIC): Primary | ICD-10-CM

## 2023-11-14 RX ORDER — CHLORTHALIDONE 25 MG/1
12.5 TABLET ORAL DAILY
Start: 2023-11-14 | End: 2023-12-14 | Stop reason: ALTCHOICE

## 2023-11-14 NOTE — PROGRESS NOTES
Patient was offered choice of vendor and chose [facility].  Patient Ricky Epstein was set up at [place]  on November 14, 2023. Patient received a Resmed Airsense 10 Pressures were set at  5-15 cm H2O.   Patient s ramp is 5 cm H2O for Off and FLEX/EPR is EPR, 2.  Patient received a Resmed Mask name: Airfit F20  Full Face mask size Large, heated tubing and heated humidifier.  Patient has the following compliance requirements: none  Patient has a follow up on TBD with BONILAL Suarez

## 2023-11-14 NOTE — TELEPHONE ENCOUNTER
From: Sven Polanco MD  Sent: 11/14/2023  12:17 PM CST  To: Carline Alvarez  Subject: FW: Blood pressure has been good.                Carline,    Could he cut down on chlorthalidone to 12.5 mg a day.  Could be to recheck a basic metabolic panel in 2 to 3 weeks.  Could he get renal ultrasound and renal Doppler arterial study as well.    Thanks,    Sven

## 2023-11-17 ENCOUNTER — DOCUMENTATION ONLY (OUTPATIENT)
Dept: SLEEP MEDICINE | Facility: CLINIC | Age: 53
End: 2023-11-17
Payer: COMMERCIAL

## 2023-11-17 NOTE — PROGRESS NOTES
3 day Sleep therapy management telephone visit    Diagnostic AHI:  32.6 HST    Confirmed with patient at time of call- N/A Patient is still interested in STM service       Message left for patient to return call    Order settings:  CPAP MIN CPAP MAX   5 cm H2O 15 cm H2O       Device settings:  CPAP MIN CPAP MAX EPR RESMED SOFT RESPONSE SETTING   5 cm  H20 15 2 OFF        Compliance 0 %    Assessment: No usage but has account in [employer]     Patient has the following upcoming sleep appts:      Replacement device: No  STM ordered by provider: Yes     Total time spent on accessing and  interpreting remote patient PAP therapy data  10 minutes    Total time spent counseling, coaching  and reviewing PAP therapy data with patient  1 minutes    [id number] no

## 2023-11-28 ENCOUNTER — OFFICE VISIT (OUTPATIENT)
Dept: DERMATOLOGY | Facility: CLINIC | Age: 53
End: 2023-11-28
Payer: COMMERCIAL

## 2023-11-28 VITALS
BODY MASS INDEX: 30.87 KG/M2 | WEIGHT: 234 LBS | DIASTOLIC BLOOD PRESSURE: 99 MMHG | SYSTOLIC BLOOD PRESSURE: 159 MMHG | HEART RATE: 70 BPM

## 2023-11-28 DIAGNOSIS — L64.9 ANDROGENETIC ALOPECIA: ICD-10-CM

## 2023-11-28 DIAGNOSIS — R03.0 ELEVATED BLOOD PRESSURE READING: ICD-10-CM

## 2023-11-28 DIAGNOSIS — L66.10 LICHEN PLANOPILARIS: ICD-10-CM

## 2023-11-28 DIAGNOSIS — R20.8 DYSESTHESIA OF SCALP: Primary | ICD-10-CM

## 2023-11-28 PROCEDURE — 99213 OFFICE O/P EST LOW 20 MIN: CPT | Performed by: DERMATOLOGY

## 2023-11-28 RX ORDER — FINASTERIDE 5 MG/1
TABLET, FILM COATED ORAL
Qty: 90 TABLET | Refills: 0 | Status: SHIPPED | OUTPATIENT
Start: 2023-11-28 | End: 2023-12-05

## 2023-11-28 ASSESSMENT — PAIN SCALES - GENERAL: PAINLEVEL: NO PAIN (0)

## 2023-11-28 NOTE — PROGRESS NOTES
Dermatology Rooming Note    Ricky Epstein's goals for this visit include:   Chief Complaint   Patient presents with    Hair Loss     Return HL -  patient reports things have been going well.         - Does patient need refills? Yes, dutasteride 0.5 MG    - Last skin check was N/A    Taylor Padilla

## 2023-11-28 NOTE — PROGRESS NOTES
[facility] Dermatology Note  Encounter Date: Nov 28, 2023  Office Visit     Dermatology Problem List:  1. 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 Mast cell activation syndrome, previously followed with Dr Horta:  - Negative for c-kit mutation on genetic testing  - Current tx: silica extract   - Past tx: topical cromolyn, stopped taking early August, collagen peptides, multivitamins, antihistamines, and biotin supplements  3. L anterior lower extremity cyst, s/p excision 11/23/19  4. 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 Mild scale present on exam today. Improved from prior.  - Continue dutasteride 0.5 mg daily.  - Continue ketoconazole shampoo alternated with H&S  - Continue silica extracts, patient reports response of his mast cell activation syndrome and scalp symptoms with these  - Topical Gabapentin 6% solution 2 ml daily  - Continue his preference for a mixed combination of crushed finasteride with topical minoxidil      # Anxiety. Patient recently stopped and Effexor and Xanax.     Procedures Performed:   None    Follow-up: 4 month(s) phone, or earlier for new or changing lesions    Staff and Scribe:    Scribe Disclosure:   Donny ALLAN, am serving as a scribe to document services personally performed by Alicia  Penny Gan MD based on data collection and the provider's statements to me.     Provider Disclosure:   The documentation recorded by the scribe accurately reflects the services I personally performed and the decisions made by me.    Alicia Gan MD  Professor and Chair  Department of Dermatology  [facility]: Phone: [phone], Fax:[phone]  [facility]: Phone: [phone], Fax: [phone]            ____________________________________________    CC: Hair Loss (Return HL -  patient reports things have been going well. )    HPI:  Mr. Ricky Epstein is a 53 year old male who presents as a return patient for follow-up of hair loss, diagnosed as LPP.     - Last seen in-clinic on 12/05/2022  - Shedding or thinning, or both: Pt notices shedding mainly  - Current tx: see above  - If using Rogaine, 1 cannister lasts how long: N/A  - Scalp or hair care habits/products: Aloe Vera without alcohol, liquid hairspray. Shampoos his hair daily.     Yes Any new medications, supplements, or products? (please list below)     No Scalp pain   No Scalp burning   Very mild Scalp itching    No Eyebrow changes    No Eyelash changes   No Beard changes    No Other body hair changes    No Nail changes    No Additional symptoms? (please list below)     - Overall course:    When he returned from [place] in early 2023, he took an allegra and started feeling lightheaded with chest pain and high BP.  After evaluation by a cardiologist, he is now on a diuretic, losarten and a beta blocker.  Pt reports he is prediabetic.       - COVID status: no known infection    Patient is otherwise feeling well, in usual state of health, and has no additional skin concerns today.         Labs:  none    Physical Exam:  Vitals: BP (!) 159/99 (BP Location: Right arm, Patient Position: Sitting)   Pulse 70   Wt 106.1 kg (234 lb)   BMI  30.87 kg/m     GEN: Well developed, well-nourished, in no acute distress, in a pleasant mood.    SKIN: Focused examination of face and scalp was performed.  - Jurgen part width of 1.1 anteriorly, 1.2 posteriorly  - The layers of hair regrowth layers were noted to be  - 1.2 cm for the first  - 3-4cm for the second layer    -No diffuse erythema   - Mild perifollicular erythema  - Mild perifollicular scale   - In comparison to prior photographs, stable to improved in some areas  - No other lesions of concern on areas examined.                             Medications:  Current Outpatient Medications   Medication    chlorthalidone (HYGROTON) 25 MG tablet    clobetasol propionate (CLOBEX) 0.05 % external shampoo    dutasteride (AVODART) 0.5 MG capsule    finasteride (PROSCAR) 5 MG tablet    ketoconazole (NIZORAL) 2 % external shampoo    losartan (COZAAR) 50 MG tablet    metoprolol tartrate (LOPRESSOR) 25 MG tablet    oxybutynin (DITROPAN) 5 MG tablet    potassium chloride ER (KLOR-CON M) 20 MEQ CR tablet    Pyrithione Zinc 2 % SHAM    Tadalafil (CIALIS PO)    terbinafine (LAMISIL) 250 MG tablet    UNABLE TO FIND     No current facility-administered medications for this visit.      Past Medical History:   Patient Active Problem List   Diagnosis    Dermatitis    Onycholysis of toenail    Dysesthesia    Lichen plano-pilaris    Pruritus of scalp    Idiopathic mast cell activation syndrome (H24)    Sinus tachycardia    Feeling of chest tightness    Benign essential hypertension     No past medical history on file.    CC Chad Penaloza MD  [address] [address] on close of this encounter.

## 2023-11-28 NOTE — LETTER
11/28/2023         RE: Ricky MERY Epstein  [address] [place] [address] [address] [place] [address]        Dear Colleague,    Thank you for referring your patient, Ricky Epstein, to the [facility]. Please see a copy of my visit note below.    Dermatology Rooming Note    Ricky Epstein's goals for this visit include:   Chief Complaint   Patient presents with     Hair Loss     Return HL -  patient reports things have been going well.         - Does patient need refills? Yes, dutasteride 0.5 MG    - Last skin check was N/A    Taylor Padilla      [facility] Dermatology Note  Encounter Date: Nov 28, 2023  Office Visit     Dermatology Problem List:  1. 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 Mast cell activation syndrome, previously followed with Dr Horta:  - Negative for c-kit mutation on genetic testing  - Current tx: silica extract   - Past tx: topical cromolyn, stopped taking early August, collagen peptides, multivitamins, antihistamines, and biotin supplements  3. L anterior lower extremity cyst, s/p excision 11/23/19  4. 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 Mild scale present on exam today. Improved from prior.  - Continue dutasteride 0.5 mg daily.  - Continue ketoconazole shampoo alternated with H&S  - Continue  silica extracts, patient reports response of his mast cell activation syndrome and scalp symptoms with these  - Topical Gabapentin 6% solution 2 ml daily  - Continue his preference for a mixed combination of crushed finasteride with topical minoxidil      # Anxiety. Patient recently stopped and Effexor and Xanax.     Procedures Performed:   None    Follow-up: 4 month(s) phone, or earlier for new or changing lesions    Staff and Scribe:    Scribe Disclosure:   I, Donny Pal, am serving as a scribe to document services personally performed by Alicia Gan MD based on data collection and the provider's statements to me.     Provider Disclosure:   The documentation recorded by the scribe accurately reflects the services I personally performed and the decisions made by me.    Alicia Gan MD  Professor and Chair  Department of Dermatology  [facility]: Phone: [phone], Fax:[phone]  [facility]: Phone: [phone], Fax: [phone]            ____________________________________________    CC: Hair Loss (Return HL -  patient reports things have been going well. )    HPI:  Mr. Ricky Epstein is a 53 year old male who presents as a return patient for follow-up of hair loss, diagnosed as LPP.     - Last seen in-clinic on 12/05/2022  - Shedding or thinning, or both: Pt notices shedding mainly  - Current tx: see above  - If using Rogaine, 1 cannister lasts how long: N/A  - Scalp or hair care habits/products: Aloe Vera without alcohol, liquid hairspray. Shampoos his hair daily.     Yes Any new medications, supplements, or products? (please list below)     No Scalp pain   No Scalp burning   Very mild Scalp itching    No Eyebrow changes    No Eyelash changes   No Beard changes    No Other body hair changes    No Nail changes    No Additional symptoms? (please list below)     - Overall  course:    When he returned from [place] in early 2023, he took an allegra and started feeling lightheaded with chest pain and high BP.  After evaluation by a cardiologist, he is now on a diuretic, losarten and a beta blocker.  Pt reports he is prediabetic.       - COVID status: no known infection    Patient is otherwise feeling well, in usual state of health, and has no additional skin concerns today.         Labs:  none    Physical Exam:  Vitals: BP (!) 159/99 (BP Location: Right arm, Patient Position: Sitting)   Pulse 70   Wt 106.1 kg (234 lb)   BMI 30.87 kg/m     GEN: Well developed, well-nourished, in no acute distress, in a pleasant mood.    SKIN: Focused examination of face and scalp was performed.  - Jurgen part width of 1.1 anteriorly, 1.2 posteriorly  - The layers of hair regrowth layers were noted to be  - 1.2 cm for the first  - 3-4cm for the second layer    -No diffuse erythema   - Mild perifollicular erythema  - Mild perifollicular scale   - In comparison to prior photographs, stable to improved in some areas  - No other lesions of concern on areas examined.                             Medications:  Current Outpatient Medications   Medication     chlorthalidone (HYGROTON) 25 MG tablet     clobetasol propionate (CLOBEX) 0.05 % external shampoo     dutasteride (AVODART) 0.5 MG capsule     finasteride (PROSCAR) 5 MG tablet     ketoconazole (NIZORAL) 2 % external shampoo     losartan (COZAAR) 50 MG tablet     metoprolol tartrate (LOPRESSOR) 25 MG tablet     oxybutynin (DITROPAN) 5 MG tablet     potassium chloride ER (KLOR-CON M) 20 MEQ CR tablet     Pyrithione Zinc 2 % SHAM     Tadalafil (CIALIS PO)     terbinafine (LAMISIL) 250 MG tablet     UNABLE TO FIND     No current facility-administered medications for this visit.      Past Medical History:   Patient Active Problem List   Diagnosis     Dermatitis     Onycholysis of toenail     Dysesthesia     Lichen plano-pilaris     Pruritus of scalp      Idiopathic mast cell activation syndrome (H24)     Sinus tachycardia     Feeling of chest tightness     Benign essential hypertension     No past medical history on file.    CC Chad Penaloza MD  [address] [address] on close of this encounter.      Again, thank you for allowing me to participate in the care of your patient.        Sincerely,        Alicia Gan MD

## 2023-12-01 ENCOUNTER — HOSPITAL ENCOUNTER (OUTPATIENT)
Dept: ULTRASOUND IMAGING | Facility: CLINIC | Age: 53
Discharge: HOME | End: 2023-12-01
Attending: INTERNAL MEDICINE | Admitting: INTERNAL MEDICINE
Payer: COMMERCIAL

## 2023-12-01 DIAGNOSIS — R89.9 ABNORMAL LABORATORY TEST RESULT: ICD-10-CM

## 2023-12-01 DIAGNOSIS — I10 BENIGN ESSENTIAL HYPERTENSION: ICD-10-CM

## 2023-12-01 PROCEDURE — 76770 US EXAM ABDO BACK WALL COMP: CPT

## 2023-12-01 PROCEDURE — 93975 VASCULAR STUDY: CPT

## 2023-12-05 ENCOUNTER — TELEPHONE (OUTPATIENT)
Dept: DERMATOLOGY | Facility: CLINIC | Age: 53
End: 2023-12-05
Payer: COMMERCIAL

## 2023-12-05 DIAGNOSIS — L66.10 LICHEN PLANOPILARIS: ICD-10-CM

## 2023-12-05 DIAGNOSIS — L64.9 ANDROGENETIC ALOPECIA: ICD-10-CM

## 2023-12-05 RX ORDER — FINASTERIDE 5 MG/1
TABLET, FILM COATED ORAL
Qty: 90 TABLET | Refills: 0 | Status: SHIPPED | OUTPATIENT
Start: 2023-12-05 | End: 2024-07-12

## 2023-12-05 NOTE — TELEPHONE ENCOUNTER
[facility] Call Center    Phone Message    May a detailed message be left on voicemail: no     Reason for Call: Medication Question or concern regarding medication   Prescription Clarification  Name of Medication: finasteride (PROSCAR) 5 MG tablet  Prescribing Provider: Malik   Pharmacy: [facility]    What on the order needs clarification? Medication question for clarification       Action Taken: Message routed to:  [facility] ([facility] Derm    Travel Screening: Not Applicable

## 2023-12-05 NOTE — TELEPHONE ENCOUNTER
I spoke with pharmacy.  They were notified from patient that he is crushing the finasteride and applying it topically with his Rogaine.  The order they received stated he should be taking it orally.    I will review with Dr. Gan and call pharmacy back.  Arianne Mejia RN

## 2023-12-05 NOTE — TELEPHONE ENCOUNTER
I spoke with Dr. Gan and she confirmed it is ok for patient to crush finasteride tablet and mix with Rogaine and apply to scalp daily.    I spoke with Sonia, HanD at [facility] and notified her.  Updated Rx sent.    Arianne Mejia RN

## 2023-12-07 ENCOUNTER — MYC MEDICAL ADVICE (OUTPATIENT)
Dept: DERMATOLOGY | Facility: CLINIC | Age: 53
End: 2023-12-07
Payer: COMMERCIAL

## 2023-12-11 ENCOUNTER — MYC MEDICAL ADVICE (OUTPATIENT)
Dept: CARDIOLOGY | Facility: CLINIC | Age: 53
End: 2023-12-11
Payer: COMMERCIAL

## 2023-12-11 DIAGNOSIS — I10 BENIGN ESSENTIAL HYPERTENSION: Primary | ICD-10-CM

## 2023-12-12 NOTE — TELEPHONE ENCOUNTER
Requested via [employer] message.  -ral    ----- Message -----  From: Sven Polanco MD  Sent: 12/12/2023   7:14 AM CST  To: Carlineabbey Alvarez  Subject: FW: Blood pressure a little high                 Carline,    Could you get a couple examples of what his blood pressure is at home?  Also would like to know exactly what he is taking for his hypertension at this time.    Thanks,    Sven

## 2023-12-14 RX ORDER — AMLODIPINE BESYLATE 5 MG/1
5 TABLET ORAL DAILY
Qty: 90 TABLET | Refills: 1 | Status: SHIPPED | OUTPATIENT
Start: 2023-12-14 | End: 2024-01-19

## 2023-12-14 NOTE — TELEPHONE ENCOUNTER
Recommendations discussed with pt.  Pt verbalized understanding and had no questions.  Educated on how to properly check blood pressure.  Education on hidden salt in diet.  Recommended pt exercise.    Amlodipine sent to pharm.  -ral

## 2023-12-14 NOTE — TELEPHONE ENCOUNTER
LVM to call for recommendations.  -Sven Ndiaye MD  You2 days ago     MARITZA Juareze,    If he has not been on amlodipine could start 5 mg a day.  Call us with some blood pressure readings.    Thanks,    Sven

## 2024-01-19 ENCOUNTER — MYC MEDICAL ADVICE (OUTPATIENT)
Dept: CARDIOLOGY | Facility: CLINIC | Age: 54
End: 2024-01-19
Payer: COMMERCIAL

## 2024-01-19 ENCOUNTER — MEDICAL CORRESPONDENCE (OUTPATIENT)
Dept: HEALTH INFORMATION MANAGEMENT | Facility: CLINIC | Age: 54
End: 2024-01-19
Payer: COMMERCIAL

## 2024-01-19 DIAGNOSIS — I10 BENIGN ESSENTIAL HYPERTENSION: ICD-10-CM

## 2024-01-19 RX ORDER — AMLODIPINE BESYLATE 5 MG/1
5 TABLET ORAL 2 TIMES DAILY
Qty: 180 TABLET | Refills: 1 | Status: SHIPPED | OUTPATIENT
Start: 2024-01-19 | End: 2024-05-29

## 2024-01-19 NOTE — TELEPHONE ENCOUNTER
----- Message -----  From: Sven Polanco MD  Sent: 1/19/2024  12:21 PM CST  To: Carline Alvarez  Subject: FW: Can I take Amlodipine Besylate twice a d*    OK to take amlodipine twicw daily  ----- Message -----  From: Ricky Epstein  Sent: 1/19/2024   8:24 AM CST  To: Sven Polanco MD  Subject: Can I take Amlodipine Besylate twice a day       Thank you, Carline.

## 2024-01-23 ENCOUNTER — TRANSCRIBE ORDERS (OUTPATIENT)
Dept: OTHER | Age: 54
End: 2024-01-23

## 2024-01-23 DIAGNOSIS — E87.6 HYPOKALEMIA: ICD-10-CM

## 2024-01-23 DIAGNOSIS — D89.40 MAST CELL ACTIVATION (H): Primary | ICD-10-CM

## 2024-01-23 RX ORDER — POTASSIUM CHLORIDE 1500 MG/1
20 TABLET, EXTENDED RELEASE ORAL DAILY
Qty: 90 TABLET | Refills: 1 | Status: SHIPPED | OUTPATIENT
Start: 2024-01-23 | End: 2024-07-12

## 2024-02-28 NOTE — TELEPHONE ENCOUNTER
FUTURE VISIT INFORMATION      FUTURE VISIT INFORMATION:  Date: 2.29.24  Time: 1:50  [place] [facility] [place]FORMATION:  Referring provider:  Casie  Referring providers clinic:  [facility]  Reason for visit/diagnosis  Mast cell activation - Referred by Darwin Jason PA-C at [facility] in [place] (Phone: [phone], Fax: [phone]) - Recs in Epic - Recs with [facility] - per Pt Ricky      RECORDS REQUESTED FROM:       Clinic name Comments Records Status Imaging Status   Derm 11.28.23, 12.5.22  [facility]    [facility] 9.15.23  [facility]    6.9.23  Casie Received    Cardiology 2.17.23  [facility]

## 2024-02-29 ENCOUNTER — PRE VISIT (OUTPATIENT)
Dept: DERMATOLOGY | Facility: CLINIC | Age: 54
End: 2024-02-29

## 2024-02-29 ENCOUNTER — OFFICE VISIT (OUTPATIENT)
Dept: DERMATOLOGY | Facility: CLINIC | Age: 54
End: 2024-02-29
Attending: PHYSICIAN ASSISTANT
Payer: COMMERCIAL

## 2024-02-29 DIAGNOSIS — D89.40 MAST CELL ACTIVATION (H): Primary | ICD-10-CM

## 2024-02-29 PROCEDURE — 99213 OFFICE O/P EST LOW 20 MIN: CPT | Performed by: DERMATOLOGY

## 2024-02-29 NOTE — PROGRESS NOTES
[facility] Dermatology Note  Encounter Date: Feb 29, 2024  Office Visit     Dermatology Problem List:  1. 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 Mast cell activation syndrome, previously followed with Dr Horta:  - Negative for c-kit mutation on genetic testing  - Current tx: silica extract   - Past tx: topical cromolyn, stopped taking early August, collagen peptides, multivitamins, antihistamines, and biotin supplements  3. L anterior lower extremity cyst, s/p excision 11/23/19  4. Elevated blood pressure reading today 11.28/23 - continue to follow-up with primary care  ____________________________________________    Assessment & Plan:     1. Mast Cell Activation Syndrome. Well controlled on current regimen. We discussed potential next steps in treatment if his symptoms change, including Omalizumab, Dupilumab, J CARLOS Inhibitor, Dapsone. At this time, no indication to change treatment.      Procedures Performed:   None    Follow-up: PRN    Staff and Scribe:    Scribe Disclosure:   By signing my name below, I, Ara Péreznaseem, attest that this documentation has been prepared under the direction and in the presence of Oliver Shrestha MD.  - Electronically Signed: Ara Sanders 02/29/24       Provider Disclosure:   The documentation recorded by the scribe accurately reflects the services I personally performed and the decisions made by me.    Oliver Shrestha MD   of Dermatology  Department of  Dermatology  [employer]      ____________________________________________    CC: Derm Problem (Mast cell activation syndrome/Scalp: stable right now//)    HPI:  Mr. Ricky Epstein is a 53 year old male who presents as a return patient for follow-up of hair loss, diagnosed as LPP.     Patient is a previous patient of Dr. Gan, last seen 11/28/2023. Previously took Cellcept and noted heavy side effects.    Patient is otherwise feeling well, in usual state of health, and has no additional skin concerns today.       Labs:  none    Physical exam:  Vitals: There were no vitals taken for this visit.  GEN: This is a well developed, well-nourished male in no acute distress, in a pleasant mood.    SKIN: Irwin phototype III  - Focused examination of the face was performed.  - no active rash  - No other lesions of concern on areas examined.           Medications:  Current Outpatient Medications   Medication    amLODIPine (NORVASC) 5 MG tablet    clobetasol propionate (CLOBEX) 0.05 % external shampoo    dutasteride (AVODART) 0.5 MG capsule    finasteride (PROSCAR) 5 MG tablet    losartan (COZAAR) 50 MG tablet    metoprolol tartrate (LOPRESSOR) 25 MG tablet    oxybutynin (DITROPAN) 5 MG tablet    potassium chloride ER (KLOR-CON M) 20 MEQ CR tablet    Pyrithione Zinc 2 % SHAM    Tadalafil (CIALIS PO)    terbinafine (LAMISIL) 250 MG tablet    UNABLE TO FIND    gabapentin 6 % SOLN solution    ketoconazole (NIZORAL) 2 % external shampoo     No current facility-administered medications for this visit.      Past Medical History:   Patient Active Problem List   Diagnosis    Dermatitis    Onycholysis of toenail    Dysesthesia    Lichen plano-pilaris    Pruritus of scalp    Idiopathic mast cell activation syndrome (H24)    Sinus tachycardia    Feeling of chest tightness    Benign essential hypertension     No past medical history on file.    CC Chad Penaloza MD  [facility] [place]  [address]  [place] [address] on close of this encounter.

## 2024-02-29 NOTE — NURSING NOTE
Dermatology Rooming Note    Ricky Epstein's goals for this visit include:   Chief Complaint   Patient presents with    Derm Problem     Mast cell activation  Scalp: stable right now         Radha Segal LPN

## 2024-03-04 DIAGNOSIS — I10 BENIGN ESSENTIAL HYPERTENSION: ICD-10-CM

## 2024-03-06 RX ORDER — LOSARTAN POTASSIUM 50 MG/1
50 TABLET ORAL 2 TIMES DAILY
Qty: 60 TABLET | Refills: 3 | Status: SHIPPED | OUTPATIENT
Start: 2024-03-06 | End: 2024-09-13 | Stop reason: SINTOL

## 2024-03-21 ENCOUNTER — TRANSFERRED RECORDS (OUTPATIENT)
Dept: HEALTH INFORMATION MANAGEMENT | Facility: CLINIC | Age: 54
End: 2024-03-21

## 2024-03-21 ENCOUNTER — LAB REQUISITION (OUTPATIENT)
Dept: LAB | Facility: CLINIC | Age: 54
End: 2024-03-21

## 2024-03-21 DIAGNOSIS — Z79.899 OTHER LONG TERM (CURRENT) DRUG THERAPY: ICD-10-CM

## 2024-03-21 PROCEDURE — 80053 COMPREHEN METABOLIC PANEL: CPT | Performed by: PHYSICIAN ASSISTANT

## 2024-03-22 LAB
ALBUMIN SERPL BCG-MCNC: 4.4 G/DL (ref 3.5–5.2)
ALP SERPL-CCNC: 109 U/L (ref 40–150)
ALT SERPL W P-5'-P-CCNC: 41 U/L (ref 0–70)
ANION GAP SERPL CALCULATED.3IONS-SCNC: 11 MMOL/L (ref 7–15)
AST SERPL W P-5'-P-CCNC: 27 U/L (ref 0–45)
BILIRUB SERPL-MCNC: 0.3 MG/DL
BUN SERPL-MCNC: 21 MG/DL (ref 6–20)
CALCIUM SERPL-MCNC: 9.4 MG/DL (ref 8.6–10)
CHLORIDE SERPL-SCNC: 103 MMOL/L (ref 98–107)
CREAT SERPL-MCNC: 1.31 MG/DL (ref 0.67–1.17)
DEPRECATED HCO3 PLAS-SCNC: 24 MMOL/L (ref 22–29)
EGFRCR SERPLBLD CKD-EPI 2021: 64 ML/MIN/1.73M2
GLUCOSE SERPL-MCNC: 98 MG/DL (ref 70–99)
POTASSIUM SERPL-SCNC: 4.2 MMOL/L (ref 3.4–5.3)
PROT SERPL-MCNC: 6.6 G/DL (ref 6.4–8.3)
SODIUM SERPL-SCNC: 138 MMOL/L (ref 135–145)

## 2024-03-29 ENCOUNTER — VIRTUAL VISIT (OUTPATIENT)
Dept: DERMATOLOGY | Facility: CLINIC | Age: 54
End: 2024-03-29
Payer: COMMERCIAL

## 2024-03-29 DIAGNOSIS — L64.9 ANDROGENETIC ALOPECIA: ICD-10-CM

## 2024-03-29 DIAGNOSIS — R20.8 DYSESTHESIA OF SCALP: Primary | ICD-10-CM

## 2024-03-29 DIAGNOSIS — L66.10 LICHEN PLANOPILARIS: ICD-10-CM

## 2024-03-29 DIAGNOSIS — D89.40 MAST CELL ACTIVATION (H): ICD-10-CM

## 2024-03-29 PROCEDURE — 99441 PR PHYSICIAN TELEPHONE EVALUATION 5-10 MIN: CPT | Mod: 93 | Performed by: DERMATOLOGY

## 2024-03-29 RX ORDER — DUTASTERIDE 0.5 MG/1
CAPSULE, LIQUID FILLED ORAL
Qty: 90 CAPSULE | Refills: 3 | Status: SHIPPED | OUTPATIENT
Start: 2024-03-29

## 2024-03-29 NOTE — LETTER
3/29/2024       RE: Ricky Epstein  [address] [place] [address] [address] [place] [address]     Dear Colleague,    Thank you for referring your patient, Ricky Epstein, to the [facility] [place] at [facility]. Please see a copy of my visit note below.    [facility] Dermatology Note  Encounter Date: Mar 29, 2024  Telephone ([phone]). Location of teledermatologist: [facility] [place]. Start time: 8:15. End time: 8:25.    Dermatology Problem List:  1. 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 Mast cell activation syndrome, previously followed with Dr Horta:  - Negative for c-kit mutation on genetic testing  - Current tx: silica extract   - Past tx: topical cromolyn, stopped taking early August, collagen peptides, multivitamins, antihistamines, and biotin supplements  3. L anterior lower extremity cyst, s/p excision 11/23/19  4. Elevated blood pressure reading in past  - 11.28/23 - continue to follow-up with primary care    ____________________________________________    Assessment & Plan:     # Lichen planopilaris  With a component of androgenetic alopecia. He is extremely happy with where things are today and noticing alvarado hair and minimal scalp symptoms. Objectivethe scalp appears less pink.  - Continue dutasteride 0.5 mg daily.ly   - Continue ketoconazole shampoo alternating with H&S  - OK to continue vitamin D. Decrease to 1 pill (5000U) daily. Get  "vitamin D checked next lab draw in a couple of months to r/o hypervitaminosis D (was taking 31601X daily)  - Topical Gabapentin 6% solution 2 ml daily  - Continue his preference for a mixed combination of crushed finasteride with topical minoxidil    # Mast cell activation  Longstanding and well controlled with his home/OTC regimen. Saw Dr. Shrestha 2/2024, regimen was not changed.  - Continue silica extracts (patient reports response of his mast cell activation syndrome and scalp symptoms with these)    Procedures Performed:    None    Follow-up in person as scheduled 12/2024, sooner PRN.    Staff and Resident:     Yadira Barton MD  Dermatology Resident PGY4    Patient was seen and examined with the dermatology resident. I agree with the history, review of systems,, assessments and plan.    Alicia Gan MD  Professor   Department of Dermatology  [facility]   ____________________________________________    CC: Hair Loss (Scalp and hair are stable/Patient states he feels \"pretty good\")    HPI:  Mr. Ricky Epstein is a(n) 54 year old male who presents today as a return patient for LPP. Since the last visit, he is doing very well. His hair thinning has stabilized and he even appreciates more fullness. Eyebrows are fine. Scalp symptoms are also well controlled. The mainstays of his treatment right now are H&S shampoo with occasional ketoconazole shampoo and dutasteride. He doesn't really use topical minoxidil or gabapentin solution because they aren't helpful. He notes his BP is under good control as of late too. His health is otherwise unchanged. He did start taking a vitamin D supplement recently, taking about 15,000U daily. He is very happy where things are today.     Patient is otherwise feeling well, without additional skin concerns.    Labs Reviewed:  N/A    Physical Exam:  Vitals: There were no vitals taken for this visit.  SKIN: Teledermatology photos were reviewed; image quality and " interpretability: acceptable. Image date: 3/28/24  - Mild background pinkness on the scalp. Compared to prior photos, this is improved  - Overall alvarado hair density  - No other lesions of concern on areas examined.     Medications:  Current Outpatient Medications   Medication     amLODIPine (NORVASC) 5 MG tablet     clobetasol propionate (CLOBEX) 0.05 % external shampoo     dutasteride (AVODART) 0.5 MG capsule     finasteride (PROSCAR) 5 MG tablet     losartan (COZAAR) 50 MG tablet     metoprolol tartrate (LOPRESSOR) 25 MG tablet     oxybutynin (DITROPAN) 5 MG tablet     potassium chloride ER (KLOR-CON M) 20 MEQ CR tablet     Tadalafil (CIALIS PO)     terbinafine (LAMISIL) 250 MG tablet     UNABLE TO FIND     gabapentin 6 % SOLN solution     ketoconazole (NIZORAL) 2 % external shampoo     Pyrithione Zinc 2 % SHAM     No current facility-administered medications for this visit.      Past Medical/Surgical History:   Patient Active Problem List   Diagnosis     Dermatitis     Onycholysis of toenail     Dysesthesia     Lichen plano-pilaris     Pruritus of scalp     Idiopathic mast cell activation syndrome (H24)     Sinus tachycardia     Feeling of chest tightness     Benign essential hypertension     No past medical history on file.    CC No referring provider defined for this encounter. on close of this encounter.      Again, thank you for allowing me to participate in the care of your patient.      Sincerely,    Alicia Gan MD

## 2024-03-29 NOTE — NURSING NOTE
"Dermatology Rooming Note    Ricky Epstein's goals for this visit include:   Chief Complaint   Patient presents with    Hair Loss     Scalp and hair are stable  Patient states he feels \"pretty good\"     Radha Segal LPN    "

## 2024-03-29 NOTE — PROGRESS NOTES
[facility] Dermatology Note  Encounter Date: Mar 29, 2024  Telephone ([phone]). Location of teledermatologist: [facility] [place]. Start time: 8:15. End time: 8:25.    Dermatology Problem List:  1. 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 Mast cell activation syndrome, previously followed with Dr Horta:  - Negative for c-kit mutation on genetic testing  - Current tx: silica extract   - Past tx: topical cromolyn, stopped taking early August, collagen peptides, multivitamins, antihistamines, and biotin supplements  3. L anterior lower extremity cyst, s/p excision 11/23/19  4. Elevated blood pressure reading in past  - 11.28/23 - continue to follow-up with primary care    ____________________________________________    Assessment & Plan:     # Lichen planopilaris  With a component of androgenetic alopecia. He is extremely happy with where things are today and noticing alvarado hair and minimal scalp symptoms. Objectivethe scalp appears less pink.  - Continue dutasteride 0.5 mg daily.ly   - Continue ketoconazole shampoo alternating with H&S  - OK to continue vitamin D. Decrease to 1 pill (5000U) daily. Get vitamin D checked next lab draw in a couple of months to r/o hypervitaminosis D (was taking 92420I daily)  - Topical Gabapentin 6% solution 2 ml daily  - Continue his preference for a mixed combination of crushed finasteride with topical minoxidil    # Mast cell activation  Longstanding and well controlled with his  "home/OTC regimen. Saw Dr. Shrestha 2/2024, regimen was not changed.  - Continue silica extracts (patient reports response of his mast cell activation syndrome and scalp symptoms with these)    Procedures Performed:    None    Follow-up in person as scheduled 12/2024, sooner PRN.    Staff and Resident:     Yadira Barton MD  Dermatology Resident PGY4    Patient was seen and examined with the dermatology resident. I agree with the history, review of systems,, assessments and plan.    Alicia Gan MD  Professor   Department of Dermatology  [facility]   ____________________________________________    CC: Hair Loss (Scalp and hair are stable/Patient states he feels \"pretty good\")    HPI:  Mr. Ricky Epstein is a(n) 54 year old male who presents today as a return patient for LPP. Since the last visit, he is doing very well. His hair thinning has stabilized and he even appreciates more fullness. Eyebrows are fine. Scalp symptoms are also well controlled. The mainstays of his treatment right now are H&S shampoo with occasional ketoconazole shampoo and dutasteride. He doesn't really use topical minoxidil or gabapentin solution because they aren't helpful. He notes his BP is under good control as of late too. His health is otherwise unchanged. He did start taking a vitamin D supplement recently, taking about 15,000U daily. He is very happy where things are today.     Patient is otherwise feeling well, without additional skin concerns.    Labs Reviewed:  N/A    Physical Exam:  Vitals: There were no vitals taken for this visit.  SKIN: Teledermatology photos were reviewed; image quality and interpretability: acceptable. Image date: 3/28/24  - Mild background pinkness on the scalp. Compared to prior photos, this is improved  - Overall alvarado hair density  - No other lesions of concern on areas examined.     Medications:  Current Outpatient Medications   Medication    amLODIPine (NORVASC) 5 MG tablet    clobetasol " propionate (CLOBEX) 0.05 % external shampoo    dutasteride (AVODART) 0.5 MG capsule    finasteride (PROSCAR) 5 MG tablet    losartan (COZAAR) 50 MG tablet    metoprolol tartrate (LOPRESSOR) 25 MG tablet    oxybutynin (DITROPAN) 5 MG tablet    potassium chloride ER (KLOR-CON M) 20 MEQ CR tablet    Tadalafil (CIALIS PO)    terbinafine (LAMISIL) 250 MG tablet    UNABLE TO FIND    gabapentin 6 % SOLN solution    ketoconazole (NIZORAL) 2 % external shampoo    Pyrithione Zinc 2 % SHAM     No current facility-administered medications for this visit.      Past Medical/Surgical History:   Patient Active Problem List   Diagnosis    Dermatitis    Onycholysis of toenail    Dysesthesia    Lichen plano-pilaris    Pruritus of scalp    Idiopathic mast cell activation syndrome (H24)    Sinus tachycardia    Feeling of chest tightness    Benign essential hypertension     No past medical history on file.    CC No referring provider defined for this encounter. on close of this encounter.

## 2024-05-26 DIAGNOSIS — I10 BENIGN ESSENTIAL HYPERTENSION: ICD-10-CM

## 2024-05-29 RX ORDER — AMLODIPINE BESYLATE 5 MG/1
5 TABLET ORAL DAILY
Qty: 90 TABLET | Refills: 0 | Status: SHIPPED | OUTPATIENT
Start: 2024-05-29

## 2024-07-02 DIAGNOSIS — I10 BENIGN ESSENTIAL HYPERTENSION: ICD-10-CM

## 2024-07-02 RX ORDER — AMLODIPINE BESYLATE 10 MG/1
TABLET ORAL
Qty: 90 TABLET | Refills: 0 | Status: SHIPPED | OUTPATIENT
Start: 2024-07-02 | End: 2024-07-12 | Stop reason: DRUGHIGH

## 2024-07-07 ENCOUNTER — HEALTH MAINTENANCE LETTER (OUTPATIENT)
Age: 54
End: 2024-07-07

## 2024-07-12 ENCOUNTER — OFFICE VISIT (OUTPATIENT)
Dept: CARDIOLOGY | Facility: CLINIC | Age: 54
End: 2024-07-12
Payer: COMMERCIAL

## 2024-07-12 VITALS
RESPIRATION RATE: 16 BRPM | HEART RATE: 68 BPM | BODY MASS INDEX: 32.79 KG/M2 | DIASTOLIC BLOOD PRESSURE: 70 MMHG | SYSTOLIC BLOOD PRESSURE: 120 MMHG | WEIGHT: 247.4 LBS | HEIGHT: 73 IN

## 2024-07-12 DIAGNOSIS — I10 BENIGN ESSENTIAL HYPERTENSION: ICD-10-CM

## 2024-07-12 DIAGNOSIS — R07.89 FEELING OF CHEST TIGHTNESS: ICD-10-CM

## 2024-07-12 DIAGNOSIS — R06.83 SNORING: ICD-10-CM

## 2024-07-12 DIAGNOSIS — E87.6 HYPOKALEMIA: ICD-10-CM

## 2024-07-12 PROCEDURE — 99214 OFFICE O/P EST MOD 30 MIN: CPT | Performed by: INTERNAL MEDICINE

## 2024-07-12 RX ORDER — POTASSIUM CHLORIDE 1500 MG/1
20 TABLET, EXTENDED RELEASE ORAL DAILY
Qty: 90 TABLET | Refills: 2 | Status: SHIPPED | OUTPATIENT
Start: 2024-07-12 | End: 2024-07-12

## 2024-07-12 RX ORDER — DAPAGLIFLOZIN 5 MG/1
5 TABLET, FILM COATED ORAL DAILY
COMMUNITY
Start: 2024-03-20

## 2024-07-12 RX ORDER — OXYBUTYNIN CHLORIDE 10 MG/1
10 TABLET, EXTENDED RELEASE ORAL DAILY
COMMUNITY
Start: 2024-03-07

## 2024-07-12 RX ORDER — VARDENAFIL HYDROCHLORIDE 20 MG/1
TABLET ORAL
COMMUNITY
Start: 2023-06-09 | End: 2025-03-16

## 2024-07-12 NOTE — PROGRESS NOTES
"    [facility]  [phone]          Assessment/Recommendations   Patient with known hypertension, chest discomfort with normal coronary arteries by CT angiogram would always good control of his blood pressure, is exercising on a regular basis and has no significant side effects from his current antihypertensive regimen.    I have commended him on his exercise, and he has recently lost about 4 pounds.  I have recommended that he cut carbohydrates in his diet by 25 to 50% and substitute protein and this along with the exercise would undoubtedly result in some weight loss which he would like to do.    Will plan on seeing him back in 1 year, but of course to be happy to see him sooner if questions or problems arise.       History of Present Illness/Subjective    Mr. Ricky Epstein is a 54 year old male with known hypertension and chest discomfort.  Chest pains have been much less and as noted above he has had a normal CT coronary angiogram in the past.  He denies shortness of breath, orthopnea, paroxysmal nocturnal dyspnea, peripheral edema, syncope or near syncope and denies any palpitations as well.  Blood pressures at home have been good.  It is a rare blood pressure that is above 140 systolic.         Physical Examination Review of Systems   /70 (BP Location: Right arm, Patient Position: Sitting, Cuff Size: Adult Large)   Pulse 68   Resp 16   Ht 1.854 m (6' 1\")   Wt 112.2 kg (247 lb 6.4 oz)   BMI 32.64 kg/m    Body mass index is 32.64 kg/m .  Wt Readings from Last 3 Encounters:   07/12/24 112.2 kg (247 lb 6.4 oz)   11/28/23 106.1 kg (234 lb)   11/01/23 104.3 kg (230 lb)     General Appearance:   Alert, cooperative and in no acute distress.   ENT/Mouth: Pink/moist oral mucosa   EYES:  no scleral icterus, normal conjunctivae   Neck: JVP normal. No Hepatojugular reflux. Thyroid not visualized.   Chest/Lungs:   Lungs are clear to auscultation, equal chest wall expansion. " "  Cardiovascular:   S1, S2 without murmur ,clicks or rubs. Brachial, radial and posterior tibial pulses are intact and symetric. No carotid bruits noted   Abdomen:  Nontender.   Extremities: No cyanosis, clubbing or edema   Skin: no xanthelasma, warm.    Neurologic: normal arm movement bilateral, no tremors     Psychiatric: Appropriate affect.      Enc Vitals  BP: 120/70  Pulse: 68  Resp: 16  Weight: 112.2 kg (247 lb 6.4 oz)  Height: 185.4 cm (6' 1\")                                           Medical History  Surgical History Family History Social History   No past medical history on file. No past surgical history on file. Family History   Problem Relation Age of Onset    Cancer No family hx of         no skin cancer    Skin Cancer No family hx of     Melanoma No family hx of     Social History     Socioeconomic History    Marital status: Single     Spouse name: Not on file    Number of children: Not on file    Years of education: Not on file    Highest education level: Not on file   Occupational History    Not on file   Tobacco Use    Smoking status: Never    Smokeless tobacco: Never   Vaping Use    Vaping status: Never Used   Substance and Sexual Activity    Alcohol use: Not on file    Drug use: Not on file    Sexual activity: Not on file   Other Topics Concern    Parent/sibling w/ CABG, MI or angioplasty before 65F 55M? Not Asked   Social History Narrative    Not on file     Social Determinants of Health     Financial Resource Strain: Not on file   Food Insecurity: Not on file   Transportation Needs: Not on file   Physical Activity: Not on file   Stress: Not on file   Social Connections: Not on file   Interpersonal Safety: Not on file   Housing Stability: Not on file          Medications  Allergies   Current Outpatient Medications   Medication Sig Dispense Refill    amLODIPine (NORVASC) 5 MG tablet TAKE ONE TABLET BY MOUTH ONE TIME DAILY 90 tablet 0    clobetasol propionate (CLOBEX) 0.05 % external shampoo Apply " to dry scalp, let sit for 15-20 min, then lather and wash off. Alternate with head & shoulders. 118 mL 11    dutasteride (AVODART) 0.5 MG capsule TAKE ONE CAPSULE BY MOUTH ONE TIME DAILY 90 capsule 3    FARXIGA 5 MG TABS tablet Take 5 mg by mouth daily      ketoconazole (NIZORAL) 2 % external shampoo Apply to scalp, lather, massage into scalp, leave on up to 2 minutes, then rinse 120 mL 5    losartan (COZAAR) 50 MG tablet TAKE ONE TABLET BY MOUTH TWICE DAILY 60 tablet 3    metoprolol tartrate (LOPRESSOR) 25 MG tablet Take 2 tablets (50 mg) by mouth 2 times daily 60 tablet 3    oxyBUTYnin ER (DITROPAN XL) 10 MG 24 hr tablet Take 10 mg by mouth daily      Pyrithione Zinc 2 % SHAM Externally apply topically daily      Tadalafil (CIALIS PO) Take 5 mg by mouth as needed      UNABLE TO FIND 1 teaspoonful daily MEDICATION NAME: Diatomaceous Earth      vardenafil (LEVITRA) 20 MG tablet 1/2 to 1 tablet 60 minutes before sexual activity as needed Orally no more than once a day for 30 days      Allergies   Allergen Reactions    Alprazolam      Other reaction(s): activated is mast cell    Penicillins Rash     Other reaction(s): Edema         Lab Results    Chemistry/lipid CBC Cardiac Enzymes/BNP/TSH/INR   Lab Results   Component Value Date    CHOL 150 06/09/2023    HDL 48 06/09/2023    TRIG 70 06/09/2023    BUN 21.0 (H) 03/21/2024     03/21/2024    CO2 24 03/21/2024    Lab Results   Component Value Date    WBC 7.8 01/19/2023    HGB 15.3 01/19/2023    HCT 44.8 01/19/2023    MCV 89 01/19/2023     01/19/2023    Lab Results   Component Value Date    TROPONINI 0.01 01/19/2023    TSH 0.44 01/19/2023    INR 1.01 01/19/2023

## 2024-07-12 NOTE — LETTER
"7/12/2024    [facility] ([facility]3]  [address] [address]    RE: Ricky Epstein       Dear Colleague,     I had the pleasure of seeing Ricky Epstein in the [facility].      [facility]  [phone]          Assessment/Recommendations   Patient with known hypertension, chest discomfort with normal coronary arteries by CT angiogram would always good control of his blood pressure, is exercising on a regular basis and has no significant side effects from his current antihypertensive regimen.    I have commended him on his exercise, and he has recently lost about 4 pounds.  I have recommended that he cut carbohydrates in his diet by 25 to 50% and substitute protein and this along with the exercise would undoubtedly result in some weight loss which he would like to do.    Will plan on seeing him back in 1 year, but of course to be happy to see him sooner if questions or problems arise.       History of Present Illness/Subjective    Mr. Ricky Epstein is a 54 year old male with known hypertension and chest discomfort.  Chest pains have been much less and as noted above he has had a normal CT coronary angiogram in the past.  He denies shortness of breath, orthopnea, paroxysmal nocturnal dyspnea, peripheral edema, syncope or near syncope and denies any palpitations as well.  Blood pressures at home have been good.  It is a rare blood pressure that is above 140 systolic.         Physical Examination Review of Systems   /70 (BP Location: Right arm, Patient Position: Sitting, Cuff Size: Adult Large)   Pulse 68   Resp 16   Ht 1.854 m (6' 1\")   Wt 112.2 kg (247 lb 6.4 oz)   BMI 32.64 kg/m    Body mass index is 32.64 kg/m .  Wt Readings from Last 3 Encounters:   07/12/24 112.2 kg (247 lb 6.4 oz)   11/28/23 106.1 kg (234 lb)   11/01/23 104.3 kg (230 lb)     General Appearance:   Alert, cooperative and in no acute distress.   ENT/Mouth: Pink/moist oral " "mucosa   EYES:  no scleral icterus, normal conjunctivae   Neck: JVP normal. No Hepatojugular reflux. Thyroid not visualized.   Chest/Lungs:   Lungs are clear to auscultation, equal chest wall expansion.   Cardiovascular:   S1, S2 without murmur ,clicks or rubs. Brachial, radial and posterior tibial pulses are intact and symetric. No carotid bruits noted   Abdomen:  Nontender.   Extremities: No cyanosis, clubbing or edema   Skin: no xanthelasma, warm.    Neurologic: normal arm movement bilateral, no tremors     Psychiatric: Appropriate affect.      Enc Vitals  BP: 120/70  Pulse: 68  Resp: 16  Weight: 112.2 kg (247 lb 6.4 oz)  Height: 185.4 cm (6' 1\")                                           Medical History  Surgical History Family History Social History   No past medical history on file. No past surgical history on file. Family History   Problem Relation Age of Onset    Cancer No family hx of         no skin cancer    Skin Cancer No family hx of     Melanoma No family hx of     Social History     Socioeconomic History    Marital status: Single     Spouse name: Not on file    Number of children: Not on file    Years of education: Not on file    Highest education level: Not on file   Occupational History    Not on file   Tobacco Use    Smoking status: Never    Smokeless tobacco: Never   Vaping Use    Vaping status: Never Used   Substance and Sexual Activity    Alcohol use: Not on file    Drug use: Not on file    Sexual activity: Not on file   Other Topics Concern    Parent/sibling w/ CABG, MI or angioplasty before 65F 55M? Not Asked   Social History Narrative    Not on file     Social Determinants of Health     Financial Resource Strain: Not on file   Food Insecurity: Not on file   Transportation Needs: Not on file   Physical Activity: Not on file   Stress: Not on file   Social Connections: Not on file   Interpersonal Safety: Not on file   Housing Stability: Not on file          Medications  Allergies   Current " Outpatient Medications   Medication Sig Dispense Refill    amLODIPine (NORVASC) 5 MG tablet TAKE ONE TABLET BY MOUTH ONE TIME DAILY 90 tablet 0    clobetasol propionate (CLOBEX) 0.05 % external shampoo Apply to dry scalp, let sit for 15-20 min, then lather and wash off. Alternate with head & shoulders. 118 mL 11    dutasteride (AVODART) 0.5 MG capsule TAKE ONE CAPSULE BY MOUTH ONE TIME DAILY 90 capsule 3    FARXIGA 5 MG TABS tablet Take 5 mg by mouth daily      ketoconazole (NIZORAL) 2 % external shampoo Apply to scalp, lather, massage into scalp, leave on up to 2 minutes, then rinse 120 mL 5    losartan (COZAAR) 50 MG tablet TAKE ONE TABLET BY MOUTH TWICE DAILY 60 tablet 3    metoprolol tartrate (LOPRESSOR) 25 MG tablet Take 2 tablets (50 mg) by mouth 2 times daily 60 tablet 3    oxyBUTYnin ER (DITROPAN XL) 10 MG 24 hr tablet Take 10 mg by mouth daily      Pyrithione Zinc 2 % SHAM Externally apply topically daily      Tadalafil (CIALIS PO) Take 5 mg by mouth as needed      UNABLE TO FIND 1 teaspoonful daily MEDICATION NAME: Diatomaceous Earth      vardenafil (LEVITRA) 20 MG tablet 1/2 to 1 tablet 60 minutes before sexual activity as needed Orally no more than once a day for 30 days      Allergies   Allergen Reactions    Alprazolam      Other reaction(s): activated is mast cell    Penicillins Rash     Other reaction(s): Edema         Lab Results    Chemistry/lipid CBC Cardiac Enzymes/BNP/TSH/INR   Lab Results   Component Value Date    CHOL 150 06/09/2023    HDL 48 06/09/2023    TRIG 70 06/09/2023    BUN 21.0 (H) 03/21/2024     03/21/2024    CO2 24 03/21/2024    Lab Results   Component Value Date    WBC 7.8 01/19/2023    HGB 15.3 01/19/2023    HCT 44.8 01/19/2023    MCV 89 01/19/2023     01/19/2023    Lab Results   Component Value Date    TROPONINI 0.01 01/19/2023    TSH 0.44 01/19/2023    INR 1.01 01/19/2023                Thank you for allowing me to participate in the care of your  patient.      Sincerely,     Sven Polanco MD     [facility]  cc:   Sven Polanco MD  [address]  [place] [address]

## 2024-09-12 ENCOUNTER — MYC MEDICAL ADVICE (OUTPATIENT)
Dept: CARDIOLOGY | Facility: CLINIC | Age: 54
End: 2024-09-12
Payer: COMMERCIAL

## 2024-09-12 DIAGNOSIS — I10 BENIGN ESSENTIAL HYPERTENSION: Primary | ICD-10-CM

## 2024-09-13 RX ORDER — LISINOPRIL 5 MG/1
5 TABLET ORAL DAILY
Qty: 30 TABLET | Refills: 1 | Status: SHIPPED | OUTPATIENT
Start: 2024-09-13

## 2024-09-13 NOTE — TELEPHONE ENCOUNTER
From: Sven Polanco MD  Sent: 9/13/2024   7:16 AM CDT  To: Carline Alvarez  Subject: FW: Can we try a different med than Losartan*    Could we discontinue losartan and try lisinopril 5 mg/day (if he has not had issues with ACE inhibitors in past).    ThanksSven

## 2024-09-13 NOTE — TELEPHONE ENCOUNTER
From: Ricky Epstein  Sent: 9/13/2024  10:13 AM CDT  To: Carline Alvarez  Subject: Can we try a different med than Losartan Pot*    aCrline Be, are there any serious side effects with lisinopril?

## 2024-09-25 DIAGNOSIS — I10 BENIGN ESSENTIAL HYPERTENSION: ICD-10-CM

## 2024-09-26 RX ORDER — AMLODIPINE BESYLATE 5 MG/1
5 TABLET ORAL 2 TIMES DAILY
Qty: 180 TABLET | Refills: 2 | Status: SHIPPED | OUTPATIENT
Start: 2024-09-26

## 2024-09-26 NOTE — TELEPHONE ENCOUNTER
Dose adjusted 7/12/24 at office visit. Current dose 5mg twice daily. Refills sent as prescribed. LMS

## 2024-11-04 DIAGNOSIS — I10 BENIGN ESSENTIAL HYPERTENSION: ICD-10-CM

## 2024-11-05 RX ORDER — LISINOPRIL 5 MG/1
5 TABLET ORAL DAILY
Qty: 30 TABLET | Refills: 4 | Status: SHIPPED | OUTPATIENT
Start: 2024-11-05

## 2024-11-20 ENCOUNTER — MYC MEDICAL ADVICE (OUTPATIENT)
Dept: CARDIOLOGY | Facility: CLINIC | Age: 54
End: 2024-11-20
Payer: COMMERCIAL

## 2024-11-20 DIAGNOSIS — I10 BENIGN ESSENTIAL HYPERTENSION: ICD-10-CM

## 2024-11-21 RX ORDER — METOPROLOL TARTRATE 25 MG/1
25 TABLET, FILM COATED ORAL 2 TIMES DAILY
Status: SHIPPED
Start: 2024-11-21

## 2024-11-21 NOTE — TELEPHONE ENCOUNTER
From: Sven Polanco MD  Sent: 11/21/2024   9:41 AM CST  To: Carline Antonio  Subject: FW: Meds are working great                       Carline,    Metoprolol could be the culprit. He can try cutting metoprolol in half and watch BP and see if that helps.    Sven

## 2024-12-02 ENCOUNTER — LAB (OUTPATIENT)
Dept: CARDIOLOGY | Facility: CLINIC | Age: 54
End: 2024-12-02
Payer: COMMERCIAL

## 2024-12-02 DIAGNOSIS — I10 BENIGN ESSENTIAL HYPERTENSION: ICD-10-CM

## 2024-12-02 LAB
ALBUMIN SERPL BCG-MCNC: 4.2 G/DL (ref 3.5–5.2)
ALP SERPL-CCNC: 102 U/L (ref 40–150)
ALT SERPL W P-5'-P-CCNC: 41 U/L (ref 0–70)
ANION GAP SERPL CALCULATED.3IONS-SCNC: 12 MMOL/L (ref 7–15)
AST SERPL W P-5'-P-CCNC: 29 U/L (ref 0–45)
BILIRUB SERPL-MCNC: 0.4 MG/DL
BUN SERPL-MCNC: 15.3 MG/DL (ref 6–20)
CALCIUM SERPL-MCNC: 9.6 MG/DL (ref 8.8–10.4)
CHLORIDE SERPL-SCNC: 101 MMOL/L (ref 98–107)
CREAT SERPL-MCNC: 1.36 MG/DL (ref 0.67–1.17)
EGFRCR SERPLBLD CKD-EPI 2021: 61 ML/MIN/1.73M2
GLUCOSE SERPL-MCNC: 114 MG/DL (ref 70–99)
HCO3 SERPL-SCNC: 24 MMOL/L (ref 22–29)
POTASSIUM SERPL-SCNC: 3.9 MMOL/L (ref 3.4–5.3)
PROT SERPL-MCNC: 6.7 G/DL (ref 6.4–8.3)
SODIUM SERPL-SCNC: 137 MMOL/L (ref 135–145)

## 2024-12-02 PROCEDURE — 36415 COLL VENOUS BLD VENIPUNCTURE: CPT

## 2024-12-02 PROCEDURE — 80053 COMPREHEN METABOLIC PANEL: CPT

## 2024-12-03 ENCOUNTER — MYC MEDICAL ADVICE (OUTPATIENT)
Dept: CARDIOLOGY | Facility: CLINIC | Age: 54
End: 2024-12-03

## 2024-12-03 ENCOUNTER — TRANSFERRED RECORDS (OUTPATIENT)
Dept: HEALTH INFORMATION MANAGEMENT | Facility: CLINIC | Age: 54
End: 2024-12-03

## 2024-12-03 ENCOUNTER — LAB REQUISITION (OUTPATIENT)
Dept: LAB | Facility: CLINIC | Age: 54
End: 2024-12-03

## 2024-12-03 ENCOUNTER — OFFICE VISIT (OUTPATIENT)
Dept: DERMATOLOGY | Facility: CLINIC | Age: 54
End: 2024-12-03
Payer: COMMERCIAL

## 2024-12-03 DIAGNOSIS — L64.9 ANDROGENETIC ALOPECIA: ICD-10-CM

## 2024-12-03 DIAGNOSIS — R94.4 DECREASED GFR: ICD-10-CM

## 2024-12-03 DIAGNOSIS — L66.10 LICHEN PLANOPILARIS: ICD-10-CM

## 2024-12-03 DIAGNOSIS — E11.9 TYPE 2 DIABETES MELLITUS WITHOUT COMPLICATIONS (H): ICD-10-CM

## 2024-12-03 DIAGNOSIS — L66.9 CICATRICIAL ALOPECIA: Primary | ICD-10-CM

## 2024-12-03 DIAGNOSIS — R79.89 ELEVATED SERUM CREATININE: Primary | ICD-10-CM

## 2024-12-03 DIAGNOSIS — L30.9 DERMATITIS: ICD-10-CM

## 2024-12-03 PROCEDURE — 99214 OFFICE O/P EST MOD 30 MIN: CPT | Performed by: DERMATOLOGY

## 2024-12-03 PROCEDURE — 80061 LIPID PANEL: CPT

## 2024-12-03 PROCEDURE — 80053 COMPREHEN METABOLIC PANEL: CPT

## 2024-12-03 RX ORDER — KETOCONAZOLE 20 MG/ML
SHAMPOO, SUSPENSION TOPICAL
Qty: 120 ML | Refills: 11 | Status: SHIPPED | OUTPATIENT
Start: 2024-12-03

## 2024-12-03 RX ORDER — METOPROLOL TARTRATE 50 MG
50 TABLET ORAL 2 TIMES DAILY
COMMUNITY
Start: 2024-11-25 | End: 2024-12-03

## 2024-12-03 RX ORDER — CLOBETASOL PROPIONATE 0.05 G/100ML
SHAMPOO TOPICAL
Qty: 118 ML | Refills: 5 | Status: SHIPPED | OUTPATIENT
Start: 2024-12-03

## 2024-12-03 RX ORDER — AMLODIPINE BESYLATE 10 MG/1
10 TABLET ORAL DAILY
COMMUNITY
Start: 2024-09-30 | End: 2024-12-03

## 2024-12-03 NOTE — LETTER
12/3/2024      Ricky D Tete  [address] [place] [address] [address] [place] [address]      Dear Colleague,    Thank you for referring your patient, Ricky Epstein, to the [facility]. Please see a copy of my visit note below.    [facility] Note  Encounter Date: Dec 3, 2024  Office Visit     Dermatology Problem List:  1.  . 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 Mast cell activation syndrome, previously followed with Dr Horta:  - Negative for c-kit mutation on genetic testing  - Current tx: silica extract   - Past tx: topical cromolyn, stopped taking early August, collagen peptides, multivitamins, antihistamines, and biotin supplements  3. L anterior lower extremity cyst, s/p excision 11/23/19  4. Elevated blood pressure reading in past  - 11.28/23 - continue to follow-up with primary care  ____________________________________________    Assessment & Plan:     # Lichen planopilaris  With a component of androgenetic alopecia. He is extremely happy with where things are today and noticing alvarado hair and minimal scalp symptoms. Objectivethe scalp appears less pink.  - Discussed risk/benefits of  low dose oral minoxidil (will wait to imitate after visit with PCP Sven Polanco)  - Continue dutasteride 0.5 mg daily.  - Continue ketoconazole shampoo alternating with H&S (advised pt to use at the back of the scalp)  - OK to continue vitamin D. Decrease to 1 pill daily. Get vitamin D checked next lab draw  in a couple of months to r/o hypervitaminosis  (was taking 57129A daily)  - Topical Gabapentin 6% solution 2 ml daily  - Continue his preference for a mixed combination of crushed finasteride with topical minoxidil     # Mast cell activation  Longstanding and well controlled with his home/OTC regimen. Saw Dr. Shrestha 2/2024, regimen was not changed.  - Continue silica extracts (patient reports response of his mast cell activation syndrome and scalp symptoms with these)      Procedures Performed:   Hair Metrix: 163  Frontal Scalp: 163; mild erythema  Mid scalp: 201; telangiectasia  Vertex scalp: 194; loose scale, mild erythema  Occipital scalp: 134; loose and perifollicular scale  R Temporal: 114; mild erythema  L Temporal: 125; large terminal fibers, mild erythema    HairMetrix Summary (12/3/24)    Frontal anterior (7.5 cm)    Mid scalp (12 cm)    Vertex (24 cm)    Occipital (30 cm)    Right temporal (8.5, 10.5 cm)    Left temporal (9, 10.5 cm)    Summary          Follow-up: 6 month(s) in-person, or earlier for new or changing lesions    Staff and Scribe:     Scribe Disclosure:   I, Sheila Lawton, am serving as a scribe; to document services personally performed by Alicia Gan MD -based on data collection and the provider's statements to me.     Fellow Disclosure:   I, Alicia Davis MS, am serving as a scribe to document services personally performed by Alicia Gan MD based on data collection and the provider's statements to me.      Provider Disclosure:  I agree with above History, Review of Systems, Physical exam and Plan.  I have reviewed the content of the documentation and have edited it as needed. I have personally performed the services documented here and the documentation accurately represents those services and the decisions I have made.      Electronically signed by:  Alicia Gan MD  Professor   Department of Dermatology  [facility]      ____________________________________________    CC: Hair Loss (Patient is here for a hair loss follow up, hair loss is stable, areas of concern none)    HPI:  Mr. Ricky Epstein is a 54 year old male who presents as a return patient for follow-up of hair loss, diagnosed as Lichen planopilaris .   - Last seen in-clinic on 3/29/24  - Shedding or thinning, or both: some thinning  - Current tx: Ketoconazole shampoo once a week, dutasteride 0.5 mg daily, Vitamin D 2500 international unit(s) twice a day, topical gabapentin solution not using, crushed finasteride with topical minoxidil not using as much    no Any new medications, supplements, or products? (please list below)     no Scalp pain   mild Scalp burning   mild Scalp itching    no Eyebrow changes    no Eyelash changes   no Beard changes    no Other body hair changes    no Nail changes    no Additional symptoms? (please list below)     - Overall course: Scalp health has been stable. He has been experiencing high blood pressure which his doctor has prescribed him metoprolol. Sven benitez is his provider that is following up with him.      Patient is otherwise feeling well, in usual state of health, and has no additional skin concerns today.     Labs:  11/4/22 Vitamin D reviewed.  12/2/24: cmp reviewed     Physical Exam:  GEN: Well developed, well-nourished, in no acute distress, in a pleasant mood.    SKIN: Focused examination of face and scalp was performed.  - Jurgen part width of 1.2 frontal, mid 1.1, vertex 1.2  - The layers of hair regrowth layers were noted to be  - 1-2 cm for the first  - 4 cm at the second  - cut layer at the third   - mild diffuse erythema   - no perifollicular erythema  - no perifollicular scale   - no scaling of the scalp   - negative hair pull test   - decreased eyelash density  - decreased eyebrow density  - no nail pitting or dystrophy   - no scalp folliculitis/pustules   - In comparison to prior photographs, 3/28/24    - No other  lesions of concern on areas examined.     Medications:  Current Outpatient Medications   Medication Sig Dispense Refill    amLODIPine (NORVASC) 10 MG tablet Take 10 mg by mouth daily.      amLODIPine (NORVASC) 5 MG tablet Take 1 tablet (5 mg) by mouth 2 times daily. 180 tablet 2    amLODIPine (NORVASC) 5 MG tablet TAKE ONE TABLET BY MOUTH ONE TIME DAILY 90 tablet 0    clobetasol propionate (CLOBEX) 0.05 % external shampoo Apply to dry scalp, let sit for 15-20 min, then lather and wash off. Alternate with head & shoulders. 118 mL 11    dutasteride (AVODART) 0.5 MG capsule TAKE ONE CAPSULE BY MOUTH ONE TIME DAILY 90 capsule 3    ketoconazole (NIZORAL) 2 % external shampoo Apply to scalp, lather, massage into scalp, leave on up to 2 minutes, then rinse 120 mL 5    lisinopril (ZESTRIL) 5 MG tablet TAKE ONE TABLET BY MOUTH ONE TIME DAILY 30 tablet 4    metoprolol tartrate (LOPRESSOR) 25 MG tablet Take 1 tablet (25 mg) by mouth 2 times daily.      metoprolol tartrate (LOPRESSOR) 50 MG tablet Take 50 mg by mouth 2 times daily.      oxyBUTYnin ER (DITROPAN XL) 10 MG 24 hr tablet Take 10 mg by mouth daily      Pyrithione Zinc 2 % SHAM Externally apply topically daily      Tadalafil (CIALIS PO) Take 5 mg by mouth as needed      UNABLE TO FIND 1 teaspoonful daily MEDICATION NAME: Diatomaceous Earth      vardenafil (LEVITRA) 20 MG tablet 1/2 to 1 tablet 60 minutes before sexual activity as needed Orally no more than once a day for 30 days      FARXIGA 5 MG TABS tablet Take 5 mg by mouth daily       No current facility-administered medications for this visit.      Past Medical History:   Patient Active Problem List   Diagnosis    Dermatitis    Onycholysis of toenail    Dysesthesia    Lichen plano-pilaris    Pruritus of scalp    Idiopathic mast cell activation syndrome (H)    Sinus tachycardia    Feeling of chest tightness    Benign essential hypertension     No past medical history on file.    CC No referring provider defined  for this encounter. on close of this encounter.     HairMetrix: 12/03/2024   - Frontal Anterior: 163; mild erythema   - Mid scalp: 201; telangiectasia   - Vertex scalp: 194; loose scale, mild erythema   - Occipital scalp: 134; loose and perifollicular scale   - R Temporal: 114; mild erythema   - L Temporal: 125; large terminal fibers, mild erythema     HairMetrix Summary (12/3/24)     Frontal anterior (7.5 cm)    Mid scalp (12 cm)    Vertex (24 cm)    Occipital (30 cm)    Right temporal (8.5, 10.5 cm)    Left temporal (9, 10.5 cm)    Summary            Again, thank you for allowing me to participate in the care of your patient.      Sincerely,    Alicia Gan MD

## 2024-12-03 NOTE — PROGRESS NOTES
[facility] Dermatology Note  Encounter Date: Dec 3, 2024  Office Visit     Dermatology Problem List:  1.  . 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 Mast cell activation syndrome, previously followed with Dr Horta:  - Negative for c-kit mutation on genetic testing  - Current tx: silica extract   - Past tx: topical cromolyn, stopped taking early August, collagen peptides, multivitamins, antihistamines, and biotin supplements  3. L anterior lower extremity cyst, s/p excision 11/23/19  4. Elevated blood pressure reading in past  - 11.28/23 - continue to follow-up with primary care  ____________________________________________    Assessment & Plan:     # Lichen planopilaris  With a component of androgenetic alopecia. He is extremely happy with where things are today and noticing alvarado hair and minimal scalp symptoms. Objectivethe scalp appears less pink.  - Discussed risk/benefits of  low dose oral minoxidil (will wait to imitate after visit with PCP Sven Polanco)  - Continue dutasteride 0.5 mg daily.  - Continue ketoconazole shampoo alternating with H&S (advised pt to use at the back of the scalp)  - OK to continue vitamin D. Decrease to 1 pill daily. Get vitamin D checked next lab draw in a couple of months to r/o hypervitaminosis  (was taking [id number] daily)  - Topical Gabapentin 6% solution 2 ml daily  - Continue his preference for a mixed combination of crushed finasteride with topical minoxidil     # Mast cell  activation  Longstanding and well controlled with his home/OTC regimen. Saw Dr. Shrestha 2/2024, regimen was not changed.  - Continue silica extracts (patient reports response of his mast cell activation syndrome and scalp symptoms with these)      Procedures Performed:   Hair Metrix: 163  Frontal Scalp: 163; mild erythema  Mid scalp: 201; telangiectasia  Vertex scalp: 194; loose scale, mild erythema  Occipital scalp: 134; loose and perifollicular scale  R Temporal: 114; mild erythema  L Temporal: 125; large terminal fibers, mild erythema    HairMetrix Summary (12/3/24)    Frontal anterior (7.5 cm)    Mid scalp (12 cm)    Vertex (24 cm)    Occipital (30 cm)    Right temporal (8.5, 10.5 cm)    Left temporal (9, 10.5 cm)    Summary          Follow-up: 6 month(s) in-person, or earlier for new or changing lesions    Staff and Scribe:     Scribe Disclosure:   I, Sheila Lawton, am serving as a scribe; to document services personally performed by Alicia Gan MD -based on data collection and the provider's statements to me.     Fellow Disclosure:   I, Alicia Davis, MS, am serving as a scribe to document services personally performed by Alicia Gan MD based on data collection and the provider's statements to me.      Provider Disclosure:  I agree with above History, Review of Systems, Physical exam and Plan.  I have reviewed the content of the documentation and have edited it as needed. I have personally performed the services documented here and the documentation accurately represents those services and the decisions I have made.      Electronically signed by:  Alicia Gan MD  Professor   Department of Dermatology  [facility]     ____________________________________________    CC: Hair Loss (Patient is here for a hair loss follow up, hair loss is stable, areas of concern none)    HPI:  Mr. Ricky Epstein is a 54 year old male who presents as a return patient for follow-up of hair  loss, diagnosed as Lichen planopilaris .   - Last seen in-clinic on 3/29/24  - Shedding or thinning, or both: some thinning  - Current tx: Ketoconazole shampoo once a week, dutasteride 0.5 mg daily, Vitamin D 2500 international unit(s) twice a day, topical gabapentin solution not using, crushed finasteride with topical minoxidil not using as much    no Any new medications, supplements, or products? (please list below)     no Scalp pain   mild Scalp burning   mild Scalp itching    no Eyebrow changes    no Eyelash changes   no Beard changes    no Other body hair changes    no Nail changes    no Additional symptoms? (please list below)     - Overall course: Scalp health has been stable. He has been experiencing high blood pressure which his doctor has prescribed him metoprolol. Sven benitez is his provider that is following up with him.      Patient is otherwise feeling well, in usual state of health, and has no additional skin concerns today.     Labs:  11/4/22 Vitamin D reviewed.  12/2/24: cmp reviewed     Physical Exam:  GEN: Well developed, well-nourished, in no acute distress, in a pleasant mood.    SKIN: Focused examination of face and scalp was performed.  - Jurgen part width of 1.2 frontal, mid 1.1, vertex 1.2  - The layers of hair regrowth layers were noted to be  - 1-2 cm for the first  - 4 cm at the second  - cut layer at the third   - mild diffuse erythema   - no perifollicular erythema  - no perifollicular scale   - no scaling of the scalp   - negative hair pull test   - decreased eyelash density  - decreased eyebrow density  - no nail pitting or dystrophy   - no scalp folliculitis/pustules   - In comparison to prior photographs, 3/28/24    - No other lesions of concern on areas examined.     Medications:  Current Outpatient Medications   Medication Sig Dispense Refill    amLODIPine (NORVASC) 10 MG tablet Take 10 mg by mouth daily.      amLODIPine (NORVASC) 5 MG tablet Take 1 tablet (5 mg) by mouth 2  times daily. 180 tablet 2    amLODIPine (NORVASC) 5 MG tablet TAKE ONE TABLET BY MOUTH ONE TIME DAILY 90 tablet 0    clobetasol propionate (CLOBEX) 0.05 % external shampoo Apply to dry scalp, let sit for 15-20 min, then lather and wash off. Alternate with head & shoulders. 118 mL 11    dutasteride (AVODART) 0.5 MG capsule TAKE ONE CAPSULE BY MOUTH ONE TIME DAILY 90 capsule 3    ketoconazole (NIZORAL) 2 % external shampoo Apply to scalp, lather, massage into scalp, leave on up to 2 minutes, then rinse 120 mL 5    lisinopril (ZESTRIL) 5 MG tablet TAKE ONE TABLET BY MOUTH ONE TIME DAILY 30 tablet 4    metoprolol tartrate (LOPRESSOR) 25 MG tablet Take 1 tablet (25 mg) by mouth 2 times daily.      metoprolol tartrate (LOPRESSOR) 50 MG tablet Take 50 mg by mouth 2 times daily.      oxyBUTYnin ER (DITROPAN XL) 10 MG 24 hr tablet Take 10 mg by mouth daily      Pyrithione Zinc 2 % SHAM Externally apply topically daily      Tadalafil (CIALIS PO) Take 5 mg by mouth as needed      UNABLE TO FIND 1 teaspoonful daily MEDICATION NAME: Diatomaceous Earth      vardenafil (LEVITRA) 20 MG tablet 1/2 to 1 tablet 60 minutes before sexual activity as needed Orally no more than once a day for 30 days      FARXIGA 5 MG TABS tablet Take 5 mg by mouth daily       No current facility-administered medications for this visit.      Past Medical History:   Patient Active Problem List   Diagnosis    Dermatitis    Onycholysis of toenail    Dysesthesia    Lichen plano-pilaris    Pruritus of scalp    Idiopathic mast cell activation syndrome (H)    Sinus tachycardia    Feeling of chest tightness    Benign essential hypertension     No past medical history on file.    CC No referring provider defined for this encounter. on close of this encounter.

## 2024-12-03 NOTE — NURSING NOTE
Ricky Epstein's goals for this visit include:   Chief Complaint   Patient presents with    Hair Loss     Patient is here for a hair loss follow up, hair loss is stable, areas of concern none       He requests these members of his care team be copied on today's visit information:     PCP: [facility]    Referring Provider:  Referred Self, MD  No address on file    There were no vitals taken for this visit.    Do you need any medication refills at today's visit?       Sammi Sanford EMT

## 2024-12-04 LAB
ALBUMIN SERPL BCG-MCNC: 4.3 G/DL (ref 3.5–5.2)
ALP SERPL-CCNC: 116 U/L (ref 40–150)
ALT SERPL W P-5'-P-CCNC: 49 U/L (ref 0–70)
ANION GAP SERPL CALCULATED.3IONS-SCNC: 11 MMOL/L (ref 7–15)
AST SERPL W P-5'-P-CCNC: 31 U/L (ref 0–45)
BILIRUB SERPL-MCNC: 0.2 MG/DL
BUN SERPL-MCNC: 20.6 MG/DL (ref 6–20)
CALCIUM SERPL-MCNC: 9.3 MG/DL (ref 8.8–10.4)
CHLORIDE SERPL-SCNC: 103 MMOL/L (ref 98–107)
CHOLEST SERPL-MCNC: 176 MG/DL
CREAT SERPL-MCNC: 1.29 MG/DL (ref 0.67–1.17)
EGFRCR SERPLBLD CKD-EPI 2021: 65 ML/MIN/1.73M2
FASTING STATUS PATIENT QL REPORTED: ABNORMAL
FASTING STATUS PATIENT QL REPORTED: ABNORMAL
GLUCOSE SERPL-MCNC: 117 MG/DL (ref 70–99)
HCO3 SERPL-SCNC: 23 MMOL/L (ref 22–29)
HDLC SERPL-MCNC: 52 MG/DL
LDLC SERPL CALC-MCNC: 91 MG/DL
NONHDLC SERPL-MCNC: 124 MG/DL
POTASSIUM SERPL-SCNC: 4.2 MMOL/L (ref 3.4–5.3)
PROT SERPL-MCNC: 6.7 G/DL (ref 6.4–8.3)
SODIUM SERPL-SCNC: 137 MMOL/L (ref 135–145)
TRIGL SERPL-MCNC: 165 MG/DL

## 2024-12-04 NOTE — PROGRESS NOTES
HairMetrix: 12/03/2024   - Frontal Anterior: 163; mild erythema   - Mid scalp: 201; telangiectasia   - Vertex scalp: 194; loose scale, mild erythema   - Occipital scalp: 134; loose and perifollicular scale   - R Temporal: 114; mild erythema   - L Temporal: 125; large terminal fibers, mild erythema     HairMetrix Summary (12/3/24)     Frontal anterior (7.5 cm)    Mid scalp (12 cm)    Vertex (24 cm)    Occipital (30 cm)    Right temporal (8.5, 10.5 cm)    Left temporal (9, 10.5 cm)    Summary          Alicia Davis MS  Dermatology Research Fellow

## 2024-12-05 NOTE — TELEPHONE ENCOUNTER
Contact[facility]  [phone] fax [phone]. They requested to try faxing the referral on Monday. Their fax system is currently not working.

## 2024-12-23 NOTE — PROGRESS NOTES
HEART CARE ENCOUNTER NOTE       [facility]  [phone]    Assessment/Recommendations   Assessment:  Hypertension - controlled, typically 120s/80s at home  CKD - evaluated by associated nephrology yesterday. He reports labs are pending and he will be following up with them again next week once labs resulted.  I have asked him to fill out an JUAN so we can request records from associated nephrology    Plan:  Decrease metoprolol tartrate to 12.5 mg twice daily  Continue lisinopril 5 mg daily and amlodipine 5 mg twice daily.    Continue to monitor blood pressure and heart rate daily and keep a log  May need to consider increasing lisinopril if he does not have good blood pressure control with the decrease in metoprolol tartrate - work-up is pending with nephrology  He should follow-up with Dr. Polanco or Christina BROWNLEE in the next couple of weeks in terms of his blood pressure and medication adjustment as well.     Thank you for the opportunity to participate in the care of Ricky Epstein. It's been a pleasure working with him.  Please do not hesitate to call with any questions or concerns.       History of Present Illness/Subjective    Ricky is a 54 year old male has a past medical history significant for hypertension, chronic kidney disease, type 2 diabetes mellitus who is seen today for follow-up.    [facility] Polanco regarding medication titration.  He has noticed decreased libido and it was thought that metoprolol is contributing to this.  His metoprolol was recently cut in half.  He initially noticed some improvement in his symptoms but now feels that he has not.  His heart rate is typically in the 60s at home when he is asked.  His blood pressure at home is typically 120s/80s on his current regimen.  He had follow-up with associated nephrology yesterday and has labs pending.  He will be touching base with them next week regarding his labs.  He denies  "chest discomfort, shortness of breath, palpitations, leg swelling, orthopnea, dizziness, lightheadedness, presyncope, syncope.  ____________________________________________________________  CT coronary angiogram: 9/7/2023    Right coronary artery dominant.  Normal coronary arteries.  No observed significant plaque or stenosis within the visualized coronary tree.    Borderline enlargement of the sinuses of Valsalva measuring 3.7 cm.    Please see separate report radiology for additional findings.       Physical Examination Review of Systems   Vitals: /88 (BP Location: Right arm, Patient Position: Sitting, Cuff Size: Adult Large)   Pulse 67   Resp 16   Ht 1.842 m (6' 0.5\")   Wt 106.8 kg (235 lb 6.4 oz)   SpO2 99%   BMI 31.49 kg/m    BMI= Body mass index is 31.49 kg/m .  Wt Readings from Last 3 Encounters:   12/24/24 106.8 kg (235 lb 6.4 oz)   07/12/24 112.2 kg (247 lb 6.4 oz)   11/28/23 106.1 kg (234 lb)       General Appearance:   Alert, cooperative and in no acute distress   ENT/Mouth: membranes moist, no oral lesions or bleeding gums.      EYES:  no scleral icterus, normal conjunctivae   Neck: Supple without lymphadenopathy.  Thyroid not visualized   Chest/Lungs:   lungs are clear to auscultation, no rales or wheezing   Cardiovascular:   Regular. Normal first and second heart sounds with no murmurs, rubs, or gallops; t no edema bilaterally    Abdomen:  Soft and nontender.    Extremities: no cyanosis or clubbing.   Skin: no xanthelasma, warm.    Neurologic: normal gait, normal  bilateral, no tremors   Psychiatric: Normal mood and affect       Please refer above for cardiac ROS details.      Medical History  Surgical History Family History Social History   No past medical history on file.  No past surgical history on file.  Family History   Problem Relation Age of Onset    Cancer No family hx of         no skin cancer    Skin Cancer No family hx of     Melanoma No family hx of     Social History "     Socioeconomic History    Marital status: Single     Spouse name: Not on file    Number of children: Not on file    Years of education: Not on file    Highest education level: Not on file   Occupational History    Not on file   Tobacco Use    Smoking status: Never     Passive exposure: Past    Smokeless tobacco: Never   Vaping Use    Vaping status: Never Used   Substance and Sexual Activity    Alcohol use: Not on file    Drug use: Never    Sexual activity: Not on file   Other Topics Concern    Parent/sibling w/ CABG, MI or angioplasty before 65F 55M? Not Asked   Social History Narrative    Not on file     Social Drivers of Health     Financial Resource Strain: Not on file   Food Insecurity: Not on file   Transportation Needs: Not on file   Physical Activity: Not on file   Stress: Not on file   Social Connections: Not on file   Interpersonal Safety: Not on file   Housing Stability: Not on file          Medications  Allergies   Current Outpatient Medications   Medication Sig Dispense Refill    amLODIPine (NORVASC) 5 MG tablet Take 1 tablet (5 mg) by mouth 2 times daily. 180 tablet 2    clobetasol propionate (CLOBEX) 0.05 % external shampoo Apply to dry scalp, let sit for 15-20 min, then lather and wash off 118 mL 5    dutasteride (AVODART) 0.5 MG capsule TAKE ONE CAPSULE BY MOUTH ONE TIME DAILY 90 capsule 3    FARXIGA 5 MG TABS tablet Take 5 mg by mouth daily      ketoconazole (NIZORAL) 2 % external shampoo Apply to scalp, lather, massage into scalp, leave on up to 2 minutes, then rinse 120 mL 11    lisinopril (ZESTRIL) 5 MG tablet TAKE ONE TABLET BY MOUTH ONE TIME DAILY 30 tablet 4    metoprolol tartrate (LOPRESSOR) 25 MG tablet Take 1 tablet (25 mg) by mouth 2 times daily.      oxyBUTYnin ER (DITROPAN XL) 10 MG 24 hr tablet Take 10 mg by mouth daily      Pyrithione Zinc 2 % SHAM Externally apply topically daily      Tadalafil (CIALIS PO) Take 5 mg by mouth as needed      UNABLE TO FIND 1 teaspoonful daily  "MEDICATION NAME: DiatWikiaeous Earth      vardenafil (LEVITRA) 20 MG tablet 1/2 to 1 tablet 60 minutes before sexual activity as needed Orally no more than once a day for 30 days      Allergies   Allergen Reactions    Alprazolam      Other reaction(s): activated is mast cell    Penicillins Rash     Other reaction(s): Edema         Lab Results    Chemistry/lipid CBC Cardiac Enzymes/BNP/TSH/INR   Recent Labs   Lab Test 12/03/24  1640   CHOL 176   HDL 52   LDL 91   TRIG 165*     Recent Labs   Lab Test 12/03/24  1640 06/09/23  1457 02/04/19  1139   LDL 91 88 99     Recent Labs   Lab Test 12/03/24  1640      POTASSIUM 4.2   CHLORIDE 103   CO2 23   *   BUN 20.6*   CR 1.29*   GFRESTIMATED 65   ALVERTO 9.3     Recent Labs   Lab Test 12/03/24  1640 12/02/24  1343 03/21/24  1517   CR 1.29* 1.36* 1.31*     No results for input(s): \"A1C\" in the last 38279 hours. Recent Labs   Lab Test 01/19/23 2139   WBC 7.8   HGB 15.3   HCT 44.8   MCV 89        Recent Labs   Lab Test 01/19/23  2139 09/16/19  1515 10/15/18  1418   HGB 15.3 15.5 15.6    Recent Labs   Lab Test 01/19/23 2139   TROPONINI 0.01     No results for input(s): \"BNP\", \"NTBNPI\", \"NTBNP\" in the last 90976 hours.  Recent Labs   Lab Test 01/19/23 2139   TSH 0.44     Recent Labs   Lab Test 01/19/23 2139   INR 1.01        40 minutes spent on the date of encounter doing chart review, review of outside records, review of test results, interpretation with above tests, patient visit, documentation, and discussion with other provider.      This note has been dictated using voice recognition software. Any grammatical or context distortions are unintentional and inherent to the software.    Aniyah Davila PA-C  Structural Heart Program  [facility]           "

## 2024-12-24 ENCOUNTER — OFFICE VISIT (OUTPATIENT)
Dept: CARDIOLOGY | Facility: CLINIC | Age: 54
End: 2024-12-24
Attending: INTERNAL MEDICINE
Payer: COMMERCIAL

## 2024-12-24 VITALS
OXYGEN SATURATION: 99 % | HEIGHT: 73 IN | HEART RATE: 67 BPM | SYSTOLIC BLOOD PRESSURE: 136 MMHG | RESPIRATION RATE: 16 BRPM | DIASTOLIC BLOOD PRESSURE: 88 MMHG | BODY MASS INDEX: 31.2 KG/M2 | WEIGHT: 235.4 LBS

## 2024-12-24 DIAGNOSIS — I10 BENIGN ESSENTIAL HYPERTENSION: Primary | ICD-10-CM

## 2024-12-24 PROCEDURE — 99215 OFFICE O/P EST HI 40 MIN: CPT | Performed by: PHYSICIAN ASSISTANT

## 2024-12-24 RX ORDER — METOPROLOL TARTRATE 25 MG/1
12.5 TABLET, FILM COATED ORAL 2 TIMES DAILY
Qty: 30 TABLET | Refills: 1 | Status: SHIPPED | OUTPATIENT
Start: 2024-12-24

## 2024-12-24 NOTE — LETTER
12/24/2024    [facility]  [address] [address]    RE: Ricky Epstein       Dear Colleague,     I had the pleasure of seeing Ricky Epstein in the [facility].  HEART CARE ENCOUNTER NOTE       [facility]  [phone]    Assessment/Recommendations   Assessment:  Hypertension - controlled, typically 120s/80s at home  CKD - evaluated by associated nephrology yesterday. He reports labs are pending and he will be following up with them again next week once labs resulted.  I have asked him to fill out an JUAN so we can request records from associated nephrology    Plan:  Decrease metoprolol tartrate to 12.5 mg twice daily  Continue lisinopril 5 mg daily and amlodipine 5 mg twice daily.    Continue to monitor blood pressure and heart rate daily and keep a log  May need to consider increasing lisinopril if he does not have good blood pressure control with the decrease in metoprolol tartrate - work-up is pending with nephrology  He should follow-up with Dr. Polanco or Christina BROWNLEE in the next couple of weeks in terms of his blood pressure and medication adjustment as well.     Thank you for the opportunity to participate in the care of Ricky Epstein. It's been a pleasure working with him.  Please do not hesitate to call with any questions or concerns.       History of Present Illness/Subjective    Ricky is a 54 year old male has a past medical history significant for hypertension, chronic kidney disease, type 2 diabetes mellitus who is seen today for follow-up.    [facility] Polanco regarding medication titration.  He has noticed decreased libido and it was thought that metoprolol is contributing to this.  His metoprolol was recently cut in half.  He initially noticed some improvement in his symptoms but now feels that he has not.  His heart rate is typically in the 60s at home when he is asked.  His  "blood pressure at home is typically 120s/80s on his current regimen.  He had follow-up with associated nephrology yesterday and has labs pending.  He will be touching base with them next week regarding his labs.  He denies chest discomfort, shortness of breath, palpitations, leg swelling, orthopnea, dizziness, lightheadedness, presyncope, syncope.  ____________________________________________________________  CT coronary angiogram: 9/7/2023     Right coronary artery dominant.  Normal coronary arteries.  No observed significant plaque or stenosis within the visualized coronary tree.     Borderline enlargement of the sinuses of Valsalva measuring 3.7 cm.     Please see separate report radiology for additional findings.       Physical Examination Review of Systems   Vitals: /88 (BP Location: Right arm, Patient Position: Sitting, Cuff Size: Adult Large)   Pulse 67   Resp 16   Ht 1.842 m (6' 0.5\")   Wt 106.8 kg (235 lb 6.4 oz)   SpO2 99%   BMI 31.49 kg/m    BMI= Body mass index is 31.49 kg/m .  Wt Readings from Last 3 Encounters:   12/24/24 106.8 kg (235 lb 6.4 oz)   07/12/24 112.2 kg (247 lb 6.4 oz)   11/28/23 106.1 kg (234 lb)       General Appearance:   Alert, cooperative and in no acute distress   ENT/Mouth: membranes moist, no oral lesions or bleeding gums.      EYES:  no scleral icterus, normal conjunctivae   Neck: Supple without lymphadenopathy.  Thyroid not visualized   Chest/Lungs:   lungs are clear to auscultation, no rales or wheezing   Cardiovascular:   Regular. Normal first and second heart sounds with no murmurs, rubs, or gallops; t no edema bilaterally    Abdomen:  Soft and nontender.    Extremities: no cyanosis or clubbing.   Skin: no xanthelasma, warm.    Neurologic: normal gait, normal  bilateral, no tremors   Psychiatric: Normal mood and affect       Please refer above for cardiac ROS details.      Medical History  Surgical History Family History Social History   No past medical history " on file.  No past surgical history on file.  Family History   Problem Relation Age of Onset     Cancer No family hx of         no skin cancer     Skin Cancer No family hx of      Melanoma No family hx of     Social History     Socioeconomic History     Marital status: Single     Spouse name: Not on file     Number of children: Not on file     Years of education: Not on file     Highest education level: Not on file   Occupational History     Not on file   Tobacco Use     Smoking status: Never     Passive exposure: Past     Smokeless tobacco: Never   Vaping Use     Vaping status: Never Used   Substance and Sexual Activity     Alcohol use: Not on file     Drug use: Never     Sexual activity: Not on file   Other Topics Concern     Parent/sibling w/ CABG, MI or angioplasty before 65F 55M? Not Asked   Social History Narrative     Not on file     Social Drivers of Health     Financial Resource Strain: Not on file   Food Insecurity: Not on file   Transportation Needs: Not on file   Physical Activity: Not on file   Stress: Not on file   Social Connections: Not on file   Interpersonal Safety: Not on file   Housing Stability: Not on file          Medications  Allergies   Current Outpatient Medications   Medication Sig Dispense Refill     amLODIPine (NORVASC) 5 MG tablet Take 1 tablet (5 mg) by mouth 2 times daily. 180 tablet 2     clobetasol propionate (CLOBEX) 0.05 % external shampoo Apply to dry scalp, let sit for 15-20 min, then lather and wash off 118 mL 5     dutasteride (AVODART) 0.5 MG capsule TAKE ONE CAPSULE BY MOUTH ONE TIME DAILY 90 capsule 3     FARXIGA 5 MG TABS tablet Take 5 mg by mouth daily       ketoconazole (NIZORAL) 2 % external shampoo Apply to scalp, lather, massage into scalp, leave on up to 2 minutes, then rinse 120 mL 11     lisinopril (ZESTRIL) 5 MG tablet TAKE ONE TABLET BY MOUTH ONE TIME DAILY 30 tablet 4     metoprolol tartrate (LOPRESSOR) 25 MG tablet Take 1 tablet (25 mg) by mouth 2 times daily.    "    oxyBUTYnin ER (DITROPAN XL) 10 MG 24 hr tablet Take 10 mg by mouth daily       Pyrithione Zinc 2 % SHAM Externally apply topically daily       Tadalafil (CIALIS PO) Take 5 mg by mouth as needed       UNABLE TO FIND 1 teaspoonful daily MEDICATION NAME: Diatomaceous Earth       vardenafil (LEVITRA) 20 MG tablet 1/2 to 1 tablet 60 minutes before sexual activity as needed Orally no more than once a day for 30 days      Allergies   Allergen Reactions     Alprazolam      Other reaction(s): activated is mast cell     Penicillins Rash     Other reaction(s): Edema         Lab Results    Chemistry/lipid CBC Cardiac Enzymes/BNP/TSH/INR   Recent Labs   Lab Test 12/03/24  1640   CHOL 176   HDL 52   LDL 91   TRIG 165*     Recent Labs   Lab Test 12/03/24  1640 06/09/23  1457 02/04/19  1139   LDL 91 88 99     Recent Labs   Lab Test 12/03/24  1640      POTASSIUM 4.2   CHLORIDE 103   CO2 23   *   BUN 20.6*   CR 1.29*   GFRESTIMATED 65   ALVERTO 9.3     Recent Labs   Lab Test 12/03/24  1640 12/02/24  1343 03/21/24  1517   CR 1.29* 1.36* 1.31*     No results for input(s): \"A1C\" in the last 91629 hours. Recent Labs   Lab Test 01/19/23 2139   WBC 7.8   HGB 15.3   HCT 44.8   MCV 89        Recent Labs   Lab Test 01/19/23  2139 09/16/19  1515 10/15/18  1418   HGB 15.3 15.5 15.6    Recent Labs   Lab Test 01/19/23 2139   TROPONINI 0.01     No results for input(s): \"BNP\", \"NTBNPI\", \"NTBNP\" in the last 70728 hours.  Recent Labs   Lab Test 01/19/23 2139   TSH 0.44     Recent Labs   Lab Test 01/19/23 2139   INR 1.01        40 minutes spent on the date of encounter doing chart review, review of outside records, review of test results, interpretation with above tests, patient visit, documentation, and discussion with other provider.      This note has been dictated using voice recognition software. Any grammatical or context distortions are unintentional and inherent to the software.    Aniyah Davila PA-C  Structural Heart " Program  [facility]               Thank you for allowing me to participate in the care of your patient.      Sincerely,     KEM Walker [facility]  cc:   Sven Polanco MD  [address]  [place] [address]

## 2024-12-24 NOTE — PATIENT INSTRUCTIONS
Ricky Epstein,    It was a pleasure to see you today in the clinic regarding your follow-up.     My recommendations after this visit include:     - trial decrease of metoprolol to 12.5 mg twice daily (will send in new prescription)   - continue to monitor your blood pressure and heart rate daily and keep a log   - follow-up with Dr. Polanco or Christina BROWNLEE in the next couple of weeks for follow-up in terms of your BP and medication adjustment   - we will request records from associated nephrology - may need to consider increasing lisinopril if BP is persistently elevated with decreasing metoprolol        If you have questions or concerns, please call using the numbers below:    After Hours/Scheduling  [phone]        Aniyah Davila PA-C  Structural Heart Program  [facility]

## 2025-01-03 ENCOUNTER — MYC MEDICAL ADVICE (OUTPATIENT)
Dept: CARDIOLOGY | Facility: CLINIC | Age: 55
End: 2025-01-03
Payer: COMMERCIAL

## 2025-01-07 NOTE — TELEPHONE ENCOUNTER
From: Sven Polanco MD  Sent: 1/8/2025   7:17 AM CST  To: Carline Antonio  Subject: FW: Can I take Lisinopril 5MG twice a day?       Carline,    Yes that would be great to have blood pressure readings.    Sven Crowder  ----- Message -----  From: Carline Alvarez  Sent: 1/7/2025   1:05 PM CST  To: Sven Polanco MD  Subject: FW: Can I take Lisinopril 5MG twice a day?       [address] Myers Dr. for the confusion, you had asked for the lab work from nephrology prior to increasing his lisinopril. This is available in the chart.  Still would want his blood pressure readings though I presume.     Carline Crowder  ----- Message -----  From: Sven Polanco MD  Sent: 1/7/2025  12:27 PM CST  To: Carline Antonio  Subject: FW: Can I take Lisinopril 5MG twice a day?       Thank you.  I do not see any action items for me at this time.    ThanksSven  ----- Message -----  From: Carline Alvarez  Sent: 1/7/2025   8:29 AM CST  To: Sven Polanco MD  Subject: Can I take Lisinopril 5MG twice a day?           ----- Message from Carline Alvarez sent at 1/7/2025  8:29 AM CST -----  Haile Polanco,    Lab work is available from 12/03bart on 12/24 with plan to:  1. Decrease metoprolol tartrate to 12.5 mg twice daily  2. Continue lisinopril 5 mg daily and amlodipine 5 mg twice daily.    3. Continue to monitor blood pressure and heart rate daily and keep a log  4. May need to consider increasing lisinopril if he does not have good blood pressure control with the decrease in metoprolol tartrate - work-up is pending with nephrology  5. He should follow-up with Dr. Polanco or Christina BROWNLEE in the next couple of weeks in terms of his blood pressure and medication adjustment as well.      From: Sven Polanco MD  Sent: 1/6/2025   7:09 AM CST  To: Carline Alvarez  Subject: FW: Can I take Lisinopril 5MG twice a day?       Carline,    Our blood pressures high?  Also wondering if we can get results of labs from nephrology.  Would  need that if we are going to increase the lisinopril.    Thanks,    Sven

## 2025-01-11 ENCOUNTER — HEALTH MAINTENANCE LETTER (OUTPATIENT)
Age: 55
End: 2025-01-11

## 2025-01-14 NOTE — CONFIDENTIAL NOTE
DIAGNOSIS:    Elevated serum creatinine   Decreased GFR      DATE RECEIVED: 02.26.2025     NOTES STATUS DETAILS   OFFICE NOTE from referring provider Internal 12.03.2024  Sven guardado MD    OFFICE NOTE from other specialist  Received 12.19.2024  James Harmon   MEDICATION LIST Internal / Received    LABS     CBC Internal 01.19.2023   CMP Internal 01.19.2023   BMP Internal 11.10.2023   UA Internal 01.19.2023   URINE PROTEIN Internal 01.19.2023

## 2025-02-19 DIAGNOSIS — I10 BENIGN ESSENTIAL HYPERTENSION: ICD-10-CM

## 2025-02-19 RX ORDER — METOPROLOL TARTRATE 25 MG/1
12.5 TABLET, FILM COATED ORAL 2 TIMES DAILY
Qty: 30 TABLET | Refills: 4 | Status: SHIPPED | OUTPATIENT
Start: 2025-02-19

## 2025-02-26 ENCOUNTER — PRE VISIT (OUTPATIENT)
Dept: NEPHROLOGY | Facility: CLINIC | Age: 55
End: 2025-02-26
Payer: COMMERCIAL

## 2025-03-15 DIAGNOSIS — I10 BENIGN ESSENTIAL HYPERTENSION: ICD-10-CM

## 2025-03-18 RX ORDER — LISINOPRIL 5 MG/1
5 TABLET ORAL DAILY
Qty: 30 TABLET | Refills: 0 | Status: SHIPPED | OUTPATIENT
Start: 2025-03-18

## 2025-04-15 DIAGNOSIS — I10 BENIGN ESSENTIAL HYPERTENSION: ICD-10-CM

## 2025-04-16 RX ORDER — LISINOPRIL 5 MG/1
5 TABLET ORAL DAILY
Qty: 30 TABLET | Refills: 0 | OUTPATIENT
Start: 2025-04-16

## 2025-04-19 DIAGNOSIS — I10 BENIGN ESSENTIAL HYPERTENSION: ICD-10-CM

## 2025-04-21 ENCOUNTER — LAB REQUISITION (OUTPATIENT)
Dept: LAB | Facility: CLINIC | Age: 55
End: 2025-04-21

## 2025-04-21 DIAGNOSIS — E11.9 TYPE 2 DIABETES MELLITUS WITHOUT COMPLICATIONS (H): ICD-10-CM

## 2025-04-21 PROCEDURE — 80048 BASIC METABOLIC PNL TOTAL CA: CPT | Performed by: FAMILY MEDICINE

## 2025-04-21 PROCEDURE — 82310 ASSAY OF CALCIUM: CPT | Performed by: FAMILY MEDICINE

## 2025-04-21 RX ORDER — LISINOPRIL 5 MG/1
TABLET ORAL
Qty: 30 TABLET | Refills: 0 | OUTPATIENT
Start: 2025-04-21

## 2025-04-23 LAB
ANION GAP SERPL CALCULATED.3IONS-SCNC: 11 MMOL/L (ref 7–15)
BUN SERPL-MCNC: 23.3 MG/DL (ref 6–20)
CALCIUM SERPL-MCNC: 9.2 MG/DL (ref 8.8–10.4)
CHLORIDE SERPL-SCNC: 103 MMOL/L (ref 98–107)
CREAT SERPL-MCNC: 1.29 MG/DL (ref 0.67–1.17)
EGFRCR SERPLBLD CKD-EPI 2021: 65 ML/MIN/1.73M2
GLUCOSE SERPL-MCNC: 95 MG/DL (ref 70–99)
HCO3 SERPL-SCNC: 23 MMOL/L (ref 22–29)
POTASSIUM SERPL-SCNC: 3.9 MMOL/L (ref 3.4–5.3)
SODIUM SERPL-SCNC: 137 MMOL/L (ref 135–145)

## 2025-04-26 ENCOUNTER — HEALTH MAINTENANCE LETTER (OUTPATIENT)
Age: 55
End: 2025-04-26

## 2025-05-30 ENCOUNTER — LAB REQUISITION (OUTPATIENT)
Dept: LAB | Facility: CLINIC | Age: 55
End: 2025-05-30

## 2025-05-30 ENCOUNTER — TRANSFERRED RECORDS (OUTPATIENT)
Dept: HEALTH INFORMATION MANAGEMENT | Facility: CLINIC | Age: 55
End: 2025-05-30

## 2025-05-30 DIAGNOSIS — M79.89 OTHER SPECIFIED SOFT TISSUE DISORDERS: ICD-10-CM

## 2025-05-30 PROCEDURE — 83880 ASSAY OF NATRIURETIC PEPTIDE: CPT | Performed by: STUDENT IN AN ORGANIZED HEALTH CARE EDUCATION/TRAINING PROGRAM

## 2025-05-30 PROCEDURE — 80048 BASIC METABOLIC PNL TOTAL CA: CPT | Performed by: STUDENT IN AN ORGANIZED HEALTH CARE EDUCATION/TRAINING PROGRAM

## 2025-05-31 LAB
ANION GAP SERPL CALCULATED.3IONS-SCNC: 12 MMOL/L (ref 7–15)
BUN SERPL-MCNC: 24.1 MG/DL (ref 6–20)
CALCIUM SERPL-MCNC: 9.2 MG/DL (ref 8.8–10.4)
CHLORIDE SERPL-SCNC: 104 MMOL/L (ref 98–107)
CREAT SERPL-MCNC: 1.23 MG/DL (ref 0.67–1.17)
EGFRCR SERPLBLD CKD-EPI 2021: 68 ML/MIN/1.73M2
GLUCOSE SERPL-MCNC: 100 MG/DL (ref 70–99)
HCO3 SERPL-SCNC: 24 MMOL/L (ref 22–29)
NT-PROBNP SERPL-MCNC: <36 PG/ML (ref 0–177)
POTASSIUM SERPL-SCNC: 4.1 MMOL/L (ref 3.4–5.3)
SODIUM SERPL-SCNC: 140 MMOL/L (ref 135–145)

## 2025-06-03 DIAGNOSIS — I10 BENIGN ESSENTIAL HYPERTENSION: ICD-10-CM

## 2025-06-03 DIAGNOSIS — L66.10 LICHEN PLANOPILARIS: ICD-10-CM

## 2025-06-04 RX ORDER — AMLODIPINE BESYLATE 10 MG/1
5 TABLET ORAL 2 TIMES DAILY
Qty: 90 TABLET | Refills: 0 | Status: SHIPPED | OUTPATIENT
Start: 2025-06-04

## 2025-06-04 RX ORDER — LISINOPRIL 5 MG/1
5 TABLET ORAL DAILY
Qty: 90 TABLET | Refills: 0 | Status: SHIPPED | OUTPATIENT
Start: 2025-06-04

## 2025-06-05 DIAGNOSIS — I10 BENIGN ESSENTIAL HYPERTENSION: ICD-10-CM

## 2025-06-05 RX ORDER — DUTASTERIDE 0.5 MG/1
1 CAPSULE, LIQUID FILLED ORAL DAILY
Qty: 90 CAPSULE | Refills: 0 | Status: SHIPPED | OUTPATIENT
Start: 2025-06-05

## 2025-06-05 RX ORDER — METOPROLOL TARTRATE 25 MG/1
12.5 TABLET, FILM COATED ORAL 2 TIMES DAILY
Qty: 90 TABLET | Refills: 0 | Status: SHIPPED | OUTPATIENT
Start: 2025-06-05

## 2025-06-05 NOTE — TELEPHONE ENCOUNTER
Daphne refill provided to get pt to his July appointment.    Per Malik's last note:    Continue dutasteride 0.5 mg daily.     Neetu Keys RN on 6/5/2025 at 2:21 PM

## 2025-06-05 NOTE — TELEPHONE ENCOUNTER
Last Written Prescription:  dutasteride (AVODART) 0.5 MG capsule 90 capsule 3 3/29/2024     ----------------------  Last Visit Date: 12/3/24  Future Visit Date: 7/22/25  ----------------------        Refill decision: Medication unable to be refilled by RN due to: Medication not included in refill protocol policy         Request from pharmacy:  Requested Prescriptions   Pending Prescriptions Disp Refills    dutasteride (AVODART) 0.5 MG capsule [Pharmacy Med Name: Dutasteride Oral Capsule 0.5 MG] 90 capsule 0     Sig: TAKE ONE CAPSULE BY MOUTH ONE TIME DAILY       BPH Agents Failed - 6/5/2025  1:45 PM        Failed - Medication indicated for associated diagnosis     Medication is associated with one or more of the following diagnoses:     Benign prostatic hyperplasia   Male pattern alopecia   Nocturia          Passed - Medication is active on med list and the sig matches. RN to manually verify dose and sig if red X/fail.     If the protocol passes (green check), you do not need to verify med dose and sig.    A prescription matches if they are the same clinical intention.    For Example: once daily and every morning are the same.    The protocol can not identify upper and lower case letters as matching and will fail.     For Example: Take 1 tablet (50 mg) by mouth daily     TAKE 1 TABLET (50 MG) BY MOUTH DAILY    For all fails (red x), verify dose and sig.    If the refill does match what is on file, the RN can still proceed to approve the refill request.       If they do not match, route to the appropriate provider.             Passed - Recent (12 month) or future (90 days) visit with authorizing provider's specialty (provided they have been seen in the past 15 months)     The patient must have completed an in-person or virtual visit within the past 12 months or has a future visit scheduled within the next 90 days with the authorizing provider s specialty.  Urgent care and e-visits do not qualify as an office visit  for this protocol.          Passed - Patient is 18 years of age or older

## 2025-06-28 DIAGNOSIS — L66.10 LICHEN PLANOPILARIS: ICD-10-CM

## 2025-06-28 DIAGNOSIS — L64.9 ANDROGENETIC ALOPECIA: ICD-10-CM

## 2025-07-02 RX ORDER — CLOBETASOL PROPIONATE 0.05 G/100ML
SHAMPOO TOPICAL
Qty: 118 ML | Refills: 0 | Status: SHIPPED | OUTPATIENT
Start: 2025-07-02

## 2025-07-02 NOTE — TELEPHONE ENCOUNTER
Last Written Prescription:  clobetasol propionate (CLOBEX) 0.05 % external shampoo             Summary: Apply to dry scalp, let sit for 15-20 min, then lather and wash off Disp-118 mL, R-5 E-Prescribe  Start: 12/03/2024Ordered On: 12/03/2024Pharmacy: [id number] [place], [place] [address]: Taking: Long-term: Med Note:                Directions: Apply to dry scalp, let sit for 15-20 min, then lather and wash off  Ordering Department:  DER[facility]  Authorized By: Alicia Gan MD  Dispense: 118 mL  Refills: 5 ordered       ----------------------  Last Visit Date: 12/03/2024 Office Visit Derm - SONYA Gan  Future Visit Date: 7/22/25  ----------------------      Refill decision:   [x] Medication refilled per  Medication Refill in Ambulatory Care  policy.  Request from pharmacy:  Requested Prescriptions   Pending Prescriptions Disp Refills    clobetasol propionate (CLOBEX) 0.05 % external shampoo [Pharmacy Med Name: Clobetasol Propionate External Shampoo 0.05 %] 118 mL 0     Sig: Apply to dry scalp, let sit for 15-20 min, then lather and wash off       There is no refill protocol information for this order        Pam LARSEN RN  P Central Nursing/Red Flag Triage & Med Refill Team

## 2025-07-13 ENCOUNTER — HEALTH MAINTENANCE LETTER (OUTPATIENT)
Age: 55
End: 2025-07-13

## 2025-07-18 ENCOUNTER — LAB REQUISITION (OUTPATIENT)
Dept: LAB | Facility: CLINIC | Age: 55
End: 2025-07-18

## 2025-07-18 DIAGNOSIS — Z12.5 ENCOUNTER FOR SCREENING FOR MALIGNANT NEOPLASM OF PROSTATE: ICD-10-CM

## 2025-07-18 PROCEDURE — G0103 PSA SCREENING: HCPCS | Performed by: FAMILY MEDICINE

## 2025-07-19 LAB — PSA SERPL DL<=0.01 NG/ML-MCNC: 0.33 NG/ML (ref 0–3.5)

## 2025-07-22 ENCOUNTER — OFFICE VISIT (OUTPATIENT)
Dept: DERMATOLOGY | Facility: CLINIC | Age: 55
End: 2025-07-22
Payer: COMMERCIAL

## 2025-07-22 VITALS
RESPIRATION RATE: 16 BRPM | TEMPERATURE: 97.9 F | HEART RATE: 78 BPM | OXYGEN SATURATION: 94 % | SYSTOLIC BLOOD PRESSURE: 149 MMHG | DIASTOLIC BLOOD PRESSURE: 98 MMHG

## 2025-07-22 DIAGNOSIS — L66.10 LICHEN PLANOPILARIS: Primary | ICD-10-CM

## 2025-07-22 DIAGNOSIS — L66.9 CICATRICIAL ALOPECIA: ICD-10-CM

## 2025-07-22 DIAGNOSIS — R03.0 ELEVATED BLOOD PRESSURE READING: ICD-10-CM

## 2025-07-22 DIAGNOSIS — L64.9 ANDROGENETIC ALOPECIA: ICD-10-CM

## 2025-07-22 DIAGNOSIS — L30.9 DERMATITIS: ICD-10-CM

## 2025-07-22 RX ORDER — CLOBETASOL PROPIONATE 0.05 G/100ML
SHAMPOO TOPICAL
Qty: 118 ML | Refills: 3 | Status: SHIPPED | OUTPATIENT
Start: 2025-07-22

## 2025-07-22 RX ORDER — DUTASTERIDE 0.5 MG/1
1 CAPSULE, LIQUID FILLED ORAL DAILY
Qty: 90 CAPSULE | Refills: 1 | Status: SHIPPED | OUTPATIENT
Start: 2025-07-22

## 2025-07-22 ASSESSMENT — PAIN SCALES - GENERAL: PAINLEVEL_OUTOF10: NO PAIN (0)

## 2025-07-23 ENCOUNTER — PATIENT OUTREACH (OUTPATIENT)
Dept: CARE COORDINATION | Facility: CLINIC | Age: 55
End: 2025-07-23
Payer: COMMERCIAL

## 2025-07-25 ENCOUNTER — MYC MEDICAL ADVICE (OUTPATIENT)
Dept: CARDIOLOGY | Facility: CLINIC | Age: 55
End: 2025-07-25
Payer: COMMERCIAL

## 2025-07-25 DIAGNOSIS — I10 BENIGN ESSENTIAL HYPERTENSION: ICD-10-CM

## 2025-07-25 DIAGNOSIS — N52.2 DRUG-INDUCED ERECTILE DYSFUNCTION: ICD-10-CM

## 2025-07-25 DIAGNOSIS — Z82.79 FAMILY HISTORY OF PATENT FORAMEN OVALE: ICD-10-CM

## 2025-07-28 RX ORDER — CARVEDILOL 6.25 MG/1
6.25 TABLET ORAL 2 TIMES DAILY WITH MEALS
Qty: 180 TABLET | Refills: 1 | Status: SHIPPED | OUTPATIENT
Start: 2025-07-28

## 2025-07-28 NOTE — TELEPHONE ENCOUNTER
Replied to mychart. Rx sent.-njm  ___________  ----- Message -----  From: Sven Polanco MD  Sent: 7/28/2025  12:56 PM CDT  To: Marina Mckeon RN  Subject: FW: Can we increase the dose of Carvedilol?      Marina, would increase to 6.25 mg twice daily.    Sven Crowder  ----- Message -----  From: Marina Mckeon RN  Sent: 7/28/2025  12:09 PM CDT  To: Sven Polanco MD  Subject: FW: Can we increase the dose of Carvedilol?

## 2025-08-03 ENCOUNTER — HEALTH MAINTENANCE LETTER (OUTPATIENT)
Age: 55
End: 2025-08-03

## 2025-08-06 ENCOUNTER — MYC MEDICAL ADVICE (OUTPATIENT)
Dept: CARDIOLOGY | Facility: CLINIC | Age: 55
End: 2025-08-06
Payer: COMMERCIAL

## 2025-08-06 DIAGNOSIS — I10 BENIGN ESSENTIAL HYPERTENSION: Primary | ICD-10-CM

## 2025-08-06 DIAGNOSIS — R00.0 SINUS TACHYCARDIA: ICD-10-CM

## 2025-08-07 RX ORDER — METOPROLOL TARTRATE 25 MG/1
12.5 TABLET, FILM COATED ORAL 2 TIMES DAILY
Status: SHIPPED
Start: 2025-08-07